# Patient Record
Sex: MALE | Race: WHITE | NOT HISPANIC OR LATINO | Employment: OTHER | URBAN - METROPOLITAN AREA
[De-identification: names, ages, dates, MRNs, and addresses within clinical notes are randomized per-mention and may not be internally consistent; named-entity substitution may affect disease eponyms.]

---

## 2017-01-12 ENCOUNTER — ALLSCRIPTS OFFICE VISIT (OUTPATIENT)
Dept: OTHER | Facility: OTHER | Age: 72
End: 2017-01-12

## 2017-04-19 ENCOUNTER — APPOINTMENT (OUTPATIENT)
Dept: LAB | Facility: CLINIC | Age: 72
End: 2017-04-19
Payer: MEDICARE

## 2017-04-19 ENCOUNTER — TRANSCRIBE ORDERS (OUTPATIENT)
Dept: LAB | Facility: CLINIC | Age: 72
End: 2017-04-19

## 2017-04-19 DIAGNOSIS — Z12.5 SPECIAL SCREENING FOR MALIGNANT NEOPLASM OF PROSTATE: ICD-10-CM

## 2017-04-19 DIAGNOSIS — I10 ESSENTIAL HYPERTENSION, MALIGNANT: ICD-10-CM

## 2017-04-19 DIAGNOSIS — I25.119 ATHEROSCLEROSIS OF NATIVE CORONARY ARTERY WITH ANGINA PECTORIS, UNSPECIFIED WHETHER NATIVE OR TRANSPLANTED HEART (HCC): Primary | ICD-10-CM

## 2017-04-19 DIAGNOSIS — C61 MALIGNANT NEOPLASM OF PROSTATE (HCC): ICD-10-CM

## 2017-04-19 LAB
ALBUMIN SERPL BCP-MCNC: 3.6 G/DL (ref 3.5–5)
ALP SERPL-CCNC: 43 U/L (ref 46–116)
ALT SERPL W P-5'-P-CCNC: 22 U/L (ref 12–78)
ANION GAP SERPL CALCULATED.3IONS-SCNC: 6 MMOL/L (ref 4–13)
AST SERPL W P-5'-P-CCNC: 17 U/L (ref 5–45)
BASOPHILS # BLD AUTO: 0.03 THOUSANDS/ΜL (ref 0–0.1)
BASOPHILS NFR BLD AUTO: 1 % (ref 0–1)
BILIRUB SERPL-MCNC: 0.56 MG/DL (ref 0.2–1)
BUN SERPL-MCNC: 15 MG/DL (ref 5–25)
CALCIUM SERPL-MCNC: 8.7 MG/DL (ref 8.3–10.1)
CHLORIDE SERPL-SCNC: 102 MMOL/L (ref 100–108)
CHOLEST SERPL-MCNC: 141 MG/DL (ref 50–200)
CO2 SERPL-SCNC: 30 MMOL/L (ref 21–32)
CREAT SERPL-MCNC: 0.77 MG/DL (ref 0.6–1.3)
EOSINOPHIL # BLD AUTO: 0.13 THOUSAND/ΜL (ref 0–0.61)
EOSINOPHIL NFR BLD AUTO: 3 % (ref 0–6)
ERYTHROCYTE [DISTWIDTH] IN BLOOD BY AUTOMATED COUNT: 13.3 % (ref 11.6–15.1)
GFR SERPL CREATININE-BSD FRML MDRD: >60 ML/MIN/1.73SQ M
GLUCOSE P FAST SERPL-MCNC: 89 MG/DL (ref 65–99)
HCT VFR BLD AUTO: 42.8 % (ref 36.5–49.3)
HDLC SERPL-MCNC: 58 MG/DL (ref 40–60)
HGB BLD-MCNC: 14.4 G/DL (ref 12–17)
LDLC SERPL CALC-MCNC: 69 MG/DL (ref 0–100)
LYMPHOCYTES # BLD AUTO: 1.8 THOUSANDS/ΜL (ref 0.6–4.47)
LYMPHOCYTES NFR BLD AUTO: 38 % (ref 14–44)
MCH RBC QN AUTO: 30.1 PG (ref 26.8–34.3)
MCHC RBC AUTO-ENTMCNC: 33.6 G/DL (ref 31.4–37.4)
MCV RBC AUTO: 89 FL (ref 82–98)
MONOCYTES # BLD AUTO: 0.42 THOUSAND/ΜL (ref 0.17–1.22)
MONOCYTES NFR BLD AUTO: 9 % (ref 4–12)
NEUTROPHILS # BLD AUTO: 2.35 THOUSANDS/ΜL (ref 1.85–7.62)
NEUTS SEG NFR BLD AUTO: 49 % (ref 43–75)
NRBC BLD AUTO-RTO: 0 /100 WBCS
PLATELET # BLD AUTO: 149 THOUSANDS/UL (ref 149–390)
PMV BLD AUTO: 11.7 FL (ref 8.9–12.7)
POTASSIUM SERPL-SCNC: 4.4 MMOL/L (ref 3.5–5.3)
PROT SERPL-MCNC: 7.2 G/DL (ref 6.4–8.2)
RBC # BLD AUTO: 4.79 MILLION/UL (ref 3.88–5.62)
SODIUM SERPL-SCNC: 138 MMOL/L (ref 136–145)
TRIGL SERPL-MCNC: 72 MG/DL
TSH SERPL DL<=0.05 MIU/L-ACNC: 1.81 UIU/ML (ref 0.36–3.74)
WBC # BLD AUTO: 4.75 THOUSAND/UL (ref 4.31–10.16)

## 2017-04-19 PROCEDURE — 80053 COMPREHEN METABOLIC PANEL: CPT

## 2017-04-19 PROCEDURE — 84154 ASSAY OF PSA FREE: CPT

## 2017-04-19 PROCEDURE — 84153 ASSAY OF PSA TOTAL: CPT

## 2017-04-19 PROCEDURE — 84443 ASSAY THYROID STIM HORMONE: CPT

## 2017-04-19 PROCEDURE — 85025 COMPLETE CBC W/AUTO DIFF WBC: CPT

## 2017-04-19 PROCEDURE — 36415 COLL VENOUS BLD VENIPUNCTURE: CPT

## 2017-04-19 PROCEDURE — 80061 LIPID PANEL: CPT

## 2017-04-20 ENCOUNTER — GENERIC CONVERSION - ENCOUNTER (OUTPATIENT)
Dept: OTHER | Facility: OTHER | Age: 72
End: 2017-04-20

## 2017-04-20 LAB
PSA FREE MFR SERPL: 22.8 %
PSA FREE SERPL-MCNC: 1.46 NG/ML
PSA SERPL-MCNC: 6.4 NG/ML (ref 0–4)

## 2017-04-21 ENCOUNTER — GENERIC CONVERSION - ENCOUNTER (OUTPATIENT)
Dept: OTHER | Facility: OTHER | Age: 72
End: 2017-04-21

## 2017-08-03 ENCOUNTER — ALLSCRIPTS OFFICE VISIT (OUTPATIENT)
Dept: OTHER | Facility: OTHER | Age: 72
End: 2017-08-03

## 2017-08-03 ENCOUNTER — TRANSCRIBE ORDERS (OUTPATIENT)
Dept: ADMINISTRATIVE | Facility: HOSPITAL | Age: 72
End: 2017-08-03

## 2017-08-03 ENCOUNTER — APPOINTMENT (OUTPATIENT)
Dept: LAB | Facility: HOSPITAL | Age: 72
End: 2017-08-03
Payer: MEDICARE

## 2017-08-03 DIAGNOSIS — R31.9 HEMATURIA: ICD-10-CM

## 2017-08-03 DIAGNOSIS — R31.9 HEMATURIA SYNDROME: Primary | ICD-10-CM

## 2017-08-03 DIAGNOSIS — K21.9 GASTRO-ESOPHAGEAL REFLUX DISEASE WITHOUT ESOPHAGITIS: ICD-10-CM

## 2017-08-03 DIAGNOSIS — R31.9 HEMATURIA: Primary | ICD-10-CM

## 2017-08-03 LAB
CLARITY UR: NORMAL
COLOR UR: YELLOW
GLUCOSE (HISTORICAL): NORMAL
HGB UR QL STRIP.AUTO: NORMAL
KETONES UR STRIP-MCNC: NORMAL MG/DL
LEUKOCYTE ESTERASE UR QL STRIP: NORMAL
NITRITE UR QL STRIP: NORMAL
PH UR STRIP.AUTO: 5 [PH]
PROT UR STRIP-MCNC: NORMAL MG/DL
SP GR UR STRIP.AUTO: 1.03

## 2017-08-03 PROCEDURE — 88112 CYTOPATH CELL ENHANCE TECH: CPT

## 2017-08-09 ENCOUNTER — ALLSCRIPTS OFFICE VISIT (OUTPATIENT)
Dept: OTHER | Facility: OTHER | Age: 72
End: 2017-08-09

## 2017-09-06 ENCOUNTER — GENERIC CONVERSION - ENCOUNTER (OUTPATIENT)
Dept: OTHER | Facility: OTHER | Age: 72
End: 2017-09-06

## 2017-09-06 ENCOUNTER — HOSPITAL ENCOUNTER (OUTPATIENT)
Dept: RADIOLOGY | Facility: HOSPITAL | Age: 72
Discharge: HOME | End: 2017-09-06
Payer: MEDICARE

## 2017-09-06 ENCOUNTER — TRANSCRIBE ORDERS (OUTPATIENT)
Dept: ADMINISTRATIVE | Facility: HOSPITAL | Age: 72
End: 2017-09-06

## 2017-09-06 DIAGNOSIS — K21.9 GASTRO-ESOPHAGEAL REFLUX DISEASE WITHOUT ESOPHAGITIS: ICD-10-CM

## 2017-09-06 PROCEDURE — 74240 X-RAY XM UPR GI TRC 1CNTRST: CPT

## 2017-09-20 ENCOUNTER — APPOINTMENT (OUTPATIENT)
Dept: LAB | Facility: CLINIC | Age: 72
End: 2017-09-20
Payer: MEDICARE

## 2017-09-20 DIAGNOSIS — R31.9 HEMATURIA: ICD-10-CM

## 2017-09-20 LAB
ANION GAP SERPL CALCULATED.3IONS-SCNC: 4 MMOL/L (ref 4–13)
BUN SERPL-MCNC: 17 MG/DL (ref 5–25)
CALCIUM SERPL-MCNC: 9.1 MG/DL (ref 8.3–10.1)
CHLORIDE SERPL-SCNC: 104 MMOL/L (ref 100–108)
CO2 SERPL-SCNC: 30 MMOL/L (ref 21–32)
CREAT SERPL-MCNC: 0.79 MG/DL (ref 0.6–1.3)
GFR SERPL CREATININE-BSD FRML MDRD: 91 ML/MIN/1.73SQ M
GLUCOSE SERPL-MCNC: 67 MG/DL (ref 65–140)
POTASSIUM SERPL-SCNC: 3.6 MMOL/L (ref 3.5–5.3)
SODIUM SERPL-SCNC: 138 MMOL/L (ref 136–145)

## 2017-09-20 PROCEDURE — 80048 BASIC METABOLIC PNL TOTAL CA: CPT

## 2017-09-20 PROCEDURE — 36415 COLL VENOUS BLD VENIPUNCTURE: CPT

## 2017-09-25 ENCOUNTER — HOSPITAL ENCOUNTER (OUTPATIENT)
Dept: CT IMAGING | Facility: HOSPITAL | Age: 72
Discharge: HOME | End: 2017-09-25
Payer: MEDICARE

## 2017-09-25 DIAGNOSIS — R31.9 HEMATURIA: ICD-10-CM

## 2017-09-25 PROCEDURE — 74178 CT ABD&PLV WO CNTR FLWD CNTR: CPT

## 2017-09-25 RX ADMIN — IOHEXOL 100 ML: 350 INJECTION, SOLUTION INTRAVENOUS at 11:25

## 2017-09-29 ENCOUNTER — ALLSCRIPTS OFFICE VISIT (OUTPATIENT)
Dept: OTHER | Facility: OTHER | Age: 72
End: 2017-09-29

## 2017-09-29 LAB
BILIRUB UR QL STRIP: NORMAL
CLARITY UR: NORMAL
COLOR UR: YELLOW
GLUCOSE (HISTORICAL): NORMAL
HGB UR QL STRIP.AUTO: NORMAL
KETONES UR STRIP-MCNC: NORMAL MG/DL
LEUKOCYTE ESTERASE UR QL STRIP: NORMAL
NITRITE UR QL STRIP: NORMAL
PH UR STRIP.AUTO: 5 [PH]
PROT UR STRIP-MCNC: NORMAL MG/DL
SP GR UR STRIP.AUTO: 1.03
UROBILINOGEN UR QL STRIP.AUTO: NORMAL

## 2017-11-02 ENCOUNTER — ALLSCRIPTS OFFICE VISIT (OUTPATIENT)
Dept: OTHER | Facility: OTHER | Age: 72
End: 2017-11-02

## 2017-11-03 NOTE — PROGRESS NOTES
Assessment  1  Benign prostatic hyperplasia with elevated prostate specific antigen (PSA)   (600 00,790 93) (N40 0,R97 20)   2  Elevated prostate specific antigen (PSA) (790 93) (R97 20)    Discussion/Summary  Discussion Summary:   My impression is history of elevated PSA, negative transrectal ultrasound-guided biopsy of the prostate, BPH, small bladder stones, resolved hematuria  had a lengthy discussion in the office today regarding medical management of his BPH  He continues on terazosin  At the very least I recommend the addition of finasteride  We also discussed cystoscopy with removal of the multiple small bladder stones in the operating room along with transurethral resection of the prostate at that time  At this time the patient is not interested in a surgical intervention  He also did 1st finasteride  He wishes to continue with the terazosin and trial saw palmetto  We discussed that there is not strong more convincing data that Sol palmetto will help alleviate his lower urinary tract symptoms  In addition, we discussed that eventually his bladder stones should be removed as these can grow, become symptomatic, and cause infection  The patient will return in 6 months time for reassessment  Goals and Barriers: The patient has the current Goals: Minimize lower urinary tract symptoms  The patent has the current Barriers: Hesitancy for additional medication and surgery otherwise no barriers  Patient's Capacity to Self-Care: Patient is able to Self-Care  Counseling Documentation With Imm: total time of encounter was 25 minutes-- and-- 20 minutes was spent counseling  Chief Complaint  Chief Complaint Free Text Note Form: uroflow/PVR, BPH, hematuria, elevated PSA      History of Present Illness  HPI: Neisha Crain is a 53-year-old male with a history of an elevated PSA over 5  He previously had a transrectal ultrasound-guided biopsy of the prostate performed in January 2014   In the right central zone there was an area of atypical cells suspicious but nondiagnostic for prostate cancer  I therefore recommend repeating a prostate biopsy  This was performed in March of 2014  This biopsy was negative for prostate cancer  most recent PSA level from April 2017 is 6 4  The patient also recently underwent flexible cystoscopy for hematuria and his lower urinary tract symptoms  Urine cytology was negative for malignant cells  Cystoscopy revealed lateral lobe hyperplasia and multiple 3-4 mm bladder stones  he only has mild lower urinary tract symptoms at this time  He denies any further hematuria  He denies any urinary tract infections  Overall he has minimal bother from his lower urinary tract symptoms at this time  residual assessment in the office today is notable for 80 cc  Review of Systems  Complete-Male Urology:   Genitourinary: stream quality varies,-- dysuria,-- urinary hesitancy-- and-- feelings of urinary urgency, but-- no hematuria,-- no empty sensation-- and-- no incontinence--    The patient presents with complaints of nocturia (2x)  Active Problems  1  Anemia (285 9) (D64 9)   2  Anxiety (300 00) (F41 9)   3  Benign prostatic hyperplasia with elevated prostate specific antigen (PSA)   (600 00,790 93) (N40 0,R97 20)   4  CAD in native artery (414 01) (I25 10)   5  Depression screen (V79 0) (Z13 89)   6  Elevated prostate specific antigen (PSA) (790 93) (R97 20)   7  Encounter for screening colonoscopy (V76 51) (Z12 11)   8  Encounter for screening for other nervous system disorders (V80 09) (Z13 858)   9  Encounter for special screening examination for genitourinary disorder (V81 6) (Z13 89)   10  Esophageal reflux (530 81) (K21 9)   11  Fatigue (780 79) (R53 83)   12  Hearing Loss (389 9)   13  Hematuria (599 70) (R31 9)   14  Hyperlipidemia (272 4) (E78 5)   15  Hypertension (401 9) (I10)   16  Medicare annual wellness visit, subsequent (V70 0) (Z00 00)   17  Nocturia (788 43) (R35 1)   18   Osteopenia (733 90) (M85 80)   19  PPD screening test (V74 1) (Z11 1)   20  Special screening examination for neoplasm of prostate (V76 44) (Z12 5)   21  Urinary frequency (788 41) (R35 0)   22  Vitamin D deficiency (268 9) (E55 9)    Past Medical History  1  History of Diverticulosis (562 10) (K57 90)   2  History of Medicare annual wellness visit, subsequent (V70 0) (Z00 00)    Surgical History  1  History of Aortic Valve Assessment   2  History of Arthroscopy Knee   3  History of Diagnostic Cystoscopy   4  History of Needle Biopsy Of Prostate    Family History  Father    1  Family history of cardiac disorder (V17 49) (Z80 55)    Social History   · Being A Social Drinker   · Denied: Drug Use (305 90)   · Marital History - Currently    · Never A Smoker   · Nonsmoker (V49 89) (Z78 9)    Current Meds   1  ALPRAZolam 0 25 MG Oral Tablet; TAKE 1 TABLET 3 times a day; Therapy: 67TQP9637 to (Last Rx:56Rcp6650) Ordered   2  Aspirin 81 MG TABS; Therapy: (Robert Salvage) to Recorded   3  Simvastatin 20 MG Oral Tablet; Take 1 tablet daily; Therapy: 17SUJ2583 to (Last Rx:92Mzx4212)  Requested for: 15YTK3109 Ordered   4  Terazosin HCl - 5 MG Oral Capsule; take 1 capsule daily; Therapy: 53NPC9721 to (Evaluate:13Ono8479)  Requested for: 02LUY3206; Last   UN:09NYA8809 Ordered    Allergies  1  No Known Drug Allergies    Vitals  Vital Signs    Recorded: 89YPC6038 91:33XD   Systolic 384   Diastolic 80   Height 6 ft 2 in   Weight 182 lb    BMI Calculated 23 37   BSA Calculated 2 09     Procedure    Procedure: Bladder Ultrasound Post Void Residual    Test indication: Benign Prostatic Hyperplasia  Equipment And Procedure: The patient voided 60 8 ml    U/S Findings: PVR=84ml        Signatures   Electronically signed by : Leo Goldsmith MD; Nov 2 2017 11:19AM EST                       (Author)

## 2018-01-09 NOTE — PROGRESS NOTES
Assessment    1  PPD screening test (V74 1) (Z11 1)   2  Esophageal reflux (530 81) (K21 9)   3  Benign prostatic hyperplasia with elevated prostate specific antigen (PSA)   (600 00,790 93) (N40 0,R97 20)   4  Elevated prostate specific antigen (PSA) (790 93) (R97 20)   5  CAD in native artery (414 01) (I25 10)   6  Hematuria (599 70) (R31 9)    Plan   [place] UPPER GI UGI; Status:Hold For - Scheduling; Requested for:50Ehh2650;   Perform:[facility]iology; XQE:86UGY6742; Ordered;    For:Esophageal reflux; Ordered By:Jamie Ferro;      Discussion/Summary  health maintenance visit eats an adequate diet Currently, he has an adequate exercise regimen  Prostate cancer screening: prostate cancer screening is current  Testicular cancer screening: testicular cancer screening is not indicated  Colorectal cancer screening: colorectal cancer screening is current  The immunizations are up to date  He was advised to be evaluated by Urology  Advice and education were given regarding aerobic exercise  I have asked him to get an upper GI to evaluate the heartburn  He will see orthopedics regarding his knee replacement  He should follow up with his cardiologist  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  He will return after having his PPD done today  Chief Complaint  PATIENT IS HERE FOR [facility] PHYSICAL  Advance Directives  Advance Directive [facility]:   The patient is not in agreement to receive the Advance Care Planning service    NO - Patient does not have an advance health care directive  Summary of Advance Directive Conversation  He has the paperwork  History of Present Illness  HM, Adult Male: The patient is being seen for a health maintenance evaluation  General Health: The patient's health since the last visit is described as good  He has regular dental visits  He denies vision problems  He denies hearing loss  Immunizations status: up to date  Lifestyle:   He consumes a diverse and healthy diet  He does not have any weight concerns  He exercises regularly  He does not use tobacco  He denies alcohol use  He denies drug use  Screening: cancer screening reviewed and current  metabolic screening reviewed and current  risk screening reviewed and current  Additional History:  He has been having some heartburn  He does not believe there is anything in his diet that is causing it  He is considering having knee replacement surgery  HPI: Here for [facility] physical  He has had some recent hematuria and has been seen by the urologist      Review of Systems    Constitutional: No fever or chills, feels well, no tiredness, no recent weight gain or weight loss  Eyes: No complaints of eye pain, no red eyes, no discharge from eyes, no itchy eyes  ENT: no complaints of earache, no hearing loss, no nosebleeds, no nasal discharge, no sore throat, no hoarseness  Cardiovascular: No complaints of slow heart rate, no fast heart rate, no chest pain, no palpitations, no leg claudication, no lower extremity  Respiratory: No complaints of shortness of breath, no wheezing, no cough, no SOB on exertion, no orthopnea or PND  Gastrointestinal: No complaints of abdominal pain, no constipation, no nausea or vomiting, no diarrhea or bloody stools  Genitourinary: No complaints of dysuria, no incontinence, no hesitancy, no nocturia, no genital lesion, no testicular pain  Musculoskeletal: No complaints of arthralgia, no myalgias, no joint swelling or stiffness, no limb pain or swelling  Integumentary: No complaints of skin rash or skin lesions, no itching, no skin wound, no dry skin  Neurological: No compliants of headache, no confusion, no convulsions, no numbness or tingling, no dizziness or fainting, no limb weakness, no difficulty walking  Psychiatric: Is not suicidal, no sleep disturbances, no anxiety or depression, no change in personality, no emotional problems     Endocrine: No complaints of proptosis, no hot flashes, no muscle weakness, no erectile dysfunction, no deepening of the voice, no feelings of weakness  Hematologic/Lymphatic: No complaints of swollen glands, no swollen glands in the neck, does not bleed easily, no easy bruising  Active Problems    1  Anemia (285 9) (D64 9)   2  Anxiety (300 00) (F41 9)   3  Benign prostatic hyperplasia with elevated prostate specific antigen (PSA)   (600 00,790 93) (N40 0,R97 20)   4  CAD in native artery (414 01) (I25 10)   5  Depression screen (V79 0) (Z13 89)   6  Elevated prostate specific antigen (PSA) (790 93) (R97 20)   7  Encounter for screening colonoscopy (V76 51) (Z12 11)   8  Encounter for screening for other nervous system disorders (V80 09) (Z13 858)   9  Encounter for special screening examination for genitourinary disorder (V81 6) (Z13 89)   10  Esophageal reflux (530 81) (K21 9)   11  Fatigue (780 79) (R53 83)   12  Hearing Loss (389 9)   13  Hematuria (599 70) (R31 9)   14  Hyperlipidemia (272 4) (E78 5)   15  Hypertension (401 9) (I10)   16  Medicare annual wellness visit, subsequent (V70 0) (Z00 00)   17  Nocturia (788 43) (R35 1)   18  Osteopenia (733 90) (M85 80)   19  PPD screening test (V74 1) (Z11 1)   20  Special screening examination for neoplasm of prostate (V76 44) (Z12 5)   21  Urinary frequency (788 41) (R35 0)   22   Vitamin D deficiency (268 9) (E55 9)    Past Medical History    · History of Diverticulosis (562 10) (K57 90)   · History of Medicare annual wellness visit, subsequent (V70 0) (Z00 00)    Surgical History    · History of Aortic Valve Assessment   · History of Arthroscopy Knee   · History of Diagnostic Cystoscopy   · History of Needle Biopsy Of Prostate    Family History  Father    · Family history of cardiac disorder (V17 49) (Z80 55)    Social History    · Being A Social Drinker   · Denied: Drug Use (305 90)   · Marital History - Currently    · Never A Smoker   · Nonsmoker (V49 89) (Z78 9)    Current Meds   1  ALPRAZolam 0 25 MG Oral Tablet; TAKE 1 TABLET 3 times a day; Therapy: 21KVW8950 to (Last Rx:10Jun2017) Ordered   2  Aspirin 81 MG TABS; Therapy: (Malka Pratt) to Recorded   3  Simvastatin 20 MG Oral Tablet; Take 1 tablet daily; Therapy: 26JND8450 to (Last Rx:14Cad0519)  Requested for: 07WAW7652 Ordered   4  Terazosin HCl - 5 MG Oral Capsule; take 1 capsule daily; Therapy: 78VLZ1268 to (Evaluate:29Apr2018)  Requested for: 40MKW2839; Last   XC:39APQ3447 Ordered    Allergies    1  No Known Drug Allergies    Vitals   Recorded: 09Aug2017 11:23AM   Temperature 98 2 F   Heart Rate 61   Systolic 323   Diastolic 80   Height 6 ft 2 in   Weight 179 lb 12 oz   BMI Calculated 23 08   BSA Calculated 2 08   O2 Saturation 98     Physical Exam    Constitutional   General appearance: No acute distress, well appearing and well nourished  Eyes   Conjunctiva and lids: No swelling, erythema, or discharge  Pupils and irises: Equal, round and reactive to light  Ears, Nose, Mouth, and Throat   External inspection of ears and nose: Normal     Otoscopic examination: Tympanic membrance translucent with normal light reflex  Canals patent without erythema  Oropharynx: Normal with no erythema, edema, exudate or lesions  Pulmonary   Respiratory effort: No increased work of breathing or signs of respiratory distress  Auscultation of lungs: Clear to auscultation  Cardiovascular   Auscultation of heart: Normal rate and rhythm, normal S1 and S2, without murmurs  Examination of extremities for edema and/or varicosities: Normal     Abdomen   Abdomen: Abnormal   Epigastric tenderness  Liver and spleen: No hepatomegaly or splenomegaly  Lymphatic   Palpation of lymph nodes in neck: No lymphadenopathy  Musculoskeletal   Gait and station: Normal     Digits and nails: Normal without clubbing or cyanosis      Inspection/palpation of joints, bones, and muscles: Normal     Skin   Skin and subcutaneous tissue: Normal without rashes or lesions  Neurologic   Cranial nerves: Cranial nerves 2-12 intact  Reflexes: 2+ and symmetric  Sensation: No sensory loss  Psychiatric   Orientation to person, place and time: Normal     Mood and affect: Normal        Results/Data  Falls Risk Assessment (Dx Z13 89 Screen for Neurologic Disorder) 61RET6890 11:28AM User, Ahs     Test Name Result Flag Reference   Falls Risk      No falls in the past year     PHQ-2 Adult Depression Screening 84Kzb2711 11:28AM User, Ahs     Test Name Result Flag Reference   PHQ-2 Adult Depression Score 0     Over the last two weeks, how often have you been bothered by any of the following problems? Little interest or pleasure in doing things: Not at all - 0  Feeling down, depressed, or hopeless: Not at all - 0   PHQ-2 Adult Depression Screening Negative         Procedure    Procedure:   Inforrmation supplied by a Snellen chart  Results: 20/20 in both eyes with corrective device, 20/20 in the right eye with corrective device, 20/20 in the left eye with corrective device      Health Management  Encounter for screening colonoscopy   COLONOSCOPY; every 10 years; Last 14GLQ0040; Next Due: 40ZRF9834; Active  Encounter for screening for other nervous system disorders   *VB - Fall Risk Assessment  (Dx Z13 89 Screen for Neurologic Disorder); every 1 year; Last 64ZPF3071; Next Due: 70LAV0641; Near Due  Encounter for special screening examination for genitourinary disorder   *VB - Urinary Incontinence Screen (Dx Z13 89 Screen for UI); every 1 year; Last  79ATN0566; Next Due: 09WRT9277; Near Due  Medicare annual wellness visit, subsequent   Medicare Annual Wellness Visit; every 1 year; Last 99LOD2636; Next Due: 06GSW8148;  Near Due    Future Appointments    Date/Time Provider Specialty Site   09/29/2017 01:30 PM RAMONITA Limon   Urology [facility]of[facility] Lim   12/28/2017 10:45 AM Isadora Hoyt MD Urology [facility] [place]     Signatures   Electronically signed by : Tobin Martinez DO; Aug  9 2017 12:03PM EST                       (Author)

## 2018-01-11 NOTE — RESULT NOTES
Discussion/Summary   labs are normal     Verified Results  (1) CBC/PLT/DIFF 29UHO4190 07:42AM Marshall Pearl     Test Name Result Flag Reference   WBC COUNT 4 75 Thousand/uL  4 31-10 16   RBC COUNT 4 79 Million/uL  3 88-5 62   HEMOGLOBIN 14 4 g/dL  12 0-17 0   HEMATOCRIT 42 8 %  36 5-49 3   MCV 89 fL  82-98   MCH 30 1 pg  26 8-34 3   MCHC 33 6 g/dL  31 4-37 4   RDW 13 3 %  11 6-15 1   MPV 11 7 fL  8 9-12 7   PLATELET COUNT 653 Thousands/uL  149-390   nRBC AUTOMATED 0 /100 WBCs     NEUTROPHILS RELATIVE PERCENT 49 %  43-75   LYMPHOCYTES RELATIVE PERCENT 38 %  14-44   MONOCYTES RELATIVE PERCENT 9 %  4-12   EOSINOPHILS RELATIVE PERCENT 3 %  0-6   BASOPHILS RELATIVE PERCENT 1 %  0-1   NEUTROPHILS ABSOLUTE COUNT 2 35 Thousands/? ??L  1 85-7 62   LYMPHOCYTES ABSOLUTE COUNT 1 80 Thousands/? ??L  0 60-4 47   MONOCYTES ABSOLUTE COUNT 0 42 Thousand/? ??L  0 17-1 22   EOSINOPHILS ABSOLUTE COUNT 0 13 Thousand/? ??L  0 00-0 61   BASOPHILS ABSOLUTE COUNT 0 03 Thousands/? ??L  0 00-0 10   This bloodwork is non-fasting  Please drink two glasses of water morning of  bloodwork       (1) COMPREHENSIVE METABOLIC PANEL 71XUQ0064 87:67XO Marshall Pearl     Test Name Result Flag Reference   SODIUM 138 mmol/L  136-145   POTASSIUM 4 4 mmol/L  3 5-5 3   CHLORIDE 102 mmol/L  100-108   CARBON DIOXIDE 30 mmol/L  21-32   ANION GAP (CALC) 6 mmol/L  4-13   BLOOD UREA NITROGEN 15 mg/dL  5-25   CREATININE 0 77 mg/dL  0 60-1 30   Standardized to IDMS reference method   CALCIUM 8 7 mg/dL  8 3-10 1   BILI, TOTAL 0 56 mg/dL  0 20-1 00   ALK PHOSPHATAS 43 U/L L    ALT (SGPT) 22 U/L  12-78   AST(SGOT) 17 U/L  5-45   ALBUMIN 3 6 g/dL  3 5-5 0   TOTAL PROTEIN 7 2 g/dL  6 4-8 2   eGFR Non-African American      >60 0 ml/min/1 73sq [facility] Disease Education Program recommendations are as follows:  GFR calculation is accurate only with a steady state creatinine  Chronic Kidney disease less than 60 ml/min/1 73 sq  meters  Kidney failure less than 15 ml/min/1 73 sq  meters  GLUCOSE FASTING 89 mg/dL  65-99     (1) TSH 19Apr2017 07:42AM Wally Mask     Test Name Result Flag Reference   TSH 1 810 uIU/mL  0 358-3 740   This bloodwork is non-fasting  Please drink two glasses of water morning of  bloodwork  Patients undergoing fluorescein dye angiography may retain small amounts of fluorescein in the body for 48-72 hours post procedure  Samples containing fluorescein can produce falsely depressed TSH values  If the patient had this procedure,a specimen should be resubmitted post fluorescein clearance  (1) LIPID PANEL, FASTING 19Apr2017 07:42AM Wally Mask     Test Name Result Flag Reference   CHOLESTEROL 141 mg/dL     HDL,DIRECT 58 mg/dL  40-60   Specimen collection should occur prior to Metamizole administration due to the potential for falsely depressed results  LDL CHOLESTEROL CALCULATED 69 mg/dL  0-100   This is a fasting blood test  Water,black tea or black  coffee only after 9:00pm the night before test  Drink 2 glasses of water the morning of test         Triglyceride:         Normal              <150 mg/dl       Borderline High    150-199 mg/dl       High               200-499 mg/dl       Very High          >499 mg/dl  Cholesterol:         Desirable        <200 mg/dl      Borderline High  200-239 mg/dl      High             >239 mg/dl  HDL Cholesterol:        High    >59 mg/dL      Low     <41 mg/dL  LDL CALCULATED:    This screening LDL is a calculated result  It does not have the accuracy of the Direct Measured LDL in the monitoring of patients with hyperlipidemia and/or statin therapy  Direct Measure LDL (FRT166) must be ordered separately in these patients  TRIGLYCERIDES 72 mg/dL  <=150   Specimen collection should occur prior to N-Acetylcysteine or Metamizole administration due to the potential for falsely depressed results

## 2018-01-12 VITALS
WEIGHT: 182 LBS | SYSTOLIC BLOOD PRESSURE: 130 MMHG | BODY MASS INDEX: 23.36 KG/M2 | DIASTOLIC BLOOD PRESSURE: 80 MMHG | HEIGHT: 74 IN

## 2018-01-12 DIAGNOSIS — R97.20 ELEVATED PROSTATE SPECIFIC ANTIGEN (PSA): ICD-10-CM

## 2018-01-13 VITALS
BODY MASS INDEX: 23.07 KG/M2 | HEIGHT: 74 IN | HEART RATE: 61 BPM | OXYGEN SATURATION: 98 % | DIASTOLIC BLOOD PRESSURE: 80 MMHG | SYSTOLIC BLOOD PRESSURE: 120 MMHG | TEMPERATURE: 98.2 F | WEIGHT: 179.75 LBS

## 2018-01-13 VITALS
BODY MASS INDEX: 22.97 KG/M2 | DIASTOLIC BLOOD PRESSURE: 84 MMHG | SYSTOLIC BLOOD PRESSURE: 130 MMHG | HEIGHT: 74 IN | WEIGHT: 179 LBS

## 2018-01-14 VITALS
DIASTOLIC BLOOD PRESSURE: 80 MMHG | HEIGHT: 74 IN | SYSTOLIC BLOOD PRESSURE: 122 MMHG | HEART RATE: 72 BPM | WEIGHT: 184.5 LBS | BODY MASS INDEX: 23.68 KG/M2

## 2018-01-15 VITALS — DIASTOLIC BLOOD PRESSURE: 78 MMHG | HEART RATE: 70 BPM | SYSTOLIC BLOOD PRESSURE: 128 MMHG

## 2018-01-15 NOTE — RESULT NOTES
Discussion/Summary   UGI shows lots of reflux  cont with meds     Verified Results  [facility] UPPER GI UGI [id number] 08:44AM Catina Ross Order Number: [id number]    - Patient Instructions: To schedule this appointment, please contact Central Scheduling at [phone]  Test Name Result Flag Reference   FL UPPER GI WO  (Report)     UPPER GI SERIES DOUBLE CONTRAST     INDICATION: K21 9: Gastro-esophageal reflux disease without esophagitis  History taken directly from the electronic ordering system  Heartburn  COMPARISON: None     IMAGES: 29     FLUOROSCOPY TIME:  2 2 minutes     TECHNIQUE: The patient was given effervescent granules and barium by mouth and images of the esophagus, stomach, and small bowel were obtained  FINDINGS:     The esophagus is normal in caliber  Mild intermittent tertiary contractions of the esophagus were identified  No ulceration appreciated  No stricture is seen     The stomach is unremarkable in size  The gastric mucosa is normal  No penetrating ulcers or masses  Contrast empties promptly into the duodenum  The duodenum is normal in caliber  The ligament of Treitz/duodenojejunal junction lies in a normal position  Spontaneous gastroesophageal reflux was noted, with barium reaching the thoracic inlet  IMPRESSION:       1  Large degree of spontaneous gastroesophageal reflux   2  Intermittent tertiary contractions of the esophagus   3   No peptic ulcers or strictures are seen       Workstation performed: [id number]     Signed by:   Varsha Segundo MD   9/6/17

## 2018-01-16 NOTE — RESULT NOTES
Verified Results  (1) CBC/PLT/DIFF [id number] 07:46AM Carlton Gaffney     Test Name Result Flag Reference   WBC COUNT 4 56 Thousand/uL  4 31-10 16   RBC COUNT 4 82 Million/uL  3 88-5 62   HEMOGLOBIN 14 6 g/dL  12 0-17 0   HEMATOCRIT 42 7 %  36 5-49 3   MCV 89 fL  82-98   MCH 30 3 pg  26 8-34 3   MCHC 34 2 g/dL  31 4-37 4   RDW 13 2 %  11 6-15 1   MPV 11 9 fL  8 9-12 7   PLATELET COUNT 187 Thousands/uL L 149-390   nRBC AUTOMATED 0 /100 WBCs     NEUTROPHILS RELATIVE PERCENT 49 %  43-75   LYMPHOCYTES RELATIVE PERCENT 40 %  14-44   MONOCYTES RELATIVE PERCENT 9 %  4-12   EOSINOPHILS RELATIVE PERCENT 2 %  0-6   BASOPHILS RELATIVE PERCENT 0 %  0-1   NEUTROPHILS ABSOLUTE COUNT 2 25 Thousands/µL  1 85-7 62   LYMPHOCYTES ABSOLUTE COUNT 1 80 Thousands/µL  0 60-4 47   MONOCYTES ABSOLUTE COUNT 0 39 Thousand/µL  0 17-1 22   EOSINOPHILS ABSOLUTE COUNT 0 08 Thousand/µL  0 00-0 61   BASOPHILS ABSOLUTE COUNT 0 02 Thousands/µL  0 00-0 10     (1) VITAMIN D 25-HYDROXY [id number] 07:46AM [facility]     Test Name Result Flag Reference   VIT D 25-HYDROX 25 5 ng/mL L 30 0-100 0     (1) COMPREHENSIVE METABOLIC PANEL [id number] 91:16DX [facility] Kidney Disease Education Program recommendations are as follows:  GFR calculation is accurate only with a steady state creatinine  Chronic Kidney disease less than 60 ml/min/1 73 sq  meters  Kidney failure less than 15 ml/min/1 73 sq  meters  Test Name Result Flag Reference   GLUCOSE,RANDM 86 mg/dL     If the patient is fasting, the ADA then defines impaired fasting glucose as > 100 mg/dL and diabetes as > or equal to 123 mg/dL     SODIUM 140 mmol/L  136-145   POTASSIUM 4 4 mmol/L  3 5-5 3   CHLORIDE 106 mmol/L  100-108   CARBON DIOXIDE 30 mmol/L  21-32   ANION GAP (CALC) 4 mmol/L  4-13   BLOOD UREA NITROGEN 15 mg/dL  5-25   CREATININE 0 76 mg/dL  0 60-1 30   Standardized to IDMS reference method   CALCIUM 8 4 mg/dL  8 3-10 1   BILI, TOTAL 0 56 mg/dL  0 20-1 00   ALK PHOSPHATAS 44 U/L L    ALT (SGPT) 28 U/L  12-78   AST(SGOT) 18 U/L  5-45   ALBUMIN 3 8 g/dL  3 5-5 0   TOTAL PROTEIN 6 9 g/dL  6 4-8 2   eGFR Non-African American      >60 0 ml/min/1 73sq m     (1) LIPID PANEL, FASTING [id number] 07:46AM Oliva Cornea   Triglyceride:         Normal              <150 mg/dl       Borderline High    150-199 mg/dl       High               200-499 mg/dl       Very High          >499 mg/dl  Cholesterol:         Desirable        <200 mg/dl      Borderline High  200-239 mg/dl      High             >239 mg/dl  HDL Cholesterol:        High    >59 mg/dL      Low     <41 mg/dL     Test Name Result Flag Reference   CHOLESTEROL 125 mg/dL     HDL,DIRECT 53 mg/dL  40-60   LDL CHOLESTEROL CALCULATED 55 mg/dL  0-100   TRIGLYCERIDES 84 mg/dL  <=150     (1) TSH [id number] 07:46AM Oliva Cornea   Patients undergoing fluorescein dye angiography may retain small amounts of fluorescein in the body for 48-72 hours post procedure  Samples containing fluorescein can produce falsely depressed TSH values  If the patient had this procedure,a specimen should be resubmitted post fluorescein clearance  Test Name Result Flag Reference   TSH 1 350 uIU/mL  0 358-3 740       Discussion/Summary   Labs are stable but vitamin D is low   Add vitamin D3 2000 IUs daily

## 2018-01-17 NOTE — RESULT NOTES
Discussion/Summary   PSA IS ABOUT THE SAME  FOLLOW UP WITH UROLOGY     Verified Results  (1) PSA FREE & TOTAL 19Apr2017 07:42AM Dorian Portillo     Test Name Result Flag Reference   PSA, FREE 1 46 ng/mL  N/A   Roche ECLIA methodology  % FREE PSA 22 8 %     The table below lists the probability of prostate cancer for  men with non-suspicious HEMA results and total PSA between  4 and 10 ng/mL, by patient age Kitty Connors, [facility],  [id number])  % Free PSA       50-64 yr        65-75 yr                    0 00-10 00%        56%             55%                   10 01-15 00%        24%             35%                   15 01-20 00%        17%             23%                   20 01-25 00%        10%             20%                        >25 00%         5%              9%  Please note:  Gurmeet et al did not make specific                recommendations regarding the use of                percent free PSA for any other population                of men  Performed at:  [address] [facility] [facility] [address]  [id number]  : Jennifer Barriga MD, Phone:  [phone]   PROSTATE SPECIFIC ANTIGEN TOTAL 6 4 ng/mL H 0 0 - 4 0   Roche ECLIA methodology  According to the American Urological Association, Serum PSA should  decrease and remain at undetectable levels after radical  prostatectomy  The AUA defines biochemical recurrence as an initial  PSA value 0 2 ng/mL or greater followed by a subsequent confirmatory  PSA value 0 2 ng/mL or greater  Values obtained with different assay methods or kits cannot be used  interchangeably  Results cannot be interpreted as absolute evidence  of the presence or absence of malignant disease

## 2018-01-23 NOTE — PROGRESS NOTES
Assessment    1  Nonsmoker (V49 89) (Z78 9)   2  Benign prostatic hyperplasia with elevated prostate specific antigen (PSA)   (600 00,790 93) (N40 0,R97 2)   3  CAD in native artery (414 01) (I25 10)   4  Medicare annual wellness visit, subsequent (V70 0) (Z00 00)    Plan  Hyperlipidemia    · Simvastatin 20 MG Oral Tablet; Take 1 tablet daily  Medicare annual wellness visit, subsequent    · Good balance will help you avoid falls  There are many things you can do to maintain or  improve your sense of balance ; Status:Complete;   Done: [id number] 09:14AM   · There are many exercise options for seniors ; Status:Complete;   Done: 01RHP6580  09:14AM    Discussion/Summary    He will be referred to orthopedics for evaluation of his knee  He is reminded to do balance exercises and to increase his aerobic capacity  We refilled his simvastatin  He will return for fasting blood work later in the year  He's given paperwork to complete an advanced directive  Impression: Subsequent Annual Wellness Visit  Cardiovascular screening and counseling: screening is current  Diabetes screening and counseling: screening is current  Colorectal cancer screening and counseling: screening is current  Prostate cancer screening and counseling: screening is current  Osteoporosis screening and counseling: the risks and benefits of screening were discussed  Abdominal aortic aneurysm screening and counseling: screening is current  Glaucoma screening and counseling: screening is current  HIV screening and counseling: screening not indicated  Immunizations: the patient declines the influenza vaccination, the patient declines the pneumococcal vaccination, hepatitis B vaccination series is not indicated at this time due to the patient's low risk of aruna the disease, the patient declines the Zostavax vaccine and Tdap vaccination up to date     Advance Directive Planning: not complete, paperwork and instructions were given to the patient  Chief Complaint  PATIENT HERE FOR AWV PE       History of Present Illness  here for AWV  Having some issues with his right knee  The patient is being seen for the subsequent annual wellness visit  Medicare Screening and Risk Factors   Hospitalizations: no previous hospitalizations  Once per lifetime medicare screening tests: AAA screening   Medicare Screening Tests Risk Questions   Abdominal aortic aneurysm risk assessment: HAS HAD SEVERAL  Osteoporosis risk assessment:  and over 48years of age  Drug and Alcohol Use: The patient has never smoked cigarettes  The patient reports drinking drinks per day and drinking 1 drinks per month  He has never used illicit drugs  Diet and Physical Activity: Current diet includes well balanced meals, 1 servings of dairy products per day, 1 cups of coffee per day and 2 GLASSES OF WATER  He exercises 5 times per week  Exercise: walking, swimming, strength training, BIKING 5 hours per week  Mood Disorder and Cognitive Impairment Screening: He denies feeling down, depressed, or hopeless over the past two weeks  He denies feeling little interest or pleasure in doing things over the past two weeks  Cognitive impairment screening: denies difficulty learning/retaining new information, denies difficulty handling complex tasks, denies difficulty with reasoning, denies difficulty with spatial ability and orientation, denies difficulty with language and denies difficulty with behavior  Functional Ability/Level of Safety: Hearing is slightly decreased, slightly decreased in the right ear, slightly decreased in the left ear and a hearing aid is used  The patient is currently able to do activities of daily living without limitations   Activities of daily living details: does not need help using the phone, no transportation help needed, does not need help shopping, no meal preparation help needed, does not need help doing housework, does not need help doing laundry, does not need help managing medications and does not need help managing money  Home safety risk factors:  no unfamiliar surroundings, no loose rugs, no poor household lighting, no uneven floors, no household clutter, grab bars in the bathroom and handrails on the stairs  Advance Directives: Advance directives: no living will, no durable power of  for health care directives and no advance directives  Co-Managers and Medical Equipment/Suppliers: See Patient Care Team      Patient Care Team    Care Team Member Role Specialty Office Number   Mello Humphrey MD  Urology [phone]     Review of Systems    Constitutional: negative  Eyes: negative  ENT: negative  Cardiovascular: negative  Respiratory: negative  Gastrointestinal: negative  Genitourinary: negative  Musculoskeletal: Right knee pain  Integumentary and Breasts: negative  Neurological: negative  Psychiatric: negative  Endocrine: negative  Hematologic and Lymphatic: negative  Over the past 2 weeks, how often have you been bothered by the following problems? 1 ) Little interest or pleasure in doing things? Not at all    2 ) Feeling down, depressed or hopeless? Not at all    3 ) Trouble falling asleep or sleeping too much? Several days  4 ) Feeling tired or having little energy? Not at all    5 ) Poor appetite or overeating? Not at all    6 ) Feeling bad about yourself, or that you are a failure, or have let yourself or your family down? Not at all    7 ) Trouble concentrating on things, such as reading a newspaper or watching television? Not at all    8 ) Moving or speaking so slowly that other people could have noticed, or the opposite, moving or speaking faster than usual? Not at all  How difficult have these problems made it for you to do your work, take care of things at home, or get along with people? Not at all  Score 1      Active Problems    1  Anemia (285 9) (D64 9)   2   Anxiety (300 00) (F41 9)   3  Benign prostatic hyperplasia with elevated prostate specific antigen (PSA)   (600 00,790 93) (N40 0,R97 2)   4  CAD in native artery (414 01) (I25 10)   5  Elevated prostate specific antigen (PSA) (790 93) (R97 2)   6  Encounter for screening colonoscopy (V76 51) (Z12 11)   7  Esophageal reflux (530 81) (K21 9)   8  Fatigue (780 79) (R53 83)   9  Hearing Loss (389 9)   10  Hematuria (599 70) (R31 9)   11  Hyperlipidemia (272 4) (E78 5)   12  Hypertension (401 9) (I10)   13  Nocturia (788 43) (R35 1)   14  Osteopenia (733 90) (M85 80)   15  PPD screening test (V74 1) (Z11 1)   16  Special screening examination for neoplasm of prostate (V76 44) (Z12 5)   17  Urinary frequency (788 41) (R35 0)   18  Vitamin D deficiency (268 9) (E55 9)    Past Medical History    1  History of Diverticulosis (562 10) (K57 90)    The active problems and past medical history were reviewed and updated today  Surgical History    1  History of Aortic Valve Assessment   2  History of Arthroscopy Knee   3  History of Diagnostic Cystoscopy   4  History of Needle Biopsy Of Prostate    The surgical history was reviewed and updated today  Family History  Father    1  Family history of cardiac disorder (V17 49) (Z82 49)    The family history was reviewed and updated today  Social History    · Being A Social Drinker   · Denied: Drug Use (305 90)   · Marital History - Currently    · Never A Smoker   · Nonsmoker (V49 89) (Z78 9)  The social history was reviewed and updated today  Current Meds   1  ALPRAZolam 0 25 MG Oral Tablet; TAKE 1 TABLET 3 times a day; Therapy: [id number] to (Last Rx:[id number]) Ordered   2  Aspirin 81 MG TABS; Therapy: (Keenan Lopez) to Recorded   3  Simvastatin 20 MG Oral Tablet; Take 1 tablet daily; Therapy: 97TPE9532 to (Last Rx:[id number])  Requested for: 77Jxk1736 Ordered   4  Terazosin HCl - 5 MG Oral Capsule; TAKE 1 CAPSULE Daily;    Therapy: 39FLK0805 to (Evaluate:18Mar2017)  Requested for: 75CIW3637; Last   Rx:23Mar2016 Ordered    The medication list was reviewed and updated today  Allergies    1  No Known Drug Allergies    Immunizations  PPD --- Debby Ohms: 06-Aug-2014   Tdap --- Debby Ohms: 06-Mar-2007     Vitals  Signs   Recorded: 36MUF0000 28:29YO   Systolic: 394  Diastolic: 86  Heart Rate: 60  Temperature: 97 F  O2 Saturation: 99  Height: 6 ft 2 in  Weight: 183 lb 8 0 oz  BMI Calculated: 23 56  BSA Calculated: 2 1    Results/Data  PHQ-2 Adult Depression Screening [id number] 08:10AM User, Ahs     Test Name Result Flag Reference   PHQ-2 Adult Depression Score 0     Over the last two weeks, how often have you been bothered by any of the following problems? Little interest or pleasure in doing things: Not at all - 0  Feeling down, depressed, or hopeless: Not at all - 0   PHQ-2 Adult Depression Screening Negative       Falls Risk Assessment (Dx Z13 89 Screen for Neurologic Disorder) 02IDS0782 08:10AM User, [facility]     Test Name Result Flag Reference   Falls Risk      No falls in the past year       Health Management  Encounter for screening colonoscopy   COLONOSCOPY; every 10 years; Last 38MDT3573; Next Due: 08YHO8471;  Active    Future Appointments    Date/Time Provider Specialty Site   01/12/2017 10:15 AM Caitie Shaw MD Urology [address]     Signatures   Electronically signed by : Dori Hassan DO; Sep  9 2016  9:17AM EST                       (Author)

## 2018-03-19 DIAGNOSIS — Z13.29 SCREENING FOR THYROID DISORDER: ICD-10-CM

## 2018-03-19 DIAGNOSIS — Z13.220 ENCOUNTER FOR SCREENING FOR LIPID DISORDER: ICD-10-CM

## 2018-03-19 DIAGNOSIS — Z13.1 SCREENING FOR DIABETES MELLITUS (DM): ICD-10-CM

## 2018-03-19 DIAGNOSIS — Z13.0 SCREENING FOR IRON DEFICIENCY ANEMIA: Primary | ICD-10-CM

## 2018-03-19 DIAGNOSIS — Z12.5 ENCOUNTER FOR SCREENING FOR MALIGNANT NEOPLASM OF PROSTATE: ICD-10-CM

## 2018-03-19 DIAGNOSIS — Z11.59 NEED FOR HEPATITIS C SCREENING TEST: ICD-10-CM

## 2018-03-26 DIAGNOSIS — N40.1 BPH WITH URINARY OBSTRUCTION: Primary | ICD-10-CM

## 2018-03-26 DIAGNOSIS — N13.8 BPH WITH URINARY OBSTRUCTION: Primary | ICD-10-CM

## 2018-03-26 RX ORDER — TERAZOSIN 5 MG/1
CAPSULE ORAL
Qty: 90 CAPSULE | Refills: 3 | Status: SHIPPED | OUTPATIENT
Start: 2018-03-26 | End: 2019-01-23 | Stop reason: SDUPTHER

## 2018-04-26 ENCOUNTER — OFFICE VISIT (OUTPATIENT)
Dept: FAMILY MEDICINE CLINIC | Facility: CLINIC | Age: 73
End: 2018-04-26
Payer: MEDICARE

## 2018-04-26 VITALS
TEMPERATURE: 97.7 F | BODY MASS INDEX: 23.87 KG/M2 | SYSTOLIC BLOOD PRESSURE: 148 MMHG | HEIGHT: 74 IN | OXYGEN SATURATION: 97 % | HEART RATE: 52 BPM | DIASTOLIC BLOOD PRESSURE: 80 MMHG | WEIGHT: 186 LBS

## 2018-04-26 DIAGNOSIS — Z01.818 PREOPERATIVE EVALUATION OF A MEDICAL CONDITION TO RULE OUT SURGICAL CONTRAINDICATIONS (TAR REQUIRED): Primary | ICD-10-CM

## 2018-04-26 DIAGNOSIS — R97.20 BENIGN PROSTATIC HYPERPLASIA WITH ELEVATED PROSTATE SPECIFIC ANTIGEN (PSA): ICD-10-CM

## 2018-04-26 DIAGNOSIS — N40.0 BENIGN PROSTATIC HYPERPLASIA WITH ELEVATED PROSTATE SPECIFIC ANTIGEN (PSA): ICD-10-CM

## 2018-04-26 DIAGNOSIS — I25.10 CAD IN NATIVE ARTERY: ICD-10-CM

## 2018-04-26 LAB — HBA1C MFR BLD HPLC: 5.5 %

## 2018-04-26 PROCEDURE — 99214 OFFICE O/P EST MOD 30 MIN: CPT | Performed by: FAMILY MEDICINE

## 2018-04-26 RX ORDER — TERAZOSIN 5 MG/1
1 CAPSULE ORAL DAILY
COMMUNITY
Start: 2014-02-12 | End: 2019-11-06 | Stop reason: ALTCHOICE

## 2018-04-26 RX ORDER — ALPRAZOLAM 0.25 MG/1
1 TABLET ORAL 3 TIMES DAILY
COMMUNITY
Start: 2015-07-24 | End: 2018-07-31 | Stop reason: SDUPTHER

## 2018-04-26 RX ORDER — SIMVASTATIN 20 MG
TABLET ORAL
COMMUNITY
Start: 2018-03-23 | End: 2019-01-23 | Stop reason: SDUPTHER

## 2018-04-26 NOTE — PATIENT INSTRUCTIONS
Joint Replacement Surgery   AMBULATORY CARE:   What do I need to know about joint replacement surgery? A joint that is damaged by injury or disease can be removed and replaced with a new one  There are times when only a part of the joint needs to be replaced or repaired  Your healthcare provider may try other treatments before joint replacement surgery, such as steroid injections or medicines  Pain relief and increased function are the goals of joint replacement  Knee, hip, and shoulder joints are the most common joints replaced  Joints in your elbows, fingers, and ankles can also be repaired or replaced  How do I prepare for joint replacement surgery? Your healthcare provider will talk to you about how to prepare for surgery  He may tell you not to eat or drink anything after midnight on the day of your surgery  He will tell you what medicines to take or not take on the day of your surgery  What will happen during joint replacement surgery? You may be given medicine to keep you asleep and pain free during your surgery  You may be given an injection of medicine that stops the pain in the area of surgery  Your healthcare provider will remove the damaged joint and replace it with a new one  Your new joint may be made out of metal, plastic, or other materials  Your new joint may allow bone to grow or it may be cemented into place  It may also be cemented if your bones are weak or of poor quality  A drain may be placed to remove extra blood and fluids from the surgery area  Your incision will be closed with stitches or staples and covered with a bandage  What will happen after joint replacement surgery? After surgery, you will probably need to stay in the hospital for a few days  You may need to wear pressure stockings and take blood thinner medicine to help prevent blood clots in your legs  You may need support devices, such as a walker, crutches, or wheel chair   You may have an immobilizer, splint, brace, or cast  You may need physical therapy even after being discharged from the hospital    What are the risks of joint replacement surgery? Your risks of infection, bleeding, and blood clots increase with surgery  You may be allergic to the material used in your new joint  Nerves, muscles, tendons, and blood vessels near your joint may become damaged during surgery  The new joint may loosen or come out of the socket  The materials used to make your new joint may wear thin or loosen  You may need another surgery if you have any of these problems  © 2017 [facility] Information is for End User's use only and may not be sold, redistributed or otherwise used for commercial purposes  All illustrations and images included in CareNotes® are the copyrighted property of [employer] M [employer]  or [address]  The above information is an  only  It is not intended as medical advice for individual conditions or treatments  Talk to your doctor, nurse or pharmacist before following any medical regimen to see if it is safe and effective for you

## 2018-04-26 NOTE — PROGRESS NOTES
[facility] PRE-OPERATIVE EVALUATION  [facility] PHYSICIAN [facility] PRACTICE    NAME: Robina Resendiz  AGE: 72 y o  SEX: male  : 1945     DATE: 2018    [facility] Pre-Operative Evaluation      Chief Complaint: Pre-operative Evaluation     Surgery:  RIGHT KNEE ARTHROPLASTY  Anticipated Date of Surgery:  MAY 10TH  Referring Provider: No ref  provider found       History of Present Illness:     Robina Resendiz is a 72 y o  male who presents to the office today for a preoperative consultation at the request of surgeon,  Michelle Gusman, who plans on performing  ARTHROPLASTY on  RIGHT KNEE  Planned anesthesia is spinal  Patient has a bleeding risk of: no recent abnormal bleeding  Patient does not have objections to receiving blood products if needed  Current anti-platelet/anti-coagulation medications that the patient is prescribed includes: Aspirin  CURRENT LEWIS TAKING     Assessment of Chronic Conditions:   - Coronary Artery Disease: TWELVE YEARS AGO WITH PTCA     Assessment of Cardiac Risk:  · Reports unstable or severe angina or MI in the last 6 weeks or history of stent placement in the last year   · Denies decompensated heart failure (e g  New onset heart failure, NYHA functional class IV heart failure, or worsening existing heart failure)  · Denies significant arrhythmias such as high grade AV block, symptomatic ventricular arrhythmia, newly recognized ventricular tachycardia, supraventricular tachycardia with resting heart rate >100, or symptomatic bradycardia  · Denies severe heart valve disease including aortic stenosis or symptomatic mitral stenosis     Exercise Capacity:  · Able to walk 4 blocks without symptoms?: Yes  · Able to walk 2 flights without symptoms?: Yes    Prior Anesthesia Reactions: No     Personal history of venous thromboembolic disease? No    History of steroid use for >2 weeks within last year?  No         Review of Systems:     Review of Systems   Constitutional: Negative for activity change, appetite change, chills, diaphoresis, fatigue and unexpected weight change  HENT: Negative for congestion, ear discharge, ear pain, hearing loss, nosebleeds and rhinorrhea  Eyes: Negative for pain, redness, itching and visual disturbance  Respiratory: Negative for cough, choking, chest tightness and shortness of breath  Cardiovascular: Negative for chest pain and leg swelling  Gastrointestinal: Negative for abdominal pain, blood in stool, constipation, diarrhea and nausea  Endocrine: Negative for cold intolerance, polydipsia and polyphagia  Genitourinary: Positive for hematuria  Negative for dysuria, frequency and urgency  Musculoskeletal: Positive for arthralgias and gait problem  Negative for back pain, joint swelling, neck pain and neck stiffness  Skin: Negative for color change and rash  Allergic/Immunologic: Negative for environmental allergies and food allergies  Neurological: Negative for dizziness, tremors, seizures, speech difficulty, numbness and headaches  Hematological: Negative for adenopathy  Does not bruise/bleed easily  Psychiatric/Behavioral: Negative for behavioral problems, dysphoric mood, hallucinations and self-injury         Current Problem List:     Patient Active Problem List   Diagnosis    Anxiety    Benign prostatic hyperplasia with elevated prostate specific antigen (PSA)    CAD in native artery    Esophageal reflux    Hyperlipidemia    Hypertension    Osteopenia    Vitamin D deficiency       Allergies:     No Known Allergies    Current Medications:       Current Outpatient Prescriptions:     ALPRAZolam (XANAX) 0 25 mg tablet, Take 1 tablet by mouth 3 (three) times a day, Disp: , Rfl:     aspirin 81 MG tablet, Take by mouth, Disp: , Rfl:     Misc Natural Products (DAILY HERBS PROSTATE PO), Take by mouth, Disp: , Rfl:     simvastatin (ZOCOR) 20 mg tablet, , Disp: , Rfl:     terazosin (HYTRIN) 5 mg capsule, TAKE 1 CAPSULE EVERY DAY, Disp: 90 capsule, Rfl: 3    terazosin (HYTRIN) 5 mg capsule, Take 1 capsule by mouth daily, Disp: , Rfl:     Past Medical History:       Past Medical History:   Diagnosis Date    BPH (benign prostatic hyperplasia)     Coronary artery disease     Diverticulosis         Past Surgical History:   Procedure Laterality Date    AORTIC VALVE SURGERY      Assessment    CORONARY ANGIOPLASTY WITH STENT PLACEMENT      CYSTOSCOPY  01/23/2014    Diagnostic    KNEE ARTHROSCOPY      PROSTATE BIOPSY  01/23/2014        Family History   Problem Relation Age of Onset    Other Father      Cardiac Disorder        Social History     Social History    Marital status: /Civil Union     Spouse name: N/A    Number of children: N/A    Years of education: N/A     Occupational History    Not on file  Social History Main Topics    Smoking status: Never Smoker    Smokeless tobacco: Never Used    Alcohol use Yes      Comment: social    Drug use: No    Sexual activity: Not on file     Other Topics Concern    Not on file     Social History Narrative    No narrative on file        Physical Exam:     /80   Pulse (!) 52   Temp 97 7 °F (36 5 °C)   Ht 6' 2" (1 88 m)   Wt 84 4 kg (186 lb)   SpO2 97%   BMI 23 88 kg/m²     Physical Exam   Constitutional: He is oriented to person, place, and time  He appears well-developed and well-nourished  No distress  HENT:   Head: Normocephalic and atraumatic  Right Ear: External ear normal    Left Ear: External ear normal    Nose: Nose normal    Mouth/Throat: Oropharynx is clear and moist  No oropharyngeal exudate  Eyes: Conjunctivae and EOM are normal  Pupils are equal, round, and reactive to light  Right eye exhibits no discharge  Left eye exhibits no discharge  No scleral icterus  Neck: Normal range of motion  Neck supple  No thyromegaly present  Cardiovascular: Normal rate, regular rhythm and normal heart sounds  No murmur heard    Pulmonary/Chest: Effort normal and breath sounds normal  He has no wheezes  He has no rales  Abdominal: Soft  Bowel sounds are normal  He exhibits no mass  There is no tenderness  There is no guarding  Musculoskeletal: Normal range of motion  He exhibits tenderness  He exhibits no edema  TENDER RIGHT KNEE   Lymphadenopathy:     He has no cervical adenopathy  Neurological: He is alert and oriented to person, place, and time  He has normal reflexes  Skin: Skin is warm and dry  He is not diaphoretic  Psychiatric: He has a normal mood and affect  Judgment and thought content normal         Data:     Pre-operative work-up    Laboratory Results: I have personally reviewed the pertinent laboratory results/reports      EKG: I have personally reviewed pertinent reports  Chest x-ray: I have personally reviewed pertinent reports  Previous cardiopulmonary studies within the past year:  · Echocardiogram:  NONE  · Cardiac Catheterization:  NONE  · Stress Test:  NONE  · Pulmonary Function Testing:  NONE      Assessment & Recommendations:     1  Preoperative evaluation of a medical condition to rule out surgical contraindications (TAR required)     2  CAD in native artery     3  Benign prostatic hyperplasia with elevated prostate specific antigen (PSA)         Pre-Op Evaluation Assessment  72 y o  male with planned surgery:  KNEE ARTHROPLASTY  Known risk factors for perioperative complications: None  Current medications which may produce withdrawal symptoms if withheld perioperatively:  NONE  Pre-Op Evaluation Plan  1  Further preoperative workup as follows:   - None; no further preoperative work-up is required    2  Medication Management/Recommendations:   - None, continue medication regimen including morning of surgery, with sip of water    3  Prophylaxis for cardiac events with perioperative beta-blockers: not indicated      4  Patient requires further consultation with: Cardiology    Clearance  Patient is CLEARED for surgery without any additional cardiac testing       DO NITHYA Torrez [facility]  [address] [place] [id number]  Phone#  [phone]  Fax#  [phone]

## 2018-05-08 ENCOUNTER — TELEPHONE (OUTPATIENT)
Dept: FAMILY MEDICINE CLINIC | Facility: CLINIC | Age: 73
End: 2018-05-08

## 2018-05-08 NOTE — TELEPHONE ENCOUNTER
Dr Hugo Gardner from [facility] called to inform you that you patient just had a Right knee replacement done today and that he is doing well  Discharge is planned for either tomorrow or Thursday  This is just an [place]  Thanks

## 2018-05-30 ENCOUNTER — OFFICE VISIT (OUTPATIENT)
Dept: FAMILY MEDICINE CLINIC | Facility: CLINIC | Age: 73
End: 2018-05-30
Payer: MEDICARE

## 2018-05-30 VITALS
SYSTOLIC BLOOD PRESSURE: 140 MMHG | OXYGEN SATURATION: 98 % | DIASTOLIC BLOOD PRESSURE: 80 MMHG | WEIGHT: 185 LBS | BODY MASS INDEX: 23.74 KG/M2 | HEIGHT: 74 IN | TEMPERATURE: 98.2 F | HEART RATE: 69 BPM

## 2018-05-30 DIAGNOSIS — Z96.651 TOTAL KNEE REPLACEMENT STATUS, RIGHT: ICD-10-CM

## 2018-05-30 DIAGNOSIS — R61 NIGHT SWEATS: Primary | ICD-10-CM

## 2018-05-30 PROCEDURE — 99213 OFFICE O/P EST LOW 20 MIN: CPT | Performed by: FAMILY MEDICINE

## 2018-05-30 RX ORDER — CELECOXIB 200 MG/1
CAPSULE ORAL
COMMUNITY
Start: 2018-05-10 | End: 2019-11-06 | Stop reason: ALTCHOICE

## 2018-05-30 RX ORDER — OXYCODONE HYDROCHLORIDE 10 MG/1
TABLET ORAL
COMMUNITY
Start: 2018-05-10 | End: 2019-11-06 | Stop reason: ALTCHOICE

## 2018-05-30 NOTE — PATIENT INSTRUCTIONS
Knee Replacement   AMBULATORY CARE:   What you need to know about knee replacement:  Knee replacement is surgery to replace all or part of your knee joint  It is also called knee arthroplasty  How to prepare for knee replacement:   · Weeks before your surgery:      ¨ Your healthcare provider may have you do exercises to strengthen your leg muscles before surgery  ¨ You may need to stop taking blood thinning medicines such as aspirin and ibuprofen  Ask your healthcare provider which medicines you need to stop  Also ask how long before surgery to stop taking them  ¨ You may need x-rays to help your healthcare provider plan your surgery  Ask about any other tests you may need  · The night before and the day of surgery:      ¨ Your healthcare provider may tell you not to eat or drink anything after midnight on the day of your surgery  ¨ You will be told what medicines you can or cannot take the morning of surgery  What will happen during knee replacement:   · You may be given general anesthesia to keep you asleep and free from pain during surgery  You may instead be given a spinal or epidural anesthesia  This type of anesthesia keeps you numb from the waist down, but you will be awake throughout surgery  Your surgeon will make an incision over your knee joint  He or she will remove the damaged parts of your knee joint and replace them with a knee implant  The knee implant may be made of metal and plastic  Your surgeon may secure it with medical cement  · Your surgeon will move the muscles and other tissues around your joint back into place  He or she will close your incision with stitches or staples  He or she may uses strips of medical tape and a bandage to cover your wound  What will happen after knee replacement:  You will be in the hospital 1 to 4 days  Do not get out of bed until your healthcare provider says it is okay   The physical therapist will help you walk the first day after surgery  When you walk the day after surgery, it helps decrease pain and improves the function of your knee  You may use crutches or a walker to walk  You will need physical therapy to teach you exercises  The exercises will help strengthen your knee and prevent stiffness  You may also need occupational therapy to teach you the best ways to bathe and dress  Risks of knee replacement:  You may bleed more than expected or get an infection  Nerves or blood vessels may be damaged during surgery  After surgery, your knee may be stiff or numb  You may continue to have knee pain  You may get a blood clot in your leg  This may become life-threatening  Your implant may get loose or move out of place  The implant may get worn out over time and need to be replaced  Call 911 for any of the following:   · You feel lightheaded, short of breath, and have chest pain  · You cough up blood  Seek care immediately if:   · Your leg feels warm, tender, and painful  It may look swollen and red  · You cannot walk or move your knee  · Blood soaks through your bandage  · Your incision comes apart  · Your incision is red, swollen, or draining pus  Contact your healthcare provider if:   · You have a fever or chills  · You have trouble moving or bending your knee  · You have new knee pain or pain that does not get better after medicine  · You have questions or concerns about your condition or care  Medicines: You may  need any of the following:  · Prescription pain medicine  may be given  Ask your healthcare provider how to take this medicine safely  Some prescription pain medicines contain acetaminophen  Do not take other medicines that contain acetaminophen without talking to your healthcare provider  Too much acetaminophen may cause liver damage  Prescription pain medicine may cause constipation  Ask your healthcare provider how to prevent or treat constipation       · NSAIDs , such as ibuprofen, help decrease swelling, pain, and fever  This medicine is available with or without a doctor's order  NSAIDs can cause stomach bleeding or kidney problems in certain people  If you take blood thinner medicine, always ask your healthcare provider if NSAIDs are safe for you  Always read the medicine label and follow directions  · Blood thinners    help prevent blood clots  Examples of blood thinners include heparin and warfarin  Clots can cause strokes, heart attacks, and death  The following are general safety guidelines to follow while you are taking a blood thinner:    ¨ Watch for bleeding and bruising while you take blood thinners  Watch for bleeding from your gums or nose  Watch for blood in your urine and bowel movements  Use a soft washcloth on your skin, and a soft toothbrush to brush your teeth  This can keep your skin and gums from bleeding  If you shave, use an electric shaver  Do not play contact sports  ¨ Tell your dentist and other healthcare providers that you take anticoagulants  Wear a bracelet or necklace that says you take this medicine  ¨ Do not start or stop any medicines unless your healthcare provider tells you to  Many medicines cannot be used with blood thinners  ¨ Tell your healthcare provider right away if you forget to take the medicine, or if you take too much  ¨ Warfarin  is a blood thinner that you may need to take  The following are things you should be aware of if you take warfarin  § Foods and medicines can affect the amount of warfarin in your blood  Do not make major changes to your diet while you take warfarin  Warfarin works best when you eat about the same amount of vitamin K every day  Vitamin K is found in green leafy vegetables and certain other foods  Ask for more information about what to eat when you are taking warfarin  § You will need to see your healthcare provider for follow-up visits when you are on warfarin  You will need regular blood tests   These tests are used to decide how much medicine you need  · Take your medicine as directed  Contact your healthcare provider if you think your medicine is not helping or if you have side effects  Tell him or her if you are allergic to any medicine  Keep a list of the medicines, vitamins, and herbs you take  Include the amounts, and when and why you take them  Bring the list or the pill bottles to follow-up visits  Carry your medicine list with you in case of an emergency  Therapies:   · A physical therapist  will teach you exercises to strengthen the muscles around your knee  The exercises will help decrease pain and swelling  Do your exercises as many times as directed  The therapist will show you how to move without damaging your knee  · An occupational therapist  will show you how to do daily activities safely  He or she will also get assistive devices to help you dress, bathe, and  things  The assistive devices will help keep you from twisting and bending which can damage your knee  Activity:   · Go up the stairs  by placing your non-operated leg on the step first  Then bring your operated leg to the same step  Repeat  · Go down stairs  by placing your operated leg down on the step first  Then bring your non-operated leg to the same step  Repeat  · Use assistive devices as directed  Your thigh muscles will be weak after surgery  Assistive devices will help you not fall  · Do not cross your legs for 6 weeks or as directed  Your risk for blood clots is greater when you cross your legs  You can also move your implant out of place  · Do light housekeeping duties  Ask your healthcare provider which duties would be okay for you to do  He or she may tell you it is okay to dust or do dishes  Avoid vacuuming, changing the bed, or any duty that has you reaching up, bending or twisting  · Do not drive for at least 6 weeks  or until your healthcare provider says it is okay       · Do not play tennis, golf, or ski  until your healthcare provider says it is okay  These activities and contact sports can loosen your knee implant  Care for the incision area as directed:  Do not get the area wet until your healthcare provider says it is okay  Carefully wash the area with soap and water  Dry the area and put on new, clean bandages as directed  Change your bandages when they get wet or dirty  Do not put powders or lotions over the area  If you have strips of medical tape over your incision area, let them dry up and fall off on their own  If they do not fall off within 14 days, gently remove them  Check the area every day for signs of infection, such as swelling, redness, or pus  Self-care:   · Apply ice  on your knee for 15 to 20 minutes every hour or as directed  Use an ice pack, or put crushed ice in a plastic bag  Cover it with a towel  Ice helps prevent tissue damage and decreases swelling and pain  · Do not soak in a tub or pool  until your incision area is healed or your healthcare provider says it is okay  · Carry your ID card for your joint replacement at all times  All healthcare providers need to know about your joint replacement  You may need antibiotics before any procedure to prevent an infection of your new joint  The metal in your new joint may set off metal detectors  You will not be able to have an MRI because of the metal in your joint  Prevent falls:   · Remove all loose carpets and cords  These can cause you to trip and fall  · Use a shower bench or chair  when you take a shower to limit the time you are standing  · Use a toilet seat riser with arms  if your toilet seat is low  A toilet seat riser will help prevent bending or twisting your knee  · Know your limits  Start activities slowly and give yourself rest periods  Pain and swelling can increase when you do too much  Do not do an activity until your healthcare provider says you are ready    Follow up with your healthcare provider as directed:  Write down your questions so you remember to ask them during your visits  © 2017 [employer] Information is for End User's use only and may not be sold, redistributed or otherwise used for commercial purposes  All illustrations and images included in CareNotes® are the copyrighted property of A [employer] M [employer]  or [employer]  The above information is an  only  It is not intended as medical advice for individual conditions or treatments  Talk to your doctor, nurse or pharmacist before following any medical regimen to see if it is safe and effective for you

## 2018-05-30 NOTE — PROGRESS NOTES
Assessment/Plan:    No problem-specific Assessment & Plan notes found for this encounter  Diagnoses and all orders for this visit:    Night sweats  -     XR chest pa & lateral; Future    Total knee replacement status, right    Other orders  -     celecoxib (CeleBREX) 200 mg capsule;   -     oxyCODONE (ROXICODONE) 10 MG TABS; Earliest Fill Date: 5/10/18           I have asked the patient to get a chest x-ray in light of the night sweats  He is due to have the rest of his blood work done later in the summer  Consider using gabapentin if his pain worsens  Subjective:      Patient ID: Jenny Solano is a 72 y o  male  The patient is here for follow-up after undergoing a right total knee replacement  He has about 3 weeks out from his surgery  He has been attending physical therapy  He had 1 episode of  Hypotension post surgery but otherwise has been doing well  He does report a new finding of night sweats since 1 week prior to his surgery  He has had no weight loss however  He has had no fever or chills  He has been using ibuprofen for the pain in his right leg  He wants to start going to the pool  He has no dental complaints  The following portions of the patient's history were reviewed and updated as appropriate: allergies, current medications, past family history, past medical history, past social history, past surgical history and problem list     Review of Systems   Constitutional: Negative for activity change, appetite change, chills, diaphoresis, fatigue and unexpected weight change  Night sweats   HENT: Negative for congestion, ear discharge, ear pain, hearing loss, nosebleeds and rhinorrhea  Eyes: Negative for pain, redness, itching and visual disturbance  Respiratory: Negative for cough, choking, chest tightness and shortness of breath  Cardiovascular: Negative for chest pain and leg swelling     Gastrointestinal: Negative for abdominal pain, blood in stool, constipation, diarrhea and nausea  Endocrine: Negative for cold intolerance, polydipsia and polyphagia  Genitourinary: Negative for dysuria, frequency, hematuria and urgency  Musculoskeletal: Positive for arthralgias and joint swelling  Negative for back pain, gait problem, neck pain and neck stiffness  Skin: Negative for color change and rash  Allergic/Immunologic: Negative for environmental allergies and food allergies  Neurological: Negative for dizziness, tremors, seizures, speech difficulty, numbness and headaches  Hematological: Negative for adenopathy  Does not bruise/bleed easily  Psychiatric/Behavioral: Negative for behavioral problems, dysphoric mood, hallucinations and self-injury  Objective:  Vitals:    05/30/18 1140   BP: 140/80   Pulse: 69   Temp: 98 2 °F (36 8 °C)   SpO2: 98%   Weight: 83 9 kg (185 lb)   Height: 6' 2" (1 88 m)      Physical Exam   Constitutional: He is oriented to person, place, and time  He appears well-developed and well-nourished  No distress  HENT:   Head: Normocephalic and atraumatic  Right Ear: External ear normal    Left Ear: External ear normal    Nose: Nose normal    Mouth/Throat: Oropharynx is clear and moist  No oropharyngeal exudate  Eyes: Conjunctivae and EOM are normal  Pupils are equal, round, and reactive to light  Right eye exhibits no discharge  Left eye exhibits no discharge  No scleral icterus  Neck: Normal range of motion  Neck supple  No thyromegaly present  Cardiovascular: Normal rate, regular rhythm and normal heart sounds  No murmur heard  Pulmonary/Chest: Effort normal and breath sounds normal  He has no wheezes  He has no rales  Abdominal: Soft  Bowel sounds are normal  He exhibits no mass  There is no tenderness  There is no guarding  Musculoskeletal: Normal range of motion  He exhibits no edema or tenderness  Well healed scar over the right anterior knee   Lymphadenopathy:     He has no cervical adenopathy     Neurological: He is alert and oriented to person, place, and time  He has normal reflexes  Skin: Skin is warm and dry  He is not diaphoretic  Psychiatric: He has a normal mood and affect   Judgment and thought content normal

## 2018-06-19 ENCOUNTER — OFFICE VISIT (OUTPATIENT)
Dept: UROLOGY | Facility: AMBULATORY SURGERY CENTER | Age: 73
End: 2018-06-19
Payer: MEDICARE

## 2018-06-19 VITALS
WEIGHT: 183 LBS | HEIGHT: 74 IN | HEART RATE: 64 BPM | DIASTOLIC BLOOD PRESSURE: 86 MMHG | BODY MASS INDEX: 23.49 KG/M2 | SYSTOLIC BLOOD PRESSURE: 140 MMHG

## 2018-06-19 DIAGNOSIS — R35.0 BENIGN PROSTATIC HYPERPLASIA WITH URINARY FREQUENCY: Primary | ICD-10-CM

## 2018-06-19 DIAGNOSIS — Z12.5 SCREENING FOR PROSTATE CANCER: ICD-10-CM

## 2018-06-19 DIAGNOSIS — N40.1 BENIGN PROSTATIC HYPERPLASIA WITH URINARY FREQUENCY: Primary | ICD-10-CM

## 2018-06-19 PROCEDURE — 99213 OFFICE O/P EST LOW 20 MIN: CPT | Performed by: UROLOGY

## 2018-07-31 DIAGNOSIS — F41.9 ANXIETY: Primary | ICD-10-CM

## 2018-07-31 RX ORDER — ALPRAZOLAM 0.25 MG/1
0.25 TABLET ORAL 3 TIMES DAILY
Qty: 30 TABLET | Refills: 0 | Status: SHIPPED | OUTPATIENT
Start: 2018-07-31

## 2018-12-07 ENCOUNTER — APPOINTMENT (OUTPATIENT)
Dept: LAB | Facility: CLINIC | Age: 73
End: 2018-12-07
Payer: MEDICARE

## 2018-12-07 ENCOUNTER — TRANSCRIBE ORDERS (OUTPATIENT)
Dept: LAB | Facility: CLINIC | Age: 73
End: 2018-12-07

## 2018-12-07 DIAGNOSIS — Z13.1 SCREENING FOR DIABETES MELLITUS (DM): ICD-10-CM

## 2018-12-07 DIAGNOSIS — Z12.5 ENCOUNTER FOR SCREENING FOR MALIGNANT NEOPLASM OF PROSTATE: ICD-10-CM

## 2018-12-07 DIAGNOSIS — Z11.59 NEED FOR HEPATITIS C SCREENING TEST: ICD-10-CM

## 2018-12-07 DIAGNOSIS — Z12.5 SCREENING FOR PROSTATE CANCER: ICD-10-CM

## 2018-12-07 DIAGNOSIS — Z13.220 ENCOUNTER FOR SCREENING FOR LIPID DISORDER: ICD-10-CM

## 2018-12-07 DIAGNOSIS — Z13.0 SCREENING FOR IRON DEFICIENCY ANEMIA: ICD-10-CM

## 2018-12-07 DIAGNOSIS — Z13.29 SCREENING FOR THYROID DISORDER: ICD-10-CM

## 2018-12-07 LAB
ALBUMIN SERPL BCP-MCNC: 3.7 G/DL (ref 3.5–5)
ALP SERPL-CCNC: 50 U/L (ref 46–116)
ALT SERPL W P-5'-P-CCNC: 29 U/L (ref 12–78)
ANION GAP SERPL CALCULATED.3IONS-SCNC: 9 MMOL/L (ref 4–13)
AST SERPL W P-5'-P-CCNC: 20 U/L (ref 5–45)
BASOPHILS # BLD AUTO: 0.06 THOUSANDS/ΜL (ref 0–0.1)
BASOPHILS NFR BLD AUTO: 1 % (ref 0–1)
BILIRUB SERPL-MCNC: 0.36 MG/DL (ref 0.2–1)
BUN SERPL-MCNC: 14 MG/DL (ref 5–25)
CALCIUM SERPL-MCNC: 8.8 MG/DL (ref 8.3–10.1)
CHLORIDE SERPL-SCNC: 103 MMOL/L (ref 100–108)
CHOLEST SERPL-MCNC: 125 MG/DL (ref 50–200)
CO2 SERPL-SCNC: 28 MMOL/L (ref 21–32)
CREAT SERPL-MCNC: 0.78 MG/DL (ref 0.6–1.3)
EOSINOPHIL # BLD AUTO: 0.15 THOUSAND/ΜL (ref 0–0.61)
EOSINOPHIL NFR BLD AUTO: 3 % (ref 0–6)
ERYTHROCYTE [DISTWIDTH] IN BLOOD BY AUTOMATED COUNT: 13.4 % (ref 11.6–15.1)
GFR SERPL CREATININE-BSD FRML MDRD: 91 ML/MIN/1.73SQ M
GLUCOSE P FAST SERPL-MCNC: 79 MG/DL (ref 65–99)
HCT VFR BLD AUTO: 43.8 % (ref 36.5–49.3)
HCV AB SER QL: NORMAL
HDLC SERPL-MCNC: 55 MG/DL (ref 40–60)
HGB BLD-MCNC: 14.7 G/DL (ref 12–17)
IMM GRANULOCYTES # BLD AUTO: 0.02 THOUSAND/UL (ref 0–0.2)
IMM GRANULOCYTES NFR BLD AUTO: 0 % (ref 0–2)
LDLC SERPL CALC-MCNC: 55 MG/DL (ref 0–100)
LYMPHOCYTES # BLD AUTO: 2.03 THOUSANDS/ΜL (ref 0.6–4.47)
LYMPHOCYTES NFR BLD AUTO: 36 % (ref 14–44)
MCH RBC QN AUTO: 30.4 PG (ref 26.8–34.3)
MCHC RBC AUTO-ENTMCNC: 33.6 G/DL (ref 31.4–37.4)
MCV RBC AUTO: 91 FL (ref 82–98)
MONOCYTES # BLD AUTO: 0.51 THOUSAND/ΜL (ref 0.17–1.22)
MONOCYTES NFR BLD AUTO: 9 % (ref 4–12)
NEUTROPHILS # BLD AUTO: 2.85 THOUSANDS/ΜL (ref 1.85–7.62)
NEUTS SEG NFR BLD AUTO: 51 % (ref 43–75)
NONHDLC SERPL-MCNC: 70 MG/DL
NRBC BLD AUTO-RTO: 0 /100 WBCS
PLATELET # BLD AUTO: 145 THOUSANDS/UL (ref 149–390)
PMV BLD AUTO: 11.2 FL (ref 8.9–12.7)
POTASSIUM SERPL-SCNC: 3.8 MMOL/L (ref 3.5–5.3)
PROT SERPL-MCNC: 7.5 G/DL (ref 6.4–8.2)
PSA SERPL-MCNC: 6.2 NG/ML (ref 0–4)
RBC # BLD AUTO: 4.84 MILLION/UL (ref 3.88–5.62)
SODIUM SERPL-SCNC: 140 MMOL/L (ref 136–145)
TRIGL SERPL-MCNC: 75 MG/DL
TSH SERPL DL<=0.05 MIU/L-ACNC: 2.26 UIU/ML (ref 0.36–3.74)
WBC # BLD AUTO: 5.62 THOUSAND/UL (ref 4.31–10.16)

## 2018-12-07 PROCEDURE — G0103 PSA SCREENING: HCPCS

## 2018-12-07 PROCEDURE — 80053 COMPREHEN METABOLIC PANEL: CPT

## 2018-12-07 PROCEDURE — 84443 ASSAY THYROID STIM HORMONE: CPT

## 2018-12-07 PROCEDURE — 80061 LIPID PANEL: CPT

## 2018-12-07 PROCEDURE — 86803 HEPATITIS C AB TEST: CPT

## 2018-12-07 PROCEDURE — 36415 COLL VENOUS BLD VENIPUNCTURE: CPT

## 2018-12-07 PROCEDURE — 85025 COMPLETE CBC W/AUTO DIFF WBC: CPT

## 2019-01-03 NOTE — PROGRESS NOTES
1/7/2019    Gloria Fail  1945  [id number]      Assessment  -Elevated PSA s/p negative prostate biopsy x2 (2014)  -BPH with lower urinary tract symptoms  -Bladder stones    Discussion/Plan  Julianna Bailey is a 72 y o  male being managed by Dr Hussein Cabral  -PVR is 43cc  -Patient unable to provide sufficient urine volume for accurate Uroflow measurement   -We reviewed results of recent PSA which is 6 2, previously 6 4  Patient is status post negative prostate biopsy x2  His PSA ranges from 4 6-6 4  -We discussed patient's lower urinary tract symptoms of frequency, nocturia, as well as recurrence of hematuria  As previously recommended, we discussed prescribing finasteride  However he continues to defer  He will continue to take Terazosin daily  Patient also wishes to continue using stinging nettle  -Follow up in 1 year with PSA, PVR, and uroflow  He was instructed to call with any issues  -All questions answered, patient agrees with plan      History of Present Illness  72 y o  male with a history of elevated PSA s/p negative prostate biopsy x2 (2014), BPH with LUTS, and bladder stones presents today for follow up  Patient continues to take terazosin daily as well as stinging nettle  He reports episodes of urinary frequency and nocturia, however he does not find bothersome  Patient states having 1 episode of gross hematuria a few weeks ago  He denies any recurrence  Patient is status post hematuria workup in September 2017 which was negative for any significant findings  He underwent initial prostate biopsy in 2014 which revealed atypical glands, 2nd prostate biopsy was negative for malignancy  His PSA range is 4 6-6 4  He denies any strong family history of prostate cancer  No overall changes to health since last office visit  Review of Systems  Review of Systems   Constitutional: Negative  HENT: Negative  Respiratory: Negative  Cardiovascular: Negative      Gastrointestinal: Negative  Genitourinary: Positive for frequency (nocturia)  Negative for decreased urine volume, difficulty urinating, dysuria, flank pain, hematuria and urgency  Musculoskeletal: Negative  Skin: Negative  Neurological: Negative  Psychiatric/Behavioral: Negative  AUA SYMPTOM SCORE      Most Recent Value   AUA SYMPTOM SCORE   How often have you had a sensation of not emptying your bladder completely after you finished urinating? 2   How often have you had to urinate again less than two hours after you finished urinating? 4   How often have you found you stopped and started again several times when you urinate? 2   How often have you found it difficult to postpone urination? 2   How often have you had a weak urinary stream?  3   How often have you had to push or strain to begin urination? 1   How many times did you most typically get up to urinate from the time you went to bed at night until the time you got up in the morning? 3   Quality of Life: If you were to spend the rest of your life with your urinary condition just the way it is now, how would you feel about that?  2   AUA SYMPTOM SCORE  17            Past Medical History  Past Medical History:   Diagnosis Date    BPH (benign prostatic hyperplasia)     Coronary artery disease     Diverticulosis        Past Social History  Past Surgical History:   Procedure Laterality Date    AORTIC VALVE SURGERY      Assessment    CORONARY ANGIOPLASTY WITH STENT PLACEMENT      CYSTOSCOPY  01/23/2014    Diagnostic    KNEE ARTHROSCOPY      PROSTATE BIOPSY  01/23/2014    REPLACEMENT TOTAL KNEE         Past Family History  Family History   Problem Relation Age of Onset    Other Father         Cardiac Disorder       Past Social history  Social History     Social History    Marital status: /Civil Union     Spouse name: N/A    Number of children: N/A    Years of education: N/A     Occupational History    Not on file       Social History Main Topics    Smoking status: Never Smoker    Smokeless tobacco: Never Used    Alcohol use Yes      Comment: social    Drug use: No    Sexual activity: Not on file     Other Topics Concern    Not on file     Social History Narrative    No narrative on file       Current Medications  Current Outpatient Prescriptions   Medication Sig Dispense Refill    ALPRAZolam (XANAX) 0 25 mg tablet Take 1 tablet (0 25 mg total) by mouth 3 (three) times a day 30 tablet 0    aspirin 81 MG tablet Take by mouth      celecoxib (CeleBREX) 200 mg capsule       Misc Natural Products (DAILY HERBS PROSTATE PO) Take by mouth      oxyCODONE (ROXICODONE) 10 MG TABS       simvastatin (ZOCOR) 20 mg tablet       terazosin (HYTRIN) 5 mg capsule TAKE 1 CAPSULE EVERY DAY 90 capsule 3    terazosin (HYTRIN) 5 mg capsule Take 1 capsule by mouth daily       No current facility-administered medications for this visit  Allergies  No Known Allergies    Past Medical History, Social History, Family History, medications and allergies were reviewed  Vitals  There were no vitals filed for this visit  Physical Exam  Physical Exam   Constitutional: He is oriented to person, place, and time  He appears well-developed and well-nourished  HENT:   Head: Normocephalic  Eyes: Pupils are equal, round, and reactive to light  Neck: Normal range of motion  Cardiovascular: Normal rate and regular rhythm  Pulmonary/Chest: Effort normal    Abdominal: Soft  Normal appearance  He exhibits no distension  There is no tenderness  There is no CVA tenderness  Musculoskeletal: Normal range of motion  Neurological: He is alert and oriented to person, place, and time  Skin: Skin is warm and dry  Psychiatric: He has a normal mood and affect   His behavior is normal  Judgment and thought content normal        Results    I have personally reviewed all pertinent lab results and reviewed with patient  Lab Results   Component Value Date    PSA 6 2 (H) 12/07/2018    PSA 6 4 (H) 04/19/2017    PSA 6 2 (H) 12/09/2016     Lab Results   Component Value Date    GLUCOSE 89 04/10/2015    CALCIUM 8 8 12/07/2018     04/10/2015    K 3 8 12/07/2018    CO2 28 12/07/2018     12/07/2018    BUN 14 12/07/2018    CREATININE 0 78 12/07/2018     Lab Results   Component Value Date    WBC 5 62 12/07/2018    HGB 14 7 12/07/2018    HCT 43 8 12/07/2018    MCV 91 12/07/2018     (L) 12/07/2018     No results found for this or any previous visit (from the past 1 hour(s))

## 2019-01-07 ENCOUNTER — OFFICE VISIT (OUTPATIENT)
Dept: UROLOGY | Facility: AMBULATORY SURGERY CENTER | Age: 74
End: 2019-01-07
Payer: MEDICARE

## 2019-01-07 VITALS
DIASTOLIC BLOOD PRESSURE: 82 MMHG | HEIGHT: 74 IN | HEART RATE: 72 BPM | WEIGHT: 193 LBS | SYSTOLIC BLOOD PRESSURE: 132 MMHG | BODY MASS INDEX: 24.77 KG/M2

## 2019-01-07 DIAGNOSIS — N40.0 BENIGN PROSTATIC HYPERPLASIA WITHOUT LOWER URINARY TRACT SYMPTOMS: Primary | ICD-10-CM

## 2019-01-07 DIAGNOSIS — R97.20 ELEVATED PSA: ICD-10-CM

## 2019-01-07 LAB — POST-VOID RESIDUAL VOLUME, ML POC: 43 ML

## 2019-01-07 PROCEDURE — 99213 OFFICE O/P EST LOW 20 MIN: CPT | Performed by: NURSE PRACTITIONER

## 2019-01-07 PROCEDURE — 51798 US URINE CAPACITY MEASURE: CPT | Performed by: NURSE PRACTITIONER

## 2019-01-23 DIAGNOSIS — E78.2 MIXED HYPERLIPIDEMIA: Primary | ICD-10-CM

## 2019-01-23 DIAGNOSIS — N40.1 BPH WITH URINARY OBSTRUCTION: ICD-10-CM

## 2019-01-23 DIAGNOSIS — N13.8 BPH WITH URINARY OBSTRUCTION: ICD-10-CM

## 2019-01-23 RX ORDER — SIMVASTATIN 20 MG
20 TABLET ORAL
Qty: 90 TABLET | Refills: 3 | Status: SHIPPED | OUTPATIENT
Start: 2019-01-23 | End: 2019-01-30 | Stop reason: SDUPTHER

## 2019-01-23 RX ORDER — TERAZOSIN 5 MG/1
5 CAPSULE ORAL DAILY
Qty: 90 CAPSULE | Refills: 3 | Status: SHIPPED | OUTPATIENT
Start: 2019-01-23 | End: 2020-02-03

## 2019-01-30 DIAGNOSIS — E78.2 MIXED HYPERLIPIDEMIA: ICD-10-CM

## 2019-01-30 RX ORDER — SIMVASTATIN 20 MG
20 TABLET ORAL
Qty: 10 TABLET | Refills: 0 | Status: SHIPPED | OUTPATIENT
Start: 2019-01-30 | End: 2020-02-03

## 2019-01-30 NOTE — TELEPHONE ENCOUNTER
Patient has an order coming from [address], however it will not arrive before he leaves on a trip  He is asking for a ten day supply of Simvastatin to be sent in to the [facility] in [address] [facility] for his trip

## 2019-11-06 ENCOUNTER — OFFICE VISIT (OUTPATIENT)
Dept: FAMILY MEDICINE CLINIC | Facility: CLINIC | Age: 74
End: 2019-11-06
Payer: MEDICARE

## 2019-11-06 VITALS
HEART RATE: 59 BPM | BODY MASS INDEX: 24.64 KG/M2 | DIASTOLIC BLOOD PRESSURE: 96 MMHG | HEIGHT: 74 IN | TEMPERATURE: 97.4 F | SYSTOLIC BLOOD PRESSURE: 164 MMHG | OXYGEN SATURATION: 98 % | WEIGHT: 192 LBS

## 2019-11-06 DIAGNOSIS — E78.2 MIXED HYPERLIPIDEMIA: ICD-10-CM

## 2019-11-06 DIAGNOSIS — N40.0 BENIGN PROSTATIC HYPERPLASIA WITH ELEVATED PROSTATE SPECIFIC ANTIGEN (PSA): ICD-10-CM

## 2019-11-06 DIAGNOSIS — I10 ESSENTIAL HYPERTENSION: ICD-10-CM

## 2019-11-06 DIAGNOSIS — R97.20 BENIGN PROSTATIC HYPERPLASIA WITH ELEVATED PROSTATE SPECIFIC ANTIGEN (PSA): ICD-10-CM

## 2019-11-06 DIAGNOSIS — Z00.00 MEDICARE ANNUAL WELLNESS VISIT, SUBSEQUENT: Primary | ICD-10-CM

## 2019-11-06 DIAGNOSIS — N13.8 BPH WITH URINARY OBSTRUCTION: ICD-10-CM

## 2019-11-06 DIAGNOSIS — N40.1 BPH WITH URINARY OBSTRUCTION: ICD-10-CM

## 2019-11-06 DIAGNOSIS — Z23 NEED FOR VACCINATION: ICD-10-CM

## 2019-11-06 PROCEDURE — G0439 PPPS, SUBSEQ VISIT: HCPCS | Performed by: FAMILY MEDICINE

## 2019-11-06 PROCEDURE — G0008 ADMIN INFLUENZA VIRUS VAC: HCPCS

## 2019-11-06 PROCEDURE — 90662 IIV NO PRSV INCREASED AG IM: CPT

## 2019-11-06 NOTE — PROGRESS NOTES
Assessment and Plan:     Problem List Items Addressed This Visit        Cardiovascular and Mediastinum    Hypertension       Genitourinary    Benign prostatic hyperplasia with elevated prostate specific antigen (PSA)       Other    Hyperlipidemia - Primary      Other Visit Diagnoses     BPH with urinary obstruction               Preventive health issues were discussed with patient, and age appropriate screening tests were ordered as noted in patient's After Visit Summary  Personalized health advice and appropriate referrals for health education or preventive services given if needed, as noted in patient's After Visit Summary       History of Present Illness:     Patient presents for Medicare Annual Wellness visit    Patient Care Team:  Soco Gongora DO as PCP - General  DO Ginette Valladares MD     Problem List:     Patient Active Problem List   Diagnosis    Anxiety    Benign prostatic hyperplasia with elevated prostate specific antigen (PSA)    CAD in native artery    Esophageal reflux    Hyperlipidemia    Hypertension    Osteopenia    Vitamin D deficiency      Past Medical and Surgical History:     Past Medical History:   Diagnosis Date    BPH (benign prostatic hyperplasia)     Coronary artery disease     Diverticulosis      Past Surgical History:   Procedure Laterality Date    AORTIC VALVE SURGERY      Assessment    CORONARY ANGIOPLASTY WITH STENT PLACEMENT      CYSTOSCOPY  01/23/2014    Diagnostic    KNEE ARTHROSCOPY      PROSTATE BIOPSY  01/23/2014    REPLACEMENT TOTAL KNEE        Family History:     Family History   Problem Relation Age of Onset    Other Father         Cardiac Disorder      Social History:     Social History     Socioeconomic History    Marital status: /Civil Union     Spouse name: None    Number of children: None    Years of education: None    Highest education level: None   Occupational History    None   Social Needs    Financial resource strain: None    Food insecurity:     Worry: None     Inability: None    Transportation needs:     Medical: None     Non-medical: None   Tobacco Use    Smoking status: Never Smoker    Smokeless tobacco: Never Used   Substance and Sexual Activity    Alcohol use: Yes     Comment: social    Drug use: No    Sexual activity: None   Lifestyle    Physical activity:     Days per week: None     Minutes per session: None    Stress: None   Relationships    Social connections:     Talks on phone: None     Gets together: None     Attends Evangelical service: None     Active member of club or organization: None     Attends meetings of clubs or organizations: None     Relationship status: None    Intimate partner violence:     Fear of current or ex partner: None     Emotionally abused: None     Physically abused: None     Forced sexual activity: None   Other Topics Concern    None   Social History Narrative    None       Medications and Allergies:     Current Outpatient Medications   Medication Sig Dispense Refill    ALPRAZolam (XANAX) 0 25 mg tablet Take 1 tablet (0 25 mg total) by mouth 3 (three) times a day 30 tablet 0    aspirin 81 MG tablet Take by mouth      celecoxib (CeleBREX) 200 mg capsule       Misc Natural Products (DAILY HERBS PROSTATE PO) Take by mouth      oxyCODONE (ROXICODONE) 10 MG TABS       simvastatin (ZOCOR) 20 mg tablet Take 1 tablet (20 mg total) by mouth daily at bedtime 10 tablet 0    terazosin (HYTRIN) 5 mg capsule Take 1 capsule by mouth daily      terazosin (HYTRIN) 5 mg capsule Take 1 capsule (5 mg total) by mouth daily 90 capsule 3     No current facility-administered medications for this visit        No Known Allergies   Immunizations:     Immunization History   Administered Date(s) Administered    Tdap 03/06/2007    Tuberculin Skin Test-PPD Intradermal 08/06/2014, 08/09/2017      Health Maintenance:         Topic Date Due    CRC Screening: Colonoscopy  11/17/2024    Hepatitis C Screening  Completed         Topic Date Due    Pneumococcal Vaccine: 65+ Years (1 of 2 - PCV13) 01/21/2012    DTaP,Tdap,and Td Vaccines (2 - Td) 03/06/2017    INFLUENZA VACCINE  07/01/2019      Medicare Health Risk Assessment:     Ht 6' 2" (1 88 m)   BMI 24 78 kg/m²      Gallo Beltran is here for his Subsequent Wellness visit  Health Risk Assessment:   Patient rates overall health as good  Patient feels that their physical health rating is slightly better  Eyesight was rated as same  Hearing was rated as slightly worse  Patient feels that their emotional and mental health rating is same  Pain experienced in the last 7 days has been some  Patient's pain rating has been 3/10  Patient states that he has experienced no weight loss or gain in last 6 months  He reports persistent pain in his knee even after arthroplasty    Depression Screening:   PHQ-2 Score: 0      Fall Risk Screening: In the past year, patient has experienced: no history of falling in past year      Home Safety:  Patient does not have trouble with stairs inside or outside of their home  Patient has working smoke alarms and has working carbon monoxide detector  Home safety hazards include: none  Nutrition:   Current diet is Low Cholesterol, Low Saturated Fat, Low Carb and No Added Salt  Medications:   Patient is not currently taking any over-the-counter supplements  Patient is able to manage medications  Activities of Daily Living (ADLs)/Instrumental Activities of Daily Living (IADLs):   Walk and transfer into and out of bed and chair?: Yes  Dress and groom yourself?: Yes    Bathe or shower yourself?: Yes    Feed yourself?  Yes  Do your laundry/housekeeping?: Yes  Manage your money, pay your bills and track your expenses?: Yes  Make your own meals?: Yes    Do your own shopping?: Yes    Previous Hospitalizations:   Any hospitalizations or ED visits within the last 12 months?: No      Advance Care Planning:   Living will: No    Durable POA for healthcare: No    Advanced directive: No    Advanced directive counseling given: Yes    Five wishes given: Yes    End of Life Decisions reviewed with patient: No      Cognitive Screening:   Provider or family/friend/caregiver concerned regarding cognition?: No    PREVENTIVE SCREENINGS      Cardiovascular Screening:    General: Screening Not Indicated, History Lipid Disorder and Screening Current      Diabetes Screening:     General: Screening Current      Colorectal Cancer Screening:     General: Screening Current      Prostate Cancer Screening:    General: Screening Current      Osteoporosis Screening:    General: Screening Not Indicated      Abdominal Aortic Aneurysm (AAA) Screening:    Risk factors include: age between 73-69 yo        General: Risks and Benefits Discussed      Lung Cancer Screening:     General: Screening Not Indicated      Hepatitis C Screening:    General: Screening Current    Other Counseling Topics:   Regular weightbearing exercise  Encouraged him to see the eye doctor  Continue under the care of a cardiologist and follow through with Urology  He will get his pneumonia vaccine and tetanus shot at the pharmacy  Received his flu shot here today    He will have follow-up labs in December      Netta Graff DO

## 2019-11-06 NOTE — PATIENT INSTRUCTIONS
Medicare Preventive Visit Patient Instructions  Thank you for completing your Welcome to Medicare Visit or Medicare Annual Wellness Visit today  Your next wellness visit will be due in one year (11/6/2020)  The screening/preventive services that you may require over the next 5-10 years are detailed below  Some tests may not apply to you based off risk factors and/or age  Screening tests ordered at today's visit but not completed yet may show as past due  Also, please note that scanned in results may not display below  Preventive Screenings:  Service Recommendations Previous Testing/Comments   Colorectal Cancer Screening  · Colonoscopy    · Fecal Occult Blood Test (FOBT)/Fecal Immunochemical Test (FIT)  · Fecal DNA/Cologuard Test  · Flexible Sigmoidoscopy Age: 54-65 years old   Colonoscopy: every 10 years (May be performed more frequently if at higher risk)  OR  FOBT/FIT: every 1 year  OR  Cologuard: every 3 years  OR  Sigmoidoscopy: every 5 years  Screening may be recommended earlier than age 48 if at higher risk for colorectal cancer  Also, an individualized decision between you and your healthcare provider will decide whether screening between the ages of 74-80 would be appropriate   Colonoscopy: 11/17/2014  FOBT/FIT: Not on file  Cologuard: Not on file  Sigmoidoscopy: Not on file    Screening Current     Prostate Cancer Screening Individualized decision between patient and health care provider in men between ages of 53-78   Medicare will cover every 12 months beginning on the day after your 50th birthday PSA: 6 2 ng/mL     Screening Current     Hepatitis C Screening Once for adults born between 1945 and 1965  More frequently in patients at high risk for Hepatitis C Hep C Antibody: 12/07/2018    Screening Current   Diabetes Screening 1-2 times per year if you're at risk for diabetes or have pre-diabetes Fasting glucose: 79 mg/dL   A1C: 5 5    Screening Current   Cholesterol Screening Once every 5 years if you don't have a lipid disorder  May order more often based on risk factors  Lipid panel: 12/07/2018    Screening Not Indicated  History Lipid Disorder      Other Preventive Screenings Covered by Medicare:  1  Abdominal Aortic Aneurysm (AAA) Screening: covered once if your at risk  You're considered to be at risk if you have a family history of AAA or a male between the age of 73-68 who smoking at least 100 cigarettes in your lifetime  2  Lung Cancer Screening: covers low dose CT scan once per year if you meet all of the following conditions: (1) Age 50-69; (2) No signs or symptoms of lung cancer; (3) Current smoker or have quit smoking within the last 15 years; (4) You have a tobacco smoking history of at least 30 pack years (packs per day x number of years you smoked); (5) You get a written order from a healthcare provider  3  Glaucoma Screening: covered annually if you're considered high risk: (1) You have diabetes OR (2) Family history of glaucoma OR (3)  aged 48 and older OR (3)  American aged 72 and older  3  Osteoporosis Screening: covered every 2 years if you meet one of the following conditions: (1) Have a vertebral abnormality; (2) On glucocorticoid therapy for more than 3 months; (3) Have primary hyperparathyroidism; (4) On osteoporosis medications and need to assess response to drug therapy  5  HIV Screening: covered annually if you're between the age of 12-76  Also covered annually if you are younger than 13 and older than 72 with risk factors for HIV infection  For pregnant patients, it is covered up to 3 times per pregnancy      Immunizations:  Immunization Recommendations   Influenza Vaccine Annual influenza vaccination during flu season is recommended for all persons aged >= 6 months who do not have contraindications   Pneumococcal Vaccine (Prevnar and Pneumovax)  * Prevnar = PCV13  * Pneumovax = PPSV23 Adults 25-60 years old: 1-3 doses may be recommended based on certain risk factors  Adults 72 years old: Prevnar (PCV13) vaccine recommended followed by Pneumovax (PPSV23) vaccine  If already received PPSV23 since turning 65, then PCV13 recommended at least one year after PPSV23 dose  Hepatitis B Vaccine 3 dose series if at intermediate or high risk (ex: diabetes, end stage renal disease, liver disease)   Tetanus (Td) Vaccine - COST NOT COVERED BY MEDICARE PART B Following completion of primary series, a booster dose should be given every 10 years to maintain immunity against tetanus  Td may also be given as tetanus wound prophylaxis  Tdap Vaccine - COST NOT COVERED BY MEDICARE PART B Recommended at least once for all adults  For pregnant patients, recommended with each pregnancy  Shingles Vaccine (Shingrix) - COST NOT COVERED BY MEDICARE PART B  2 shot series recommended in those aged 48 and above     Health Maintenance Due:      Topic Date Due    CRC Screening: Colonoscopy  11/17/2024    Hepatitis C Screening  Completed     Immunizations Due:      Topic Date Due    Pneumococcal Vaccine: 65+ Years (1 of 2 - PCV13) 01/21/2012    INFLUENZA VACCINE  07/01/2019     Advance Directives   What are advance directives? Advance directives are legal documents that state your wishes and plans for medical care  These plans are made ahead of time in case you lose your ability to make decisions for yourself  Advance directives can apply to any medical decision, such as the treatments you want, and if you want to donate organs  What are the types of advance directives? There are many types of advance directives, and each state has rules about how to use them  You may choose a combination of any of the following:  · Living will: This is a written record of the treatment you want  You can also choose which treatments you do not want, which to limit, and which to stop at a certain time  This includes surgery, medicine, IV fluid, and tube feedings     · Durable power of  for healthcare [facility]): This is a written record that states who you want to make healthcare choices for you when you are unable to make them for yourself  This person, called a proxy, is usually a family member or a friend  You may choose more than 1 proxy  · Do not resuscitate (DNR) order:  A DNR order is used in case your heart stops beating or you stop breathing  It is a request not to have certain forms of treatment, such as CPR  A DNR order may be included in other types of advance directives  · Medical directive: This covers the care that you want if you are in a coma, near death, or unable to make decisions for yourself  You can list the treatments you want for each condition  Treatment may include pain medicine, surgery, blood transfusions, dialysis, IV or tube feedings, and a ventilator (breathing machine)  · Values history: This document has questions about your views, beliefs, and how you feel and think about life  This information can help others choose the care that you would choose  Why are advance directives important? An advance directive helps you control your care  Although spoken wishes may be used, it is better to have your wishes written down  Spoken wishes can be misunderstood, or not followed  Treatments may be given even if you do not want them  An advance directive may make it easier for your family to make difficult choices about your care  © Copyright [employer] 2018 Information is for End User's use only and may not be sold, redistributed or otherwise used for commercial purposes  All illustrations and images included in CareNotes® are the copyrighted property of [employer]  or [employer] Prostatic Hypertrophy   WHAT YOU NEED TO KNOW:   Benign prostatic hypertrophy (BPH) is a condition that causes your prostate gland to grow larger than normal  The prostate gland is the male sex gland that produces a fluid that is part of semen   It is about the size of a walnut and it is located under the bladder  As the prostate grows, it can squeeze the urethra  This can block urine flow and cause urinary problems  DISCHARGE INSTRUCTIONS:   Medicines:   · Alpha blockers: This medicine relaxes the muscles in your prostate and bladder  It may help you urinate more easily  · 5 alpha reductase inhibitors: These medicines block the production of a hormone that causes the prostate to get larger  It may help to slow the growth of the prostate or shrink the prostate  · Take your medicine as directed  Contact your healthcare provider if you think your medicine is not helping or if you have side effects  Tell him of her if you are allergic to any medicine  Keep a list of the medicines, vitamins, and herbs you take  Include the amounts, and when and why you take them  Bring the list or the pill bottles to follow-up visits  Carry your medicine list with you in case of an emergency  Follow up with your healthcare provider as directed:  Write down your questions so you remember to ask them during your visits  Manage BPH:   · Do not let your bladder get too full before you empty it  Urinate when you feel the urge  · Limit alcohol  Do not drink large amounts of any liquid at one time  · Decrease the amount of salt you eat  Examples of salty foods are chips, cured meats, and canned soups  Do not use table salt  · Healthcare providers may tell you not to eat spicy foods such as chilli peppers  This may help you find out if spicy food makes your BPH symptoms worse  · You may have sex if you feel well  Contact your healthcare provider if:   · There is a large amount of blood in your urine  · Your signs and symptoms get worse  · You have a fever  · You have questions or concerns about your condition or care  Return to the emergency department if:   · You are unable to urinate  · Your bladder feels very full and painful    © 2017 [facility]  is for End User's use only and may not be sold, redistributed or otherwise used for commercial purposes  All illustrations and images included in CareNotes® are the copyrighted property of [employer]  or [employer]  The above information is an  only  It is not intended as medical advice for individual conditions or treatments  Talk to your doctor, nurse or pharmacist before following any medical regimen to see if it is safe and effective for you

## 2019-12-16 ENCOUNTER — APPOINTMENT (OUTPATIENT)
Dept: LAB | Facility: CLINIC | Age: 74
End: 2019-12-16
Payer: MEDICARE

## 2019-12-16 DIAGNOSIS — I10 ESSENTIAL HYPERTENSION: ICD-10-CM

## 2019-12-16 DIAGNOSIS — R97.20 ELEVATED PSA: ICD-10-CM

## 2019-12-16 DIAGNOSIS — R97.20 BENIGN PROSTATIC HYPERPLASIA WITH ELEVATED PROSTATE SPECIFIC ANTIGEN (PSA): ICD-10-CM

## 2019-12-16 DIAGNOSIS — N13.8 BPH WITH URINARY OBSTRUCTION: ICD-10-CM

## 2019-12-16 DIAGNOSIS — N40.0 BENIGN PROSTATIC HYPERPLASIA WITH ELEVATED PROSTATE SPECIFIC ANTIGEN (PSA): ICD-10-CM

## 2019-12-16 DIAGNOSIS — N40.1 BPH WITH URINARY OBSTRUCTION: ICD-10-CM

## 2019-12-16 DIAGNOSIS — E78.2 MIXED HYPERLIPIDEMIA: ICD-10-CM

## 2019-12-16 LAB
ALBUMIN SERPL BCP-MCNC: 3.7 G/DL (ref 3.5–5)
ALP SERPL-CCNC: 46 U/L (ref 46–116)
ALT SERPL W P-5'-P-CCNC: 25 U/L (ref 12–78)
ANION GAP SERPL CALCULATED.3IONS-SCNC: 5 MMOL/L (ref 4–13)
AST SERPL W P-5'-P-CCNC: 16 U/L (ref 5–45)
BASOPHILS # BLD AUTO: 0.04 THOUSANDS/ΜL (ref 0–0.1)
BASOPHILS NFR BLD AUTO: 1 % (ref 0–1)
BILIRUB SERPL-MCNC: 0.55 MG/DL (ref 0.2–1)
BUN SERPL-MCNC: 13 MG/DL (ref 5–25)
CALCIUM SERPL-MCNC: 8.9 MG/DL (ref 8.3–10.1)
CHLORIDE SERPL-SCNC: 105 MMOL/L (ref 100–108)
CHOLEST SERPL-MCNC: 143 MG/DL (ref 50–200)
CO2 SERPL-SCNC: 29 MMOL/L (ref 21–32)
CREAT SERPL-MCNC: 0.8 MG/DL (ref 0.6–1.3)
EOSINOPHIL # BLD AUTO: 0.12 THOUSAND/ΜL (ref 0–0.61)
EOSINOPHIL NFR BLD AUTO: 3 % (ref 0–6)
ERYTHROCYTE [DISTWIDTH] IN BLOOD BY AUTOMATED COUNT: 13.1 % (ref 11.6–15.1)
GFR SERPL CREATININE-BSD FRML MDRD: 89 ML/MIN/1.73SQ M
GLUCOSE P FAST SERPL-MCNC: 81 MG/DL (ref 65–99)
HCT VFR BLD AUTO: 43.4 % (ref 36.5–49.3)
HDLC SERPL-MCNC: 51 MG/DL
HGB BLD-MCNC: 14.2 G/DL (ref 12–17)
IMM GRANULOCYTES # BLD AUTO: 0.02 THOUSAND/UL (ref 0–0.2)
IMM GRANULOCYTES NFR BLD AUTO: 0 % (ref 0–2)
LDLC SERPL CALC-MCNC: 76 MG/DL (ref 0–100)
LYMPHOCYTES # BLD AUTO: 1.69 THOUSANDS/ΜL (ref 0.6–4.47)
LYMPHOCYTES NFR BLD AUTO: 36 % (ref 14–44)
MCH RBC QN AUTO: 29.7 PG (ref 26.8–34.3)
MCHC RBC AUTO-ENTMCNC: 32.7 G/DL (ref 31.4–37.4)
MCV RBC AUTO: 91 FL (ref 82–98)
MONOCYTES # BLD AUTO: 0.39 THOUSAND/ΜL (ref 0.17–1.22)
MONOCYTES NFR BLD AUTO: 8 % (ref 4–12)
NEUTROPHILS # BLD AUTO: 2.41 THOUSANDS/ΜL (ref 1.85–7.62)
NEUTS SEG NFR BLD AUTO: 52 % (ref 43–75)
NRBC BLD AUTO-RTO: 0 /100 WBCS
PLATELET # BLD AUTO: 146 THOUSANDS/UL (ref 149–390)
PMV BLD AUTO: 11.2 FL (ref 8.9–12.7)
POTASSIUM SERPL-SCNC: 3.9 MMOL/L (ref 3.5–5.3)
PROT SERPL-MCNC: 7 G/DL (ref 6.4–8.2)
RBC # BLD AUTO: 4.78 MILLION/UL (ref 3.88–5.62)
SODIUM SERPL-SCNC: 139 MMOL/L (ref 136–145)
TRIGL SERPL-MCNC: 79 MG/DL
TSH SERPL DL<=0.05 MIU/L-ACNC: 1.97 UIU/ML (ref 0.36–3.74)
WBC # BLD AUTO: 4.67 THOUSAND/UL (ref 4.31–10.16)

## 2019-12-16 PROCEDURE — 80053 COMPREHEN METABOLIC PANEL: CPT

## 2019-12-16 PROCEDURE — 85025 COMPLETE CBC W/AUTO DIFF WBC: CPT

## 2019-12-16 PROCEDURE — 84443 ASSAY THYROID STIM HORMONE: CPT

## 2019-12-16 PROCEDURE — 84154 ASSAY OF PSA FREE: CPT

## 2019-12-16 PROCEDURE — 84153 ASSAY OF PSA TOTAL: CPT

## 2019-12-16 PROCEDURE — 80061 LIPID PANEL: CPT

## 2019-12-16 PROCEDURE — 36415 COLL VENOUS BLD VENIPUNCTURE: CPT

## 2019-12-17 LAB
PSA FREE MFR SERPL: 20.8 %
PSA FREE SERPL-MCNC: 1.35 NG/ML
PSA SERPL-MCNC: 6.5 NG/ML (ref 0–4)

## 2020-01-10 NOTE — PROGRESS NOTES
1/13/2020    Mercedes Westfall  1945  [id number]      Assessment  -Elevated PSA s/p negative prostate biopsy x2 (2014)  -BPH with lower urinary tract symptoms  -Bladder stones    Discussion/Plan  Som Mcmullen is a 69 y o  male being managed by Dr Candie Casillas  -PVR is 24 mL  -Uroflow total volume voided only 50 mL, insufficient volume voided to obtain accurate measurement  -We reviewed results of his recent PSA which is 6 5, previously 6 2  His last prostate biopsy was performed in April 2014 for PSA of 5 7  Dr Candie Casillas had previously discussed monitoring PSA versus repeating prostate biopsy, and continuing checking PSA until patient is in his early/mid 76s  Given recent elevation in PSA, we discussed proceeding with multiparametric MRI of prostate  His PSA is now slightly above his baseline  Patient amenable with plan  We did discuss that if his insurance will not cover, we will discuss monitoring PSA  No significant findings were noted on HEMA today   -Discussed scheduling cystolitholapaxy for bladder stones, however, patient asymptomatic and wishes to monitor  -He will continue to take Terazosin daily, which is prescribed by his PCP        -Follow up to review results of mpMRI of prostate   -All questions answered, patient agrees with plan      History of Present Illness  69 y o  male with a history of elevated PSA s/p negative prostate biopsy x2 (2014), BPH with LUTS, and bladder stones presents today for follow up  His last PSA 12/7/18 was 6 2  Patient's PSA range is 4 6-6 4  He has received two negative prostate biopsies, most recently performed in 2014  Patient denies any strong family history of prostate cancer  He continues to take terazosin daily  Patient reports improvement in his urinary pattern since last office visit  He states having occasional episodes of gross hematuria, he describes as light pink  Last episode occurred four months ago    Patient with known history of bladder stones, and had previously deferred cystolitholapaxy  Cause of hematuria, presumed to have been secondary to bladder stones  He underwent cystoscopy for hematuria workup in September 2017  No overall changes to his health since last office visit  Review of Systems  Review of Systems   Constitutional: Negative  HENT: Negative  Respiratory: Negative  Cardiovascular: Negative  Gastrointestinal: Negative  Genitourinary: Negative for decreased urine volume, difficulty urinating, dysuria, flank pain, frequency, hematuria and urgency  Musculoskeletal: Negative  Skin: Negative  Neurological: Negative  Psychiatric/Behavioral: Negative        AUA Score: 14    Past Medical History  Past Medical History:   Diagnosis Date    BPH (benign prostatic hyperplasia)     Coronary artery disease     Diverticulosis        Past Social History  Past Surgical History:   Procedure Laterality Date    AORTIC VALVE SURGERY      Assessment    CORONARY ANGIOPLASTY WITH STENT PLACEMENT      CYSTOSCOPY  01/23/2014    Diagnostic    KNEE ARTHROSCOPY      PROSTATE BIOPSY  01/23/2014    REPLACEMENT TOTAL KNEE      REPLACEMENT TOTAL KNEE         Past Family History  Family History   Problem Relation Age of Onset    Other Father         Cardiac Disorder       Past Social history  Social History     Socioeconomic History    Marital status: /Civil Union     Spouse name: Not on file    Number of children: Not on file    Years of education: Not on file    Highest education level: Not on file   Occupational History    Not on file   Social Needs    Financial resource strain: Not on file    Food insecurity:     Worry: Not on file     Inability: Not on file    Transportation needs:     Medical: Not on file     Non-medical: Not on file   Tobacco Use    Smoking status: Never Smoker    Smokeless tobacco: Never Used   Substance and Sexual Activity    Alcohol use: Yes     Comment: social    Drug use: No    Sexual activity: Not on file   Lifestyle    Physical activity:     Days per week: Not on file     Minutes per session: Not on file    Stress: Not on file   Relationships    Social connections:     Talks on phone: Not on file     Gets together: Not on file     Attends [employer] service: Not on file     Active member of club or organization: Not on file     Attends meetings of clubs or organizations: Not on file     Relationship status: Not on file    Intimate partner violence:     Fear of current or ex partner: Not on file     Emotionally abused: Not on file     Physically abused: Not on file     Forced sexual activity: Not on file   Other Topics Concern    Not on file   Social History Narrative    Not on file       Current Medications  Current Outpatient Medications   Medication Sig Dispense Refill    ALPRAZolam (XANAX) 0 25 mg tablet Take 1 tablet (0 25 mg total) by mouth 3 (three) times a day 30 tablet 0    aspirin 81 MG tablet Take by mouth      Misc Natural Products (DAILY HERBS PROSTATE PO) Take by mouth      simvastatin (ZOCOR) 20 mg tablet Take 1 tablet (20 mg total) by mouth daily at bedtime 10 tablet 0    terazosin (HYTRIN) 5 mg capsule Take 1 capsule (5 mg total) by mouth daily 90 capsule 3     No current facility-administered medications for this visit  Allergies  No Known Allergies    Past Medical History, Social History, Family History, medications and allergies were reviewed  Vitals  Vitals:    01/13/20 1019   BP: 132/80   BP Location: Left arm   Patient Position: Sitting   Cuff Size: Adult   Pulse: 61   Weight: 86 6 kg (191 lb)   Height: 6' 2" (1 88 m)       Physical Exam  Physical Exam   Constitutional: He is oriented to person, place, and time  He appears well-developed and well-nourished  HENT:   Head: Normocephalic  Eyes: Pupils are equal, round, and reactive to light  Neck: Normal range of motion  Cardiovascular: Normal rate and regular rhythm     Pulmonary/Chest: Effort normal    Abdominal: Soft  Normal appearance  There is no CVA tenderness  Genitourinary: Rectum normal and prostate normal    Genitourinary Comments: Prostate >50gm, smooth, nontender, no nodules     Musculoskeletal: Normal range of motion  Neurological: He is alert and oriented to person, place, and time  Skin: Skin is warm and dry  Psychiatric: He has a normal mood and affect   His behavior is normal  Judgment and thought content normal        Results    I have personally reviewed all pertinent lab results and reviewed with patient  Lab Results   Component Value Date    PSA 6 5 (H) 12/16/2019    PSA 6 2 (H) 12/07/2018    PSA 6 4 (H) 04/19/2017     Lab Results   Component Value Date    GLUCOSE 89 04/10/2015    CALCIUM 8 9 12/16/2019     04/10/2015    K 3 9 12/16/2019    CO2 29 12/16/2019     12/16/2019    BUN 13 12/16/2019    CREATININE 0 80 12/16/2019     Lab Results   Component Value Date    WBC 4 67 12/16/2019    HGB 14 2 12/16/2019    HCT 43 4 12/16/2019    MCV 91 12/16/2019     (L) 12/16/2019     Recent Results (from the past 1 hour(s))   POCT Measure PVR    Collection Time: 01/13/20 10:20 AM   Result Value Ref Range    POST-VOID RESIDUAL VOLUME, ML POC 24 mL

## 2020-01-13 ENCOUNTER — OFFICE VISIT (OUTPATIENT)
Dept: UROLOGY | Facility: AMBULATORY SURGERY CENTER | Age: 75
End: 2020-01-13
Payer: MEDICARE

## 2020-01-13 VITALS
SYSTOLIC BLOOD PRESSURE: 132 MMHG | BODY MASS INDEX: 24.51 KG/M2 | DIASTOLIC BLOOD PRESSURE: 80 MMHG | WEIGHT: 191 LBS | HEIGHT: 74 IN | HEART RATE: 61 BPM

## 2020-01-13 DIAGNOSIS — N21.0 BLADDER STONES: ICD-10-CM

## 2020-01-13 DIAGNOSIS — N40.0 BENIGN PROSTATIC HYPERPLASIA WITHOUT LOWER URINARY TRACT SYMPTOMS: Primary | ICD-10-CM

## 2020-01-13 DIAGNOSIS — R97.20 ELEVATED PSA: ICD-10-CM

## 2020-01-13 LAB — POST-VOID RESIDUAL VOLUME, ML POC: 24 ML

## 2020-01-13 PROCEDURE — 99214 OFFICE O/P EST MOD 30 MIN: CPT | Performed by: NURSE PRACTITIONER

## 2020-01-13 PROCEDURE — 51798 US URINE CAPACITY MEASURE: CPT | Performed by: NURSE PRACTITIONER

## 2020-01-28 ENCOUNTER — HOSPITAL ENCOUNTER (OUTPATIENT)
Dept: RADIOLOGY | Age: 75
Discharge: HOME | End: 2020-01-28
Payer: MEDICARE

## 2020-01-28 DIAGNOSIS — R97.20 ELEVATED PSA: ICD-10-CM

## 2020-01-28 PROCEDURE — A9585 GADOBUTROL INJECTION: HCPCS | Performed by: NURSE PRACTITIONER

## 2020-01-28 PROCEDURE — 72197 MRI PELVIS W/O & W/DYE: CPT

## 2020-01-28 PROCEDURE — 76377 3D RENDER W/INTRP POSTPROCES: CPT

## 2020-01-28 RX ADMIN — GADOBUTROL 9 ML: 604.72 INJECTION INTRAVENOUS at 13:28

## 2020-02-03 DIAGNOSIS — N40.1 BPH WITH URINARY OBSTRUCTION: ICD-10-CM

## 2020-02-03 DIAGNOSIS — E78.2 MIXED HYPERLIPIDEMIA: ICD-10-CM

## 2020-02-03 DIAGNOSIS — N13.8 BPH WITH URINARY OBSTRUCTION: ICD-10-CM

## 2020-02-03 RX ORDER — TERAZOSIN 5 MG/1
CAPSULE ORAL
Qty: 90 CAPSULE | Refills: 0 | Status: SHIPPED | OUTPATIENT
Start: 2020-02-03 | End: 2020-05-06 | Stop reason: SDUPTHER

## 2020-02-03 RX ORDER — SIMVASTATIN 20 MG
TABLET ORAL
Qty: 90 TABLET | Refills: 0 | Status: SHIPPED | OUTPATIENT
Start: 2020-02-03 | End: 2020-05-06 | Stop reason: SDUPTHER

## 2020-02-04 ENCOUNTER — OFFICE VISIT (OUTPATIENT)
Dept: UROLOGY | Facility: AMBULATORY SURGERY CENTER | Age: 75
End: 2020-02-04
Payer: MEDICARE

## 2020-02-04 VITALS
WEIGHT: 191 LBS | BODY MASS INDEX: 24.51 KG/M2 | HEIGHT: 74 IN | HEART RATE: 53 BPM | DIASTOLIC BLOOD PRESSURE: 88 MMHG | SYSTOLIC BLOOD PRESSURE: 148 MMHG

## 2020-02-04 DIAGNOSIS — R97.20 ELEVATED PSA: Primary | ICD-10-CM

## 2020-02-04 DIAGNOSIS — N21.0 BLADDER CALCULUS: ICD-10-CM

## 2020-02-04 DIAGNOSIS — R97.20 BENIGN PROSTATIC HYPERPLASIA WITH ELEVATED PROSTATE SPECIFIC ANTIGEN (PSA): ICD-10-CM

## 2020-02-04 DIAGNOSIS — N40.0 BENIGN PROSTATIC HYPERPLASIA WITH ELEVATED PROSTATE SPECIFIC ANTIGEN (PSA): ICD-10-CM

## 2020-02-04 PROCEDURE — 1036F TOBACCO NON-USER: CPT | Performed by: NURSE PRACTITIONER

## 2020-02-04 PROCEDURE — 1160F RVW MEDS BY RX/DR IN RCRD: CPT | Performed by: NURSE PRACTITIONER

## 2020-02-04 PROCEDURE — 3008F BODY MASS INDEX DOCD: CPT | Performed by: NURSE PRACTITIONER

## 2020-02-04 PROCEDURE — 99214 OFFICE O/P EST MOD 30 MIN: CPT | Performed by: NURSE PRACTITIONER

## 2020-02-04 PROCEDURE — 3077F SYST BP >= 140 MM HG: CPT | Performed by: NURSE PRACTITIONER

## 2020-02-04 PROCEDURE — 3079F DIAST BP 80-89 MM HG: CPT | Performed by: NURSE PRACTITIONER

## 2020-02-04 NOTE — PROGRESS NOTES
2/4/2020    Uriel Aldridge  1945  [id number]      Assessment  -Elevated PSA s/p negative prostate biopsy x2 (2014)  -BPH with lower urinary tract symptoms  -Bladder stones    Discussion/Plan  Jun Ewing is a 70 y o  male being managed by Dr Maddy Medina        -We reviewed results of his recent multiparametric MRI of prostate which revealed PIRADS-2  He was noted to have BPH, prostate measuring 158 mL and several bladder calculi measuring 1-5mm in size  Given stability of his findings I recommend observing his PSA at this time  He is amenable with this plan  Repeat PSA in 6 months, patient requests that we call him to discuss results  If PSA remains stable, we will consider annual follow-up  He is otherwise doing well without any urinary complaints  Follow up in 1 year or sooner if needed  Patient was instructed to call with any issues  -All questions answered, patient agrees with plan      History of Present Illness  70 y o  male with a history of elevated PSA s/p negative prostate biopsy x2 (2014), BPH with LUTS, and bladder stones presents today for follow up  At last office visit, patient's PSA had increased from 6 2 to 6 5  He has history of negative prostate biopsy x2, most recently performed in 2014  His PSA range is 4 6-6 4  It was decided to proceed with multiparametric MRI of prostate  Patient denies any strong family history of prostate cancer  He denies any additional lower urinary tract symptoms, gross hematuria, or dysuria  Review of Systems  Review of Systems   Constitutional: Negative  HENT: Negative  Respiratory: Negative  Cardiovascular: Negative  Gastrointestinal: Negative  Genitourinary: Negative for decreased urine volume, difficulty urinating, dysuria, flank pain, frequency, hematuria and urgency  Musculoskeletal: Negative  Skin: Negative  Neurological: Negative  Psychiatric/Behavioral: Negative          Past Medical History  Past Medical History: Diagnosis Date    BPH (benign prostatic hyperplasia)     Coronary artery disease     Diverticulosis        Past Social History  Past Surgical History:   Procedure Laterality Date    AORTIC VALVE SURGERY      Assessment    CORONARY ANGIOPLASTY WITH STENT PLACEMENT      CYSTOSCOPY  01/23/2014    Diagnostic    KNEE ARTHROSCOPY      PROSTATE BIOPSY  01/23/2014    REPLACEMENT TOTAL KNEE      REPLACEMENT TOTAL KNEE         Past Family History  Family History   Problem Relation Age of Onset    Other Father         Cardiac Disorder       Past Social history  Social History     Socioeconomic History    Marital status: /Civil Union     Spouse name: Not on file    Number of children: Not on file    Years of education: Not on file    Highest education level: Not on file   Occupational History    Not on file   Social Needs    Financial resource strain: Not on file    Food insecurity:     Worry: Not on file     Inability: Not on file    Transportation needs:     Medical: Not on file     Non-medical: Not on file   Tobacco Use    Smoking status: Never Smoker    Smokeless tobacco: Never Used   Substance and Sexual Activity    Alcohol use: Yes     Comment: social    Drug use: No    Sexual activity: Not on file   Lifestyle    Physical activity:     Days per week: Not on file     Minutes per session: Not on file    Stress: Not on file   Relationships    Social connections:     Talks on phone: Not on file     Gets together: Not on file     Attends Confucianist service: Not on file     Active member of club or organization: Not on file     Attends meetings of clubs or organizations: Not on file     Relationship status: Not on file    Intimate partner violence:     Fear of current or ex partner: Not on file     Emotionally abused: Not on file     Physically abused: Not on file     Forced sexual activity: Not on file   Other Topics Concern    Not on file   Social History Narrative    Not on file Current Medications  Current Outpatient Medications   Medication Sig Dispense Refill    ALPRAZolam (XANAX) 0 25 mg tablet Take 1 tablet (0 25 mg total) by mouth 3 (three) times a day 30 tablet 0    aspirin 81 MG tablet Take by mouth      Misc Natural Products (DAILY HERBS PROSTATE PO) Take by mouth      simvastatin (ZOCOR) 20 mg tablet Take 1 tablet by mouth every night at bedtime 90 tablet 0    terazosin (HYTRIN) 5 mg capsule Take 1 capsule by mouth once daily 90 capsule 0     No current facility-administered medications for this visit  Allergies  No Known Allergies    Past Medical History, Social History, Family History, medications and allergies were reviewed  Vitals  Vitals:    02/04/20 1356   BP: 148/88   BP Location: Left arm   Patient Position: Sitting   Cuff Size: Adult   Pulse: (!) 53   Weight: 86 6 kg (191 lb)   Height: 6' 2" (1 88 m)       Physical Exam  Physical Exam   Constitutional: He is oriented to person, place, and time  He appears well-developed and well-nourished  HENT:   Head: Normocephalic  Eyes: Pupils are equal, round, and reactive to light  Neck: Normal range of motion  Cardiovascular: Normal rate and regular rhythm  Pulmonary/Chest: Effort normal    Abdominal: Soft  Normal appearance  There is no CVA tenderness  Musculoskeletal: Normal range of motion  Neurological: He is alert and oriented to person, place, and time  Skin: Skin is warm and dry  Psychiatric: He has a normal mood and affect   His behavior is normal  Judgment and thought content normal        Results    I have personally reviewed all pertinent lab results and reviewed with patient  Lab Results   Component Value Date    PSA 6 5 (H) 12/16/2019    PSA 6 2 (H) 12/07/2018    PSA 6 4 (H) 04/19/2017     Lab Results   Component Value Date    GLUCOSE 89 04/10/2015    CALCIUM 8 9 12/16/2019     04/10/2015    K 3 9 12/16/2019    CO2 29 12/16/2019     12/16/2019    BUN 13 12/16/2019 CREATININE 0 80 12/16/2019     Lab Results   Component Value Date    WBC 4 67 12/16/2019    HGB 14 2 12/16/2019    HCT 43 4 12/16/2019    MCV 91 12/16/2019     (L) 12/16/2019     No results found for this or any previous visit (from the past 1 hour(s))

## 2020-05-06 DIAGNOSIS — N13.8 BPH WITH URINARY OBSTRUCTION: ICD-10-CM

## 2020-05-06 DIAGNOSIS — E78.2 MIXED HYPERLIPIDEMIA: ICD-10-CM

## 2020-05-06 DIAGNOSIS — N40.1 BPH WITH URINARY OBSTRUCTION: ICD-10-CM

## 2020-05-06 RX ORDER — SIMVASTATIN 20 MG
20 TABLET ORAL
Qty: 90 TABLET | Refills: 1 | Status: SHIPPED | OUTPATIENT
Start: 2020-05-06 | End: 2020-10-01 | Stop reason: SDUPTHER

## 2020-05-06 RX ORDER — TERAZOSIN 5 MG/1
5 CAPSULE ORAL DAILY
Qty: 90 CAPSULE | Refills: 1 | Status: SHIPPED | OUTPATIENT
Start: 2020-05-06 | End: 2020-10-01 | Stop reason: SDUPTHER

## 2020-09-29 ENCOUNTER — IMMUNIZATIONS (OUTPATIENT)
Dept: FAMILY MEDICINE CLINIC | Facility: CLINIC | Age: 75
End: 2020-09-29
Payer: MEDICARE

## 2020-09-29 DIAGNOSIS — Z23 NEED FOR VACCINATION: Primary | ICD-10-CM

## 2020-09-29 PROCEDURE — 90662 IIV NO PRSV INCREASED AG IM: CPT

## 2020-09-29 PROCEDURE — G0008 ADMIN INFLUENZA VIRUS VAC: HCPCS

## 2020-10-01 DIAGNOSIS — E78.2 MIXED HYPERLIPIDEMIA: ICD-10-CM

## 2020-10-01 DIAGNOSIS — N13.8 BPH WITH URINARY OBSTRUCTION: ICD-10-CM

## 2020-10-01 DIAGNOSIS — N40.1 BPH WITH URINARY OBSTRUCTION: ICD-10-CM

## 2020-10-01 RX ORDER — TERAZOSIN 5 MG/1
5 CAPSULE ORAL DAILY
Qty: 90 CAPSULE | Refills: 1 | Status: SHIPPED | OUTPATIENT
Start: 2020-10-01 | End: 2021-03-08 | Stop reason: SDUPTHER

## 2020-10-01 RX ORDER — SIMVASTATIN 20 MG
20 TABLET ORAL
Qty: 90 TABLET | Refills: 1 | Status: SHIPPED | OUTPATIENT
Start: 2020-10-01 | End: 2021-03-08 | Stop reason: SDUPTHER

## 2020-12-09 ENCOUNTER — OFFICE VISIT (OUTPATIENT)
Dept: FAMILY MEDICINE CLINIC | Facility: CLINIC | Age: 75
End: 2020-12-09
Payer: MEDICARE

## 2020-12-09 VITALS
WEIGHT: 189 LBS | TEMPERATURE: 97 F | SYSTOLIC BLOOD PRESSURE: 140 MMHG | BODY MASS INDEX: 24.26 KG/M2 | DIASTOLIC BLOOD PRESSURE: 84 MMHG | HEIGHT: 74 IN

## 2020-12-09 DIAGNOSIS — E55.9 VITAMIN D DEFICIENCY: ICD-10-CM

## 2020-12-09 DIAGNOSIS — E78.2 MIXED HYPERLIPIDEMIA: ICD-10-CM

## 2020-12-09 DIAGNOSIS — I25.10 CAD IN NATIVE ARTERY: ICD-10-CM

## 2020-12-09 DIAGNOSIS — N40.0 BENIGN PROSTATIC HYPERPLASIA WITH ELEVATED PROSTATE SPECIFIC ANTIGEN (PSA): ICD-10-CM

## 2020-12-09 DIAGNOSIS — I10 ESSENTIAL HYPERTENSION: ICD-10-CM

## 2020-12-09 DIAGNOSIS — R97.20 BENIGN PROSTATIC HYPERPLASIA WITH ELEVATED PROSTATE SPECIFIC ANTIGEN (PSA): ICD-10-CM

## 2020-12-09 DIAGNOSIS — N13.8 BPH WITH URINARY OBSTRUCTION: ICD-10-CM

## 2020-12-09 DIAGNOSIS — N40.1 BPH WITH URINARY OBSTRUCTION: ICD-10-CM

## 2020-12-09 DIAGNOSIS — Z00.00 MEDICARE ANNUAL WELLNESS VISIT, SUBSEQUENT: Primary | ICD-10-CM

## 2020-12-09 PROCEDURE — 1123F ACP DISCUSS/DSCN MKR DOCD: CPT | Performed by: FAMILY MEDICINE

## 2020-12-09 PROCEDURE — G0439 PPPS, SUBSEQ VISIT: HCPCS | Performed by: FAMILY MEDICINE

## 2020-12-10 ENCOUNTER — TELEPHONE (OUTPATIENT)
Dept: UROLOGY | Facility: AMBULATORY SURGERY CENTER | Age: 75
End: 2020-12-10

## 2020-12-11 ENCOUNTER — APPOINTMENT (OUTPATIENT)
Dept: LAB | Facility: CLINIC | Age: 75
End: 2020-12-11
Payer: MEDICARE

## 2020-12-11 ENCOUNTER — LAB (OUTPATIENT)
Dept: LAB | Facility: CLINIC | Age: 75
End: 2020-12-11
Payer: MEDICARE

## 2020-12-11 ENCOUNTER — TRANSCRIBE ORDERS (OUTPATIENT)
Dept: LAB | Facility: HOSPITAL | Age: 75
End: 2020-12-11

## 2020-12-11 DIAGNOSIS — N40.1 BENIGN PROSTATIC HYPERPLASIA WITH LOWER URINARY TRACT SYMPTOMS, SYMPTOM DETAILS UNSPECIFIED: ICD-10-CM

## 2020-12-11 DIAGNOSIS — E55.9 AVITAMINOSIS D: ICD-10-CM

## 2020-12-11 DIAGNOSIS — R97.20 ELEVATED PSA: ICD-10-CM

## 2020-12-11 DIAGNOSIS — N40.1 ENLARGED PROSTATE WITH URINARY OBSTRUCTION: ICD-10-CM

## 2020-12-11 DIAGNOSIS — N13.8 NODULAR PROSTATE WITH URINARY OBSTRUCTION: Primary | ICD-10-CM

## 2020-12-11 DIAGNOSIS — N13.8 NODULAR PROSTATE WITH URINARY OBSTRUCTION: ICD-10-CM

## 2020-12-11 DIAGNOSIS — N13.8 ENLARGED PROSTATE WITH URINARY OBSTRUCTION: ICD-10-CM

## 2020-12-11 DIAGNOSIS — I25.10 ATHEROSCLEROSIS OF NATIVE CORONARY ARTERY WITHOUT ANGINA PECTORIS, UNSPECIFIED WHETHER NATIVE OR TRANSPLANTED HEART: ICD-10-CM

## 2020-12-11 DIAGNOSIS — N40.3 NODULAR PROSTATE WITH URINARY OBSTRUCTION: ICD-10-CM

## 2020-12-11 DIAGNOSIS — N40.3 NODULAR PROSTATE WITH URINARY OBSTRUCTION: Primary | ICD-10-CM

## 2020-12-11 DIAGNOSIS — E78.2 MIXED HYPERLIPIDEMIA: ICD-10-CM

## 2020-12-11 DIAGNOSIS — I10 ESSENTIAL HYPERTENSION, MALIGNANT: ICD-10-CM

## 2020-12-11 LAB
ALBUMIN SERPL BCP-MCNC: 3.7 G/DL (ref 3.5–5)
ALP SERPL-CCNC: 49 U/L (ref 46–116)
ALT SERPL W P-5'-P-CCNC: 25 U/L (ref 12–78)
ANION GAP SERPL CALCULATED.3IONS-SCNC: 3 MMOL/L (ref 4–13)
AST SERPL W P-5'-P-CCNC: 18 U/L (ref 5–45)
BASOPHILS # BLD AUTO: 0.06 THOUSANDS/ΜL (ref 0–0.1)
BASOPHILS NFR BLD AUTO: 1 % (ref 0–1)
BILIRUB SERPL-MCNC: 0.45 MG/DL (ref 0.2–1)
BUN SERPL-MCNC: 16 MG/DL (ref 5–25)
CALCIUM SERPL-MCNC: 9 MG/DL (ref 8.3–10.1)
CHLORIDE SERPL-SCNC: 108 MMOL/L (ref 100–108)
CHOLEST SERPL-MCNC: 155 MG/DL (ref 50–200)
CO2 SERPL-SCNC: 30 MMOL/L (ref 21–32)
CREAT SERPL-MCNC: 0.87 MG/DL (ref 0.6–1.3)
EOSINOPHIL # BLD AUTO: 0.1 THOUSAND/ΜL (ref 0–0.61)
EOSINOPHIL NFR BLD AUTO: 2 % (ref 0–6)
ERYTHROCYTE [DISTWIDTH] IN BLOOD BY AUTOMATED COUNT: 12.9 % (ref 11.6–15.1)
GFR SERPL CREATININE-BSD FRML MDRD: 86 ML/MIN/1.73SQ M
GLUCOSE P FAST SERPL-MCNC: 88 MG/DL (ref 65–99)
HCT VFR BLD AUTO: 43.2 % (ref 36.5–49.3)
HDLC SERPL-MCNC: 52 MG/DL
HGB BLD-MCNC: 14.3 G/DL (ref 12–17)
IMM GRANULOCYTES # BLD AUTO: 0.02 THOUSAND/UL (ref 0–0.2)
IMM GRANULOCYTES NFR BLD AUTO: 0 % (ref 0–2)
LDLC SERPL CALC-MCNC: 89 MG/DL (ref 0–100)
LYMPHOCYTES # BLD AUTO: 2.04 THOUSANDS/ΜL (ref 0.6–4.47)
LYMPHOCYTES NFR BLD AUTO: 38 % (ref 14–44)
MCH RBC QN AUTO: 30.3 PG (ref 26.8–34.3)
MCHC RBC AUTO-ENTMCNC: 33.1 G/DL (ref 31.4–37.4)
MCV RBC AUTO: 92 FL (ref 82–98)
MONOCYTES # BLD AUTO: 0.46 THOUSAND/ΜL (ref 0.17–1.22)
MONOCYTES NFR BLD AUTO: 9 % (ref 4–12)
NEUTROPHILS # BLD AUTO: 2.65 THOUSANDS/ΜL (ref 1.85–7.62)
NEUTS SEG NFR BLD AUTO: 50 % (ref 43–75)
NONHDLC SERPL-MCNC: 103 MG/DL
NRBC BLD AUTO-RTO: 0 /100 WBCS
PLATELET # BLD AUTO: 138 THOUSANDS/UL (ref 149–390)
PMV BLD AUTO: 11.4 FL (ref 8.9–12.7)
POTASSIUM SERPL-SCNC: 4.1 MMOL/L (ref 3.5–5.3)
PROT SERPL-MCNC: 7.1 G/DL (ref 6.4–8.2)
RBC # BLD AUTO: 4.72 MILLION/UL (ref 3.88–5.62)
SODIUM SERPL-SCNC: 141 MMOL/L (ref 136–145)
TRIGL SERPL-MCNC: 69 MG/DL
TSH SERPL DL<=0.05 MIU/L-ACNC: 1.74 UIU/ML (ref 0.36–3.74)
WBC # BLD AUTO: 5.33 THOUSAND/UL (ref 4.31–10.16)

## 2020-12-11 PROCEDURE — 84443 ASSAY THYROID STIM HORMONE: CPT

## 2020-12-11 PROCEDURE — 36415 COLL VENOUS BLD VENIPUNCTURE: CPT

## 2020-12-11 PROCEDURE — 85025 COMPLETE CBC W/AUTO DIFF WBC: CPT

## 2020-12-11 PROCEDURE — 84153 ASSAY OF PSA TOTAL: CPT

## 2020-12-11 PROCEDURE — 80061 LIPID PANEL: CPT

## 2020-12-11 PROCEDURE — 80053 COMPREHEN METABOLIC PANEL: CPT

## 2020-12-11 PROCEDURE — 84154 ASSAY OF PSA FREE: CPT

## 2020-12-12 LAB
PSA FREE MFR SERPL: 19.4 %
PSA FREE SERPL-MCNC: 1.2 NG/ML
PSA SERPL-MCNC: 6.2 NG/ML (ref 0–4)

## 2020-12-22 ENCOUNTER — OFFICE VISIT (OUTPATIENT)
Dept: UROLOGY | Facility: AMBULATORY SURGERY CENTER | Age: 75
End: 2020-12-22
Payer: MEDICARE

## 2020-12-22 VITALS
HEART RATE: 59 BPM | BODY MASS INDEX: 24.62 KG/M2 | SYSTOLIC BLOOD PRESSURE: 140 MMHG | WEIGHT: 191.8 LBS | DIASTOLIC BLOOD PRESSURE: 82 MMHG | HEIGHT: 74 IN

## 2020-12-22 DIAGNOSIS — N40.0 BENIGN PROSTATIC HYPERPLASIA WITH ELEVATED PROSTATE SPECIFIC ANTIGEN (PSA): Primary | ICD-10-CM

## 2020-12-22 DIAGNOSIS — Z12.5 SCREENING FOR PROSTATE CANCER: ICD-10-CM

## 2020-12-22 DIAGNOSIS — R97.20 BENIGN PROSTATIC HYPERPLASIA WITH ELEVATED PROSTATE SPECIFIC ANTIGEN (PSA): Primary | ICD-10-CM

## 2020-12-22 DIAGNOSIS — N21.0 BLADDER CALCULI: ICD-10-CM

## 2020-12-22 LAB — POST-VOID RESIDUAL VOLUME, ML POC: 92.8 ML

## 2020-12-22 PROCEDURE — 51798 US URINE CAPACITY MEASURE: CPT | Performed by: NURSE PRACTITIONER

## 2020-12-22 PROCEDURE — 99214 OFFICE O/P EST MOD 30 MIN: CPT | Performed by: NURSE PRACTITIONER

## 2020-12-23 ENCOUNTER — TELEPHONE (OUTPATIENT)
Dept: FAMILY MEDICINE CLINIC | Facility: CLINIC | Age: 75
End: 2020-12-23

## 2021-02-13 DIAGNOSIS — Z23 ENCOUNTER FOR IMMUNIZATION: ICD-10-CM

## 2021-03-08 DIAGNOSIS — N13.8 BPH WITH URINARY OBSTRUCTION: ICD-10-CM

## 2021-03-08 DIAGNOSIS — E78.2 MIXED HYPERLIPIDEMIA: ICD-10-CM

## 2021-03-08 DIAGNOSIS — N40.1 BPH WITH URINARY OBSTRUCTION: ICD-10-CM

## 2021-03-08 RX ORDER — TERAZOSIN 5 MG/1
5 CAPSULE ORAL DAILY
Qty: 90 CAPSULE | Refills: 1 | Status: SHIPPED | OUTPATIENT
Start: 2021-03-08 | End: 2021-09-29 | Stop reason: SDUPTHER

## 2021-03-08 RX ORDER — SIMVASTATIN 20 MG
20 TABLET ORAL
Qty: 90 TABLET | Refills: 1 | Status: SHIPPED | OUTPATIENT
Start: 2021-03-08 | End: 2021-09-29 | Stop reason: SDUPTHER

## 2021-09-29 DIAGNOSIS — N40.1 BPH WITH URINARY OBSTRUCTION: ICD-10-CM

## 2021-09-29 DIAGNOSIS — N13.8 BPH WITH URINARY OBSTRUCTION: ICD-10-CM

## 2021-09-29 DIAGNOSIS — E78.2 MIXED HYPERLIPIDEMIA: ICD-10-CM

## 2021-10-01 RX ORDER — SIMVASTATIN 20 MG
20 TABLET ORAL
Qty: 90 TABLET | Refills: 1 | Status: SHIPPED | OUTPATIENT
Start: 2021-10-01

## 2021-10-01 RX ORDER — TERAZOSIN 5 MG/1
5 CAPSULE ORAL DAILY
Qty: 90 CAPSULE | Refills: 1 | Status: SHIPPED | OUTPATIENT
Start: 2021-10-01

## 2021-12-23 ENCOUNTER — OFFICE VISIT (OUTPATIENT)
Dept: FAMILY MEDICINE CLINIC | Facility: CLINIC | Age: 76
End: 2021-12-23
Payer: MEDICARE

## 2021-12-23 VITALS
TEMPERATURE: 97.8 F | HEART RATE: 63 BPM | HEIGHT: 75 IN | WEIGHT: 185 LBS | OXYGEN SATURATION: 96 % | DIASTOLIC BLOOD PRESSURE: 70 MMHG | BODY MASS INDEX: 23 KG/M2 | SYSTOLIC BLOOD PRESSURE: 126 MMHG

## 2021-12-23 DIAGNOSIS — N13.8 BPH WITH URINARY OBSTRUCTION: ICD-10-CM

## 2021-12-23 DIAGNOSIS — Z23 NEED FOR VACCINATION: ICD-10-CM

## 2021-12-23 DIAGNOSIS — N40.0 BENIGN PROSTATIC HYPERPLASIA WITH ELEVATED PROSTATE SPECIFIC ANTIGEN (PSA): ICD-10-CM

## 2021-12-23 DIAGNOSIS — N40.1 BPH WITH URINARY OBSTRUCTION: ICD-10-CM

## 2021-12-23 DIAGNOSIS — Z00.00 MEDICARE ANNUAL WELLNESS VISIT, SUBSEQUENT: Primary | ICD-10-CM

## 2021-12-23 DIAGNOSIS — E78.2 MIXED HYPERLIPIDEMIA: ICD-10-CM

## 2021-12-23 DIAGNOSIS — R97.20 BENIGN PROSTATIC HYPERPLASIA WITH ELEVATED PROSTATE SPECIFIC ANTIGEN (PSA): ICD-10-CM

## 2021-12-23 DIAGNOSIS — I10 ESSENTIAL HYPERTENSION: ICD-10-CM

## 2021-12-23 DIAGNOSIS — I25.10 CAD IN NATIVE ARTERY: ICD-10-CM

## 2021-12-23 PROCEDURE — 90662 IIV NO PRSV INCREASED AG IM: CPT

## 2021-12-23 PROCEDURE — G0008 ADMIN INFLUENZA VIRUS VAC: HCPCS

## 2021-12-23 PROCEDURE — G0439 PPPS, SUBSEQ VISIT: HCPCS | Performed by: FAMILY MEDICINE

## 2022-01-03 ENCOUNTER — APPOINTMENT (OUTPATIENT)
Dept: LAB | Facility: CLINIC | Age: 77
End: 2022-01-03
Payer: MEDICARE

## 2022-01-03 ENCOUNTER — TELEPHONE (OUTPATIENT)
Dept: FAMILY MEDICINE CLINIC | Facility: CLINIC | Age: 77
End: 2022-01-03

## 2022-01-03 DIAGNOSIS — N13.8 BPH WITH URINARY OBSTRUCTION: ICD-10-CM

## 2022-01-03 DIAGNOSIS — Z00.00 MEDICARE ANNUAL WELLNESS VISIT, SUBSEQUENT: ICD-10-CM

## 2022-01-03 DIAGNOSIS — N40.0 BENIGN PROSTATIC HYPERPLASIA WITH ELEVATED PROSTATE SPECIFIC ANTIGEN (PSA): ICD-10-CM

## 2022-01-03 DIAGNOSIS — E78.2 MIXED HYPERLIPIDEMIA: ICD-10-CM

## 2022-01-03 DIAGNOSIS — N40.1 BPH WITH URINARY OBSTRUCTION: ICD-10-CM

## 2022-01-03 DIAGNOSIS — I10 ESSENTIAL HYPERTENSION: ICD-10-CM

## 2022-01-03 DIAGNOSIS — R97.20 BENIGN PROSTATIC HYPERPLASIA WITH ELEVATED PROSTATE SPECIFIC ANTIGEN (PSA): ICD-10-CM

## 2022-01-03 LAB
ALBUMIN SERPL BCP-MCNC: 3.8 G/DL (ref 3.5–5)
ALP SERPL-CCNC: 49 U/L (ref 46–116)
ALT SERPL W P-5'-P-CCNC: 29 U/L (ref 12–78)
ANION GAP SERPL CALCULATED.3IONS-SCNC: 3 MMOL/L (ref 4–13)
AST SERPL W P-5'-P-CCNC: 19 U/L (ref 5–45)
BASOPHILS # BLD AUTO: 0.05 THOUSANDS/ΜL (ref 0–0.1)
BASOPHILS NFR BLD AUTO: 1 % (ref 0–1)
BILIRUB SERPL-MCNC: 0.89 MG/DL (ref 0.2–1)
BUN SERPL-MCNC: 12 MG/DL (ref 5–25)
CALCIUM SERPL-MCNC: 9 MG/DL (ref 8.3–10.1)
CHLORIDE SERPL-SCNC: 104 MMOL/L (ref 100–108)
CHOLEST SERPL-MCNC: 159 MG/DL
CO2 SERPL-SCNC: 28 MMOL/L (ref 21–32)
CREAT SERPL-MCNC: 0.78 MG/DL (ref 0.6–1.3)
EOSINOPHIL # BLD AUTO: 0.12 THOUSAND/ΜL (ref 0–0.61)
EOSINOPHIL NFR BLD AUTO: 3 % (ref 0–6)
ERYTHROCYTE [DISTWIDTH] IN BLOOD BY AUTOMATED COUNT: 13.2 % (ref 11.6–15.1)
GFR SERPL CREATININE-BSD FRML MDRD: 88 ML/MIN/1.73SQ M
GLUCOSE P FAST SERPL-MCNC: 85 MG/DL (ref 65–99)
HCT VFR BLD AUTO: 42.8 % (ref 36.5–49.3)
HDLC SERPL-MCNC: 54 MG/DL
HGB BLD-MCNC: 14.3 G/DL (ref 12–17)
IMM GRANULOCYTES # BLD AUTO: 0.01 THOUSAND/UL (ref 0–0.2)
IMM GRANULOCYTES NFR BLD AUTO: 0 % (ref 0–2)
LDLC SERPL CALC-MCNC: 90 MG/DL (ref 0–100)
LYMPHOCYTES # BLD AUTO: 1.68 THOUSANDS/ΜL (ref 0.6–4.47)
LYMPHOCYTES NFR BLD AUTO: 35 % (ref 14–44)
MCH RBC QN AUTO: 29.7 PG (ref 26.8–34.3)
MCHC RBC AUTO-ENTMCNC: 33.4 G/DL (ref 31.4–37.4)
MCV RBC AUTO: 89 FL (ref 82–98)
MONOCYTES # BLD AUTO: 0.43 THOUSAND/ΜL (ref 0.17–1.22)
MONOCYTES NFR BLD AUTO: 9 % (ref 4–12)
NEUTROPHILS # BLD AUTO: 2.47 THOUSANDS/ΜL (ref 1.85–7.62)
NEUTS SEG NFR BLD AUTO: 52 % (ref 43–75)
NRBC BLD AUTO-RTO: 0 /100 WBCS
PLATELET # BLD AUTO: 138 THOUSANDS/UL (ref 149–390)
PMV BLD AUTO: 10.4 FL (ref 8.9–12.7)
POTASSIUM SERPL-SCNC: 3.6 MMOL/L (ref 3.5–5.3)
PROT SERPL-MCNC: 6.9 G/DL (ref 6.4–8.2)
RBC # BLD AUTO: 4.82 MILLION/UL (ref 3.88–5.62)
SODIUM SERPL-SCNC: 135 MMOL/L (ref 136–145)
TRIGL SERPL-MCNC: 75 MG/DL
TSH SERPL DL<=0.05 MIU/L-ACNC: 2.13 UIU/ML (ref 0.36–3.74)
WBC # BLD AUTO: 4.76 THOUSAND/UL (ref 4.31–10.16)

## 2022-01-03 PROCEDURE — 36415 COLL VENOUS BLD VENIPUNCTURE: CPT

## 2022-01-03 PROCEDURE — 84154 ASSAY OF PSA FREE: CPT

## 2022-01-03 PROCEDURE — 84443 ASSAY THYROID STIM HORMONE: CPT

## 2022-01-03 PROCEDURE — 80053 COMPREHEN METABOLIC PANEL: CPT

## 2022-01-03 PROCEDURE — 84153 ASSAY OF PSA TOTAL: CPT

## 2022-01-03 PROCEDURE — 80061 LIPID PANEL: CPT

## 2022-01-03 PROCEDURE — 85025 COMPLETE CBC W/AUTO DIFF WBC: CPT

## 2022-01-04 LAB
PSA FREE MFR SERPL: 19.3 %
PSA FREE SERPL-MCNC: 1.45 NG/ML
PSA SERPL-MCNC: 7.5 NG/ML (ref 0–4)

## 2022-01-05 ENCOUNTER — TELEPHONE (OUTPATIENT)
Dept: FAMILY MEDICINE CLINIC | Facility: CLINIC | Age: 77
End: 2022-01-05

## 2022-01-05 NOTE — TELEPHONE ENCOUNTER
----- Message from Nelia Person DO sent at 1/5/2022  8:16 AM EST -----  Labs are stable  PSA is lsightly higher than last year   Repeat in 6 months

## 2022-01-07 NOTE — PROGRESS NOTES
1/10/2022    Jose Elias Gonsales  1945  [id number]      Assessment  -Elevated PSA s/p negative prostate biopsy x2 (2014) and mpMRI prostate (1/2020)  -BPH with lower urinary tract symptoms  -Bladder stones    Discussion/Plan  Max Alves is a 78 y o  male being managed by Dr Negar Flores  1  Elevated PSA s/p negative prostate biopsy x2 (2014), and mpMRI prostate (1/2020)- reviewed results of his recent PSA which is 7 5, previously 6 2   2  BPH with lower urinary tract symptoms-  3  Bladder calculi-    -All questions answered, patient agrees with plan      History of Present Illness  78 y o  male with a history of BPH, elevated PSA, and bladder calculi presents today for follow up  Patient last seen in the office in December 2020  He remains on terazosin 5 mg daily  Patient has a history of elevated PSA  He has undergone 2- prostate biopsies in 2014 as well as a multiparametric MRI of prostate in January 2020  This revealed BI-RADS 2, prostate measured 150 mL  Imaging also identified numerous bladder calculi measuring 1-5 mm in size        Review of Systems  Review of Systems    Past Medical History  Past Medical History:   Diagnosis Date    BPH (benign prostatic hyperplasia)     Coronary artery disease     Diverticulosis     Pink eye        Past Social History  Past Surgical History:   Procedure Laterality Date    AORTIC VALVE SURGERY      Assessment    CORONARY ANGIOPLASTY WITH STENT PLACEMENT      CYSTOSCOPY  01/23/2014    Diagnostic    KNEE ARTHROSCOPY      PROSTATE BIOPSY  01/23/2014    REPLACEMENT TOTAL KNEE      REPLACEMENT TOTAL KNEE         Past Family History  Family History   Problem Relation Age of Onset    Other Father         Cardiac Disorder       Past Social history  Social History     Socioeconomic History    Marital status: /Civil Union     Spouse name: Not on file    Number of children: Not on file    Years of education: Not on file    Highest education level: Not on file Occupational History    Not on file   Tobacco Use    Smoking status: Never Smoker    Smokeless tobacco: Never Used   Vaping Use    Vaping Use: Never used   Substance and Sexual Activity    Alcohol use: Yes     Comment: social    Drug use: No    Sexual activity: Not on file   Other Topics Concern    Not on file   Social History Narrative    Not on file     Social Determinants of Health     Financial Resource Strain: Not on file   Food Insecurity: Not on file   Transportation Needs: Not on file   Physical Activity: Not on file   Stress: Not on file   Social Connections: Not on file   Intimate Partner Violence: Not on file   Housing Stability: Not on file       Current Medications  Current Outpatient Medications   Medication Sig Dispense Refill    ALPRAZolam (XANAX) 0 25 mg tablet Take 1 tablet (0 25 mg total) by mouth 3 (three) times a day 30 tablet 0    aspirin 81 MG tablet Take by mouth      Misc Natural Products (DAILY HERBS PROSTATE PO) Take by mouth      simvastatin (ZOCOR) 20 mg tablet Take 1 tablet (20 mg total) by mouth daily at bedtime 90 tablet 1    terazosin (HYTRIN) 5 mg capsule Take 1 capsule (5 mg total) by mouth daily 90 capsule 1     No current facility-administered medications for this visit  Allergies  Allergies   Allergen Reactions    Metoprolol Hives       Past Medical History, Social History, Family History, medications and allergies were reviewed  Vitals  There were no vitals filed for this visit      Physical Exam  Physical Exam    Results    I have personally reviewed all pertinent lab results and reviewed with patient  Lab Results   Component Value Date    PSA 7 5 (H) 01/03/2022    PSA 6 2 (H) 12/11/2020    PSA 6 5 (H) 12/16/2019     Lab Results   Component Value Date    GLUCOSE 89 04/10/2015    CALCIUM 9 0 01/03/2022     04/10/2015    K 3 6 01/03/2022    CO2 28 01/03/2022     01/03/2022    BUN 12 01/03/2022    CREATININE 0 78 01/03/2022     Lab Results Component Value Date    WBC 4 76 01/03/2022    HGB 14 3 01/03/2022    HCT 42 8 01/03/2022    MCV 89 01/03/2022     (L) 01/03/2022     No results found for this or any previous visit (from the past 1 hour(s))

## 2022-01-10 ENCOUNTER — TELEMEDICINE (OUTPATIENT)
Dept: UROLOGY | Facility: AMBULATORY SURGERY CENTER | Age: 77
End: 2022-01-10
Payer: MEDICARE

## 2022-01-10 DIAGNOSIS — N21.0 BLADDER CALCULI: ICD-10-CM

## 2022-01-10 DIAGNOSIS — R97.20 ELEVATED PSA: ICD-10-CM

## 2022-01-10 DIAGNOSIS — R97.20 BENIGN PROSTATIC HYPERPLASIA WITH ELEVATED PROSTATE SPECIFIC ANTIGEN (PSA): ICD-10-CM

## 2022-01-10 DIAGNOSIS — Z12.5 ENCOUNTER FOR SCREENING FOR MALIGNANT NEOPLASM OF PROSTATE: ICD-10-CM

## 2022-01-10 DIAGNOSIS — N40.0 BENIGN PROSTATIC HYPERPLASIA WITH ELEVATED PROSTATE SPECIFIC ANTIGEN (PSA): ICD-10-CM

## 2022-01-10 DIAGNOSIS — N40.1 BENIGN PROSTATIC HYPERPLASIA WITH LOWER URINARY TRACT SYMPTOMS, SYMPTOM DETAILS UNSPECIFIED: Primary | ICD-10-CM

## 2022-01-10 PROCEDURE — 99442 PR PHYS/QHP TELEPHONE EVALUATION 11-20 MIN: CPT | Performed by: NURSE PRACTITIONER

## 2022-01-10 NOTE — ASSESSMENT & PLAN NOTE
Numerous bladder calculi noted on prior MRI of prostate in January 2020  He remains asymptomatic, and has had no recent urinary tract infections  Discussed surgical intervention with cystolitholapaxy and TURP, but he defers at this time  Will order follow up renal ultrasound prior to next appointment

## 2022-01-10 NOTE — ASSESSMENT & PLAN NOTE
We reviewed the results of his recent PSA which is 7 5, previously 6 2  He underwent negative prostate biopsy x2 and multiparametric MRI of prostate in January 2020  Due to prior negative testing, we discussed options such as close monitoring of PSA or repeat MRI of prostate, as it has been 1 year  Patient would like to continue monitoring PSA, and if it continues to rise, proceed with MRI of prostate  He verbalizes risks  Plan to repeat PSA in 6 months with HEMA  He otherwise has no urinary complaints at this time  Patient will remain on terazosin 5mg daily, as managed by his PCP

## 2022-01-10 NOTE — PROGRESS NOTES
Virtual Brief Visit    Patient is located in the following state in which I hold an active license [place]      Assessment/Plan:    Problem List Items Addressed This Visit        Genitourinary    Benign prostatic hyperplasia with elevated prostate specific antigen (PSA)     We reviewed the results of his recent PSA which is 7 5, previously 6 2  He underwent negative prostate biopsy x2 and multiparametric MRI of prostate in January 2020  Due to prior negative testing, we discussed options such as close monitoring of PSA or repeat MRI of prostate, as it has been 1 year  Patient would like to continue monitoring PSA, and if it continues to rise, proceed with MRI of prostate  He verbalizes risks  Plan to repeat PSA in 6 months with HEMA  He otherwise has no urinary complaints at this time  Patient will remain on terazosin 5mg daily, as managed by his PCP  Bladder calculi     Numerous bladder calculi noted on prior MRI of prostate in January 2020  He remains asymptomatic, and has had no recent urinary tract infections  Discussed surgical intervention with cystolitholapaxy and TURP, but he defers at this time  Will order follow up renal ultrasound prior to next appointment  Relevant Orders    US kidney and bladder      Other Visit Diagnoses     Benign prostatic hyperplasia with lower urinary tract symptoms, symptom details unspecified    -  Primary    Elevated PSA        Relevant Orders    PSA, Total Screen    Encounter for screening for malignant neoplasm of prostate         Relevant Orders    PSA, Total Screen      Follow up in 6 months with PSA, HEMA, and renal ultrasound  All questions answered, patient verbalizes understanding, and will call sooner with any issues  History of Present Illness  78 y o  male with a history of BPH, elevated PSA, and bladder calculi presents today for follow up  Patient last seen in the office in December 2020  He remains on terazosin 5 mg daily    He reports improvement in his urinary symptoms, and has had decreased episodes of urinary frequency  He denies any gross hematuria, dysuria, or flank pain  Patient has a history of elevated PSA  He has undergone 2- prostate biopsies in 2014 as well as a multiparametric MRI of prostate in January 2020  This revealed PI-RADS 2, prostate measured 150 mL  Imaging also identified numerous bladder calculi measuring 1-5 mm in size  He has had no recent urinary tract infections  Patient denies any strong family history of prostate cancer  Recent Visits  No visits were found meeting these conditions  Showing recent visits within past 7 days and meeting all other requirements  Today's Visits  Date Type Provider Dept   01/10/22 [address] [facility]   Showing today's visits and meeting all other requirements  Future Appointments  No visits were found meeting these conditions    Showing future appointments within next 150 days and meeting all other requirements         I spent 15 minutes with patient today in which greater than 50% of the time was spent in counseling/coordination of care regarding plan of care

## 2022-05-31 ENCOUNTER — HOSPITAL ENCOUNTER (OUTPATIENT)
Dept: ULTRASOUND IMAGING | Facility: HOSPITAL | Age: 77
Discharge: HOME | End: 2022-05-31
Payer: MEDICARE

## 2022-05-31 DIAGNOSIS — N21.0 BLADDER CALCULI: ICD-10-CM

## 2022-05-31 PROCEDURE — 76770 US EXAM ABDO BACK WALL COMP: CPT

## 2022-07-09 ENCOUNTER — APPOINTMENT (OUTPATIENT)
Dept: LAB | Facility: CLINIC | Age: 77
End: 2022-07-09
Payer: MEDICARE

## 2022-07-09 DIAGNOSIS — R97.20 ELEVATED PSA: ICD-10-CM

## 2022-07-09 DIAGNOSIS — Z12.5 ENCOUNTER FOR SCREENING FOR MALIGNANT NEOPLASM OF PROSTATE: ICD-10-CM

## 2022-07-09 LAB — PSA SERPL-MCNC: 6.1 NG/ML (ref 0–4)

## 2022-07-09 PROCEDURE — G0103 PSA SCREENING: HCPCS

## 2022-07-09 PROCEDURE — 36415 COLL VENOUS BLD VENIPUNCTURE: CPT

## 2022-07-14 ENCOUNTER — OFFICE VISIT (OUTPATIENT)
Dept: FAMILY MEDICINE CLINIC | Facility: CLINIC | Age: 77
End: 2022-07-14
Payer: MEDICARE

## 2022-07-14 VITALS
OXYGEN SATURATION: 97 % | HEART RATE: 52 BPM | SYSTOLIC BLOOD PRESSURE: 140 MMHG | HEIGHT: 75 IN | DIASTOLIC BLOOD PRESSURE: 80 MMHG | BODY MASS INDEX: 22.13 KG/M2 | TEMPERATURE: 97.9 F | WEIGHT: 178 LBS

## 2022-07-14 DIAGNOSIS — R22.42 FOOT MASS, LEFT: Primary | ICD-10-CM

## 2022-07-14 PROCEDURE — 99213 OFFICE O/P EST LOW 20 MIN: CPT | Performed by: FAMILY MEDICINE

## 2022-07-14 NOTE — PROGRESS NOTES
Subjective:   Chief Complaint   Patient presents with    Follow-up     : BUMP ON left FOOT AND no PAINFUL, while ago,         Patient ID: Opal Diamond is a 78 y o  male  Patient is here with complaints of a bony prominence on the left forefoot  This is not painful  He has noticed its development over the last 2 months  He denies any trauma  There is no discoloration  The following portions of the patient's history were reviewed and updated as appropriate: allergies, current medications, past family history, past medical history, past social history, past surgical history and problem list     Review of Systems   Constitutional: Negative for activity change, appetite change, chills, diaphoresis, fatigue and unexpected weight change  HENT: Negative for congestion, ear discharge, ear pain, hearing loss, nosebleeds and rhinorrhea  Eyes: Negative for pain, redness, itching and visual disturbance  Respiratory: Negative for cough, choking, chest tightness and shortness of breath  Cardiovascular: Negative for chest pain and leg swelling  Gastrointestinal: Negative for abdominal pain, blood in stool, constipation, diarrhea and nausea  Endocrine: Negative for cold intolerance, polydipsia and polyphagia  Genitourinary: Negative for dysuria, frequency, hematuria and urgency  Musculoskeletal: Positive for joint swelling  Negative for arthralgias, back pain, gait problem, neck pain and neck stiffness  Skin: Negative for color change and rash  Allergic/Immunologic: Negative for environmental allergies and food allergies  Neurological: Negative for dizziness, tremors, seizures, speech difficulty, numbness and headaches  Hematological: Negative for adenopathy  Does not bruise/bleed easily  Psychiatric/Behavioral: Negative for behavioral problems, dysphoric mood, hallucinations and self-injury               Objective:  Vitals:    07/14/22 1641   BP: 140/80   Pulse: (!) 52   Temp: 97 9 °F (36 6 °C)   SpO2: 97%   Weight: 80 7 kg (178 lb)   Height: 6' 2 75" (1 899 m)      Physical Exam  Constitutional:       General: He is not in acute distress  Appearance: He is well-developed  He is not diaphoretic  HENT:      Head: Normocephalic and atraumatic  Right Ear: External ear normal       Left Ear: External ear normal       Nose: Nose normal       Mouth/Throat:      Pharynx: No oropharyngeal exudate  Eyes:      General: No scleral icterus  Right eye: No discharge  Left eye: No discharge  Conjunctiva/sclera: Conjunctivae normal       Pupils: Pupils are equal, round, and reactive to light  Neck:      Thyroid: No thyromegaly  Cardiovascular:      Rate and Rhythm: Normal rate and regular rhythm  Heart sounds: Normal heart sounds  No murmur heard  Pulmonary:      Effort: Pulmonary effort is normal       Breath sounds: Normal breath sounds  No wheezing or rales  Abdominal:      General: Bowel sounds are normal       Palpations: Abdomen is soft  There is no mass  Tenderness: There is no abdominal tenderness  There is no guarding  Musculoskeletal:         General: Deformity present  No tenderness  Normal range of motion  Cervical back: Normal range of motion and neck supple  Lymphadenopathy:      Cervical: No cervical adenopathy  Skin:     General: Skin is warm and dry  Neurological:      Mental Status: He is alert and oriented to person, place, and time  Deep Tendon Reflexes: Reflexes are normal and symmetric  Psychiatric:         Thought Content: Thought content normal          Judgment: Judgment normal            Assessment/Plan:    No problem-specific Assessment & Plan notes found for this encounter  Diagnoses and all orders for this visit:    Foot mass, left  -     XR foot 3+ vw left;  Future

## 2022-07-19 ENCOUNTER — OFFICE VISIT (OUTPATIENT)
Dept: UROLOGY | Facility: AMBULATORY SURGERY CENTER | Age: 77
End: 2022-07-19
Payer: MEDICARE

## 2022-07-19 ENCOUNTER — HOSPITAL ENCOUNTER (OUTPATIENT)
Dept: RADIOLOGY | Facility: HOSPITAL | Age: 77
Discharge: HOME | End: 2022-07-19
Payer: MEDICARE

## 2022-07-19 VITALS
WEIGHT: 175 LBS | SYSTOLIC BLOOD PRESSURE: 148 MMHG | HEIGHT: 75 IN | DIASTOLIC BLOOD PRESSURE: 80 MMHG | BODY MASS INDEX: 21.76 KG/M2 | OXYGEN SATURATION: 98 % | HEART RATE: 54 BPM

## 2022-07-19 DIAGNOSIS — R97.20 BENIGN PROSTATIC HYPERPLASIA WITH ELEVATED PROSTATE SPECIFIC ANTIGEN (PSA): ICD-10-CM

## 2022-07-19 DIAGNOSIS — R22.42 FOOT MASS, LEFT: ICD-10-CM

## 2022-07-19 DIAGNOSIS — N40.0 BENIGN PROSTATIC HYPERPLASIA WITH ELEVATED PROSTATE SPECIFIC ANTIGEN (PSA): ICD-10-CM

## 2022-07-19 DIAGNOSIS — N21.0 BLADDER CALCULI: Primary | ICD-10-CM

## 2022-07-19 PROCEDURE — 73630 X-RAY EXAM OF FOOT: CPT

## 2022-07-19 PROCEDURE — 99213 OFFICE O/P EST LOW 20 MIN: CPT | Performed by: NURSE PRACTITIONER

## 2022-07-19 NOTE — PROGRESS NOTES
7/19/2022    Svetlana Stands  1945  [id number]      Assessment  -BPH with lower urinary tract symptoms  -Elevated PSA  -Bladder calculi     Discussion/Plan  Efrain Gonzalez is a 78 y o  male being managed by Dr Avery Smith  1  BPH with lower urinary tract symptoms- we discussed patient's ongoing lower urinary tract symptoms as noted below  Would recommend proceeding with flexible cystoscopy to further evaluate bladder outlet as well as bladder calculi as he has not noticed any improvement on terazosin  Informed consent was provided  Patient is amenable with this plan  Reviewed dietary and behavior modifications  2  Elevated PSA s/p negative prostate biopsy x2 and mpMRI prostate- recent PSA 6 1, previously 7 5  His PSA has remained within his baseline  Follow-up with MD for cystoscopy to further evaluate bladder outlet and bladder calculi  He was advised to call sooner with any issues     -All questions answered, patient agrees with plan      History of Present Illness  78 y o  male with a history of BPH and elevated PSA presents today for follow up  Patient last evaluated via telemedicine visit in January 2022  He remains on terazosin 5 mg daily which is managed by his PCP  Patient continues to experience intermittent episodes of urinary frequency and urgency  He underwent flexible cystoscopy in 2017  Patient denies any gross hematuria or dysuria  Additional history includes elevated PSA status post negative prostate biopsy x2 as well as multiparametric MRI of prostate in January 2020  Imaging had revealed numerous bladder calculi  He has had no recent urinary tract infections  Review of Systems  Review of Systems   Constitutional: Negative  HENT: Negative  Respiratory: Negative  Cardiovascular: Negative  Gastrointestinal: Negative  Genitourinary: Positive for frequency and urgency  Negative for decreased urine volume, difficulty urinating, dysuria, flank pain and hematuria  Musculoskeletal: Negative  Skin: Negative  Neurological: Negative  Psychiatric/Behavioral: Negative          Past Medical History  Past Medical History:   Diagnosis Date    BPH (benign prostatic hyperplasia)     Coronary artery disease     Diverticulosis     Pink eye        Past Social History  Past Surgical History:   Procedure Laterality Date    AORTIC VALVE SURGERY      Assessment    CORONARY ANGIOPLASTY WITH STENT PLACEMENT      CYSTOSCOPY  01/23/2014    Diagnostic    KNEE ARTHROSCOPY      PROSTATE BIOPSY  01/23/2014    REPLACEMENT TOTAL KNEE      REPLACEMENT TOTAL KNEE         Past Family History  Family History   Problem Relation Age of Onset    Other Father         Cardiac Disorder       Past Social history  Social History     Socioeconomic History    Marital status: /Civil Union     Spouse name: Not on file    Number of children: Not on file    Years of education: Not on file    Highest education level: Not on file   Occupational History    Not on file   Tobacco Use    Smoking status: Never Smoker    Smokeless tobacco: Never Used   Vaping Use    Vaping Use: Never used   Substance and Sexual Activity    Alcohol use: Yes     Comment: social    Drug use: No    Sexual activity: Not on file   Other Topics Concern    Not on file   Social History Narrative    Not on file     Social Determinants of Health     Financial Resource Strain: Not on file   Food Insecurity: Not on file   Transportation Needs: Not on file   Physical Activity: Not on file   Stress: Not on file   Social Connections: Not on file   Intimate Partner Violence: Not on file   Housing Stability: Not on file       Current Medications  Current Outpatient Medications   Medication Sig Dispense Refill    ALPRAZolam (XANAX) 0 25 mg tablet Take 1 tablet (0 25 mg total) by mouth 3 (three) times a day 30 tablet 0    aspirin 81 MG tablet Take by mouth      simvastatin (ZOCOR) 20 mg tablet Take 1 tablet (20 mg total) by mouth daily at bedtime 90 tablet 1    terazosin (HYTRIN) 5 mg capsule Take 1 capsule (5 mg total) by mouth daily 90 capsule 1    Misc Natural Products (DAILY HERBS PROSTATE PO) Take by mouth (Patient not taking: Reported on 7/19/2022)       No current facility-administered medications for this visit  Allergies  No Known Allergies    Past Medical History, Social History, Family History, medications and allergies were reviewed  Vitals  Vitals:    07/19/22 1045   BP: 148/80   Pulse: (!) 54   SpO2: 98%   Weight: 79 4 kg (175 lb)   Height: 6' 2 75" (1 899 m)       Physical Exam  Physical Exam  Constitutional:       Appearance: Normal appearance  He is well-developed  HENT:      Head: Normocephalic  Eyes:      Pupils: Pupils are equal, round, and reactive to light  Pulmonary:      Effort: Pulmonary effort is normal    Abdominal:      Palpations: Abdomen is soft  Musculoskeletal:         General: Normal range of motion  Cervical back: Normal range of motion  Skin:     General: Skin is warm and dry  Neurological:      General: No focal deficit present  Mental Status: He is alert and oriented to person, place, and time  Psychiatric:         Mood and Affect: Mood normal          Behavior: Behavior normal          Thought Content:  Thought content normal          Judgment: Judgment normal          Results    I have personally reviewed all pertinent lab results and reviewed with patient  Lab Results   Component Value Date    PSA 6 1 (H) 07/09/2022    PSA 7 5 (H) 01/03/2022    PSA 6 2 (H) 12/11/2020     Lab Results   Component Value Date    GLUCOSE 89 04/10/2015    CALCIUM 9 0 01/03/2022     04/10/2015    K 3 6 01/03/2022    CO2 28 01/03/2022     01/03/2022    BUN 12 01/03/2022    CREATININE 0 78 01/03/2022     Lab Results   Component Value Date    WBC 4 76 01/03/2022    HGB 14 3 01/03/2022    HCT 42 8 01/03/2022    MCV 89 01/03/2022     (L) 01/03/2022     No results found for this or any previous visit (from the past 1 hour(s))

## 2022-07-19 NOTE — PATIENT INSTRUCTIONS
Cystoscopy   AMBULATORY CARE:   A cystoscopy  is a procedure to look inside your urethra and bladder using a cystoscope  A cystoscope is a small tube with a light and magnifying camera on the end  The procedure is used to diagnose and treat conditions of the bladder, urethra, or prostate  Your healthcare provider may also do a ureteroscopy during a cystoscopy  A ureteroscopy is a procedure to look inside your ureters and kidneys  Prepare for your cystoscopy: Your healthcare provider will talk to you about how to prepare for your procedure  He or she will tell you what medicines to take and not to take on the day of your procedure  You may need to stop taking medicines such as anticoagulants, aspirin, and ibuprofen several days before your procedure  Your provider may tell you stop eating after midnight the night before your procedure  You may be asked to drink a large amount of liquids before your procedure  Arrange for a ride home after your procedure  You will not be allowed to drive yourself home  During your cystoscopy: You may be given general anesthesia to keep you asleep and pain free during your procedure  Your healthcare provider may instead use local anesthesia  It is put into your urethra and bladder  You will not feel pain, but you may be able to feel some pressure during your procedure  With local anesthesia, you may feel burning or need to urinate when the cystoscope is put in and removed  If you are female, you will be placed on your back and your feet may be placed in stirrups  If you are male, you will be placed on your back or in a sitting position  The cystoscope will be placed through your urethra and into your bladder  The urologist will look at the walls of your urethra as the scope goes through to your bladder  Your bladder will be filled with liquid so your urologist can see the inside of your bladder more clearly   Tools may be inserted through the cystoscope to treat any problems in your urethra or bladder  Your provider may also take a sample of tissue and send it to a lab for tests  After your cystoscopy:  After you are fully awake, you will go home  You may see small amounts of blood in your urine  This is normal  It is also normal to have an increased need to urinate  You may also have burning or mild discomfort in your bladder or kidney area when you urinate  If you had general anesthesia, it may take at least 24 hours before you feel like your usual self  Risks of a cystoscopy: You may bleed more than expected or develop an infection  Your urethra, bladder, or ureters may get damaged during your procedure  You may have abdominal pain  Swelling caused by the cystoscopy may cause a blockage or slow urine flow  Seek care immediately if:   Your urine turns from pink to red, or you have clots in your urine  You cannot urinate and your bladder feels full  You have severe pain  Contact your healthcare provider or urologist if:   Your pain or burning during urination becomes worse or lasts longer than 1 day  Your urine stays pink for longer than 1 day  You have a fever and chills  You urinate less than usual, or still feel like you have to urinate after you use the bathroom  You have questions or concerns about your condition or care  Medicines: You may  be given any of the following:  Antibiotics  help treat or prevent a bacterial infection  Acetaminophen  decreases pain and fever  It is available without a doctor's order  Ask how much to take and how often to take it  Follow directions  Read the labels of all other medicines you are using to see if they also contain acetaminophen, or ask your doctor or pharmacist  Acetaminophen can cause liver damage if not taken correctly  Do not use more than 4 grams (4,000 milligrams) total of acetaminophen in one day  Take your medicine as directed    Contact your healthcare provider if you think your medicine is not helping or if you have side effects  Tell him or her if you are allergic to any medicine  Keep a list of the medicines, vitamins, and herbs you take  Include the amounts, and when and why you take them  Bring the list or the pill bottles to follow-up visits  Carry your medicine list with you in case of an emergency  Self-care:   Drink liquids as directed  Your healthcare provider may recommend that you drink 6 to 8 eight-ounce cups of water every day for 2 days after your procedure  Apply a warm, damp washcloth over your urethral opening  This may help to relieve discomfort  Ask when you can return to regular daily activities  Your healthcare provider may recommend that you rest after your procedure  Do not have sex  until your healthcare provider tells you it is okay  Sex may increase your risk for a urinary tract infection  Follow up with your healthcare provider as directed:  Write down your questions so you remember to ask them during your visits  © Copyright [employer] 2022 Information is for End User's use only and may not be sold, redistributed or otherwise used for commercial purposes  All illustrations and images included in CareNotes® are the copyrighted property of [employer]  or [employer] Winter   The above information is an  only  It is not intended as medical advice for individual conditions or treatments  Talk to your doctor, nurse or pharmacist before following any medical regimen to see if it is safe and effective for you

## 2022-07-25 ENCOUNTER — TELEPHONE (OUTPATIENT)
Dept: FAMILY MEDICINE CLINIC | Facility: CLINIC | Age: 77
End: 2022-07-25

## 2022-07-25 NOTE — TELEPHONE ENCOUNTER
----- Message from Lew Braxton DO sent at 7/24/2022 10:27 AM EDT -----  X-ray shows the presence of a lipoma on his foot  He does not need to do anything with it unless it is bothering him

## 2022-09-29 ENCOUNTER — PROCEDURE VISIT (OUTPATIENT)
Dept: UROLOGY | Facility: AMBULATORY SURGERY CENTER | Age: 77
End: 2022-09-29
Payer: MEDICARE

## 2022-09-29 VITALS
BODY MASS INDEX: 22.46 KG/M2 | RESPIRATION RATE: 18 BRPM | HEART RATE: 53 BPM | WEIGHT: 175 LBS | HEIGHT: 74 IN | DIASTOLIC BLOOD PRESSURE: 70 MMHG | OXYGEN SATURATION: 98 % | SYSTOLIC BLOOD PRESSURE: 160 MMHG

## 2022-09-29 DIAGNOSIS — N40.0 BENIGN PROSTATIC HYPERPLASIA WITH ELEVATED PROSTATE SPECIFIC ANTIGEN (PSA): Primary | ICD-10-CM

## 2022-09-29 DIAGNOSIS — R97.20 ELEVATED PSA: ICD-10-CM

## 2022-09-29 DIAGNOSIS — R97.20 BENIGN PROSTATIC HYPERPLASIA WITH ELEVATED PROSTATE SPECIFIC ANTIGEN (PSA): Primary | ICD-10-CM

## 2022-09-29 DIAGNOSIS — N21.0 BLADDER CALCULI: ICD-10-CM

## 2022-09-29 LAB
SL AMB  POCT GLUCOSE, UA: NORMAL
SL AMB LEUKOCYTE ESTERASE,UA: NORMAL
SL AMB POCT BILIRUBIN,UA: NORMAL
SL AMB POCT BLOOD,UA: NORMAL
SL AMB POCT CLARITY,UA: CLEAR
SL AMB POCT COLOR,UA: YELLOW
SL AMB POCT KETONES,UA: NORMAL
SL AMB POCT NITRITE,UA: NORMAL
SL AMB POCT PH,UA: 5
SL AMB POCT SPECIFIC GRAVITY,UA: 1.01
SL AMB POCT URINE PROTEIN: NORMAL
SL AMB POCT UROBILINOGEN: 0.2

## 2022-09-29 PROCEDURE — 87086 URINE CULTURE/COLONY COUNT: CPT | Performed by: UROLOGY

## 2022-09-29 PROCEDURE — 81002 URINALYSIS NONAUTO W/O SCOPE: CPT | Performed by: UROLOGY

## 2022-09-29 PROCEDURE — 52000 CYSTOURETHROSCOPY: CPT | Performed by: UROLOGY

## 2022-09-29 NOTE — ASSESSMENT & PLAN NOTE
PSA elevated but stable over many years and with history of negative biopsy and unconcerning MRI 2 years ago

## 2022-09-29 NOTE — ASSESSMENT & PLAN NOTE
The patient has a very large prostate gland associated with lower urinary tract symptoms despite medical therapy  We discussed options for procedural intervention  Given the size of his gland these center on robotic simple prostatectomy versus HoLEP surgery versus prostate artery embolization  Discussed the risks and benefits of each  Robotic simple prostatectomy offers excellent functional outcomes requires entry into the abdomen and catheter for 2 weeks while the bladder is healing with cystogram to follow and has risks of bleeding urine leak bowel injury and usually temporary incontinence  Aquablation surgery requires special equipment and an experienced urologist and has risks of bleeding  We do not offer this here but it is available in [place] and [place]  Prostate artery embolization is performed by Interventional Radiology through a minimally invasive approach through femoral vessels  This is a relatively new technology that has not been studied in a wide population but limited results show good efficacy but will take time for them to result as the prostate shrinks over time  We discussed these procedures will likely improve obstructive symptoms and may or may not improve irritative symptoms such as frequency and urgency  I do not think the patient's gland is a candidate for TURP or HoLEP based on size  I think he would be best served by robotic simple prostatectomy  We discussed this  The patient is overall not very bothered by symptoms so is not eager to pursue surgery  He will think about options and call us if he wants move forward  Of note during cystoscopy a small stone followed its way along the scope into urethra but not all the way out  He tried to void after but did not pass the stone but voided without difficulty  Discussed options of trying to repeat cystoscopy to push stone back up (no basket available) vs observation and he chose observation

## 2022-09-29 NOTE — PROGRESS NOTES
Assessment/Plan:    Bladder calculi  This is sign of bladder outlet obstruction and indication for outlet surgery    Benign prostatic hyperplasia with elevated prostate specific antigen (PSA)  The patient has a very large prostate gland associated with lower urinary tract symptoms despite medical therapy  We discussed options for procedural intervention  Given the size of his gland these center on robotic simple prostatectomy versus HoLEP surgery versus prostate artery embolization  Discussed the risks and benefits of each  Robotic simple prostatectomy offers excellent functional outcomes requires entry into the abdomen and catheter for 2 weeks while the bladder is healing with cystogram to follow and has risks of bleeding urine leak bowel injury and usually temporary incontinence  Aquablation surgery requires special equipment and an experienced urologist and has risks of bleeding  We do not offer this here but it is available in [place] and [place]  Prostate artery embolization is performed by Interventional Radiology through a minimally invasive approach through femoral vessels  This is a relatively new technology that has not been studied in a wide population but limited results show good efficacy but will take time for them to result as the prostate shrinks over time  We discussed these procedures will likely improve obstructive symptoms and may or may not improve irritative symptoms such as frequency and urgency  I do not think the patient's gland is a candidate for TURP or HoLEP based on size  I think he would be best served by robotic simple prostatectomy  We discussed this  The patient is overall not very bothered by symptoms so is not eager to pursue surgery  He will think about options and call us if he wants move forward  Of note during cystoscopy a small stone followed its way along the scope into urethra but not all the way out    He tried to void after but did not pass the stone but voided without difficulty  Discussed options of trying to repeat cystoscopy to push stone back up (no basket available) vs observation and he chose observation  Subjective:      Patient ID: Reinier Ruano is a 78 y o  male  HPI    78 y o  male with a history of BPH associated with large bladder stone burden and elevated PSA  The patient reports intermittent issues with weak stream and frequency urgency  He remains on terazosin 5 mg daily which is managed by his PCP  MRI in 2020 showed 160cc gland with bladder stones  Flexible cystoscopy today showed trilobar hypertrophy with severe trabeculations and approximately 50 the bladder stones measuring 5-10 mm in size  TRUS volume today was approximately 150 cc  Patient also has a history of elevated PSA status post negative prostate biopsy x2 as well as multiparametric MRI of prostate in January 2020 with category 2 and 160 cc size and multiple bladder stones  Most recent PSA is 6 1 which is roughly stable over the last decade (range4  5 - 7 5)    Past Surgical History:   Procedure Laterality Date    AORTIC VALVE SURGERY      Assessment    CORONARY ANGIOPLASTY WITH STENT PLACEMENT      CYSTOSCOPY  01/23/2014    Diagnostic    KNEE ARTHROSCOPY      PROSTATE BIOPSY  01/23/2014    REPLACEMENT TOTAL KNEE      REPLACEMENT TOTAL KNEE          Past Medical History:   Diagnosis Date    BPH (benign prostatic hyperplasia)     Coronary artery disease     Diverticulosis     Pink eye              Review of Systems   Constitutional: Negative for chills and fever  HENT: Negative for ear pain and sore throat  Eyes: Negative for pain and visual disturbance  Respiratory: Negative for cough and shortness of breath  Cardiovascular: Negative for chest pain and palpitations  Gastrointestinal: Negative for abdominal pain and vomiting  Genitourinary: Positive for frequency  Negative for dysuria and hematuria  Musculoskeletal: Negative for arthralgias and back pain  Skin: Negative for color change and rash  Neurological: Negative for seizures and syncope  All other systems reviewed and are negative  Objective:      /70 (BP Location: Left arm, Patient Position: Sitting, Cuff Size: Standard)   Pulse (!) 53   Resp 18   Ht 6' 2" (1 88 m)   Wt 79 4 kg (175 lb)   SpO2 98%   BMI 22 47 kg/m²     Lab Results   Component Value Date    PSA 6 1 (H) 07/09/2022    PSA 7 5 (H) 01/03/2022    PSA 6 2 (H) 12/11/2020    PSA 6 5 (H) 12/16/2019    PSA 6 2 (H) 12/07/2018    PSA 6 4 (H) 04/19/2017    PSA 6 2 (H) 12/09/2016    PSA 4 6 (H) 12/03/2015    PSA 5 6 (H) 04/10/2015    PSA 5 0 (H) 12/04/2014          Physical Exam  Vitals reviewed  Constitutional:       Appearance: Normal appearance  He is normal weight  HENT:      Head: Normocephalic and atraumatic  Eyes:      Pupils: Pupils are equal, round, and reactive to light  Abdominal:      General: Abdomen is flat  Neurological:      General: No focal deficit present  Mental Status: He is alert and oriented to person, place, and time  Psychiatric:         Mood and Affect: Mood normal          Thought Content: Thought content normal                 Cystoscopy     Date/Time 9/29/2022 11:38 AM     Performed by  Hayden Mcleod MD     Authorized by Hayden Mcleod MD      Universal Protocol:  Consent: Written consent obtained  Risks and benefits: risks, benefits and alternatives were discussed  Consent given by: patient  Time out: Immediately prior to procedure a "time out" was called to verify the correct patient, procedure, equipment, support staff and site/side marked as required    Patient understanding: patient states understanding of the procedure being performed  Patient consent: the patient's understanding of the procedure matches consent given  Procedure consent: procedure consent matches procedure scheduled  Patient identity confirmed: verbally with patient        Procedure Details:  Procedure type: cystoscopy    Patient tolerance: Patient tolerated the procedure well with no immediate complications    Additional Procedure Details: A time-out was performed identifying the correct patient site and procedure  A MA chaperone was in the room  A flexible cystoscope was introduced into the urethra  The pendulous urethra was normal   The prostatic urethra showed significant bilateral lobar hypertrophy with a large median lobe  The bladder had approximately 50 yellow smooth stones measuring 5-10 mm in size  There were no  lesions concerning for malignancy  There were moderate trabeculations and small diverticula  The ureteral orifices were not seen because of stones over taking the trigone  Photos were taken    A stone followed the scope as it was pulled back but did not come all the way out (was in pendulous urethra)  No basket available  Pt voided after without difficulty but did not pass stone  Offered repeat cysto to push stone back (would not be amenable to grasper) but he chose observation  Biopsy prostate     Date/Time 9/29/2022 11:39 AM     Performed by  Iona Cardona MD     Authorized by Iona Cardona MD      Universal Protocol   Consent: Written consent obtained  Consent given by: patient  Time out: Immediately prior to procedure a "time out" was called to verify the correct patient, procedure, equipment, support staff and site/side marked as required    Patient understanding: patient states understanding of the procedure being performed  Patient consent: the patient's understanding of the procedure matches consent given  Procedure consent: procedure consent matches procedure scheduled  Relevant documents: relevant documents present and verified  Patient identity confirmed: verbally with patient        Local anesthesia used: no     Anesthesia   Local anesthesia used: no     Sedation   Patient sedated: no        Specimen: no   Procedure Details   Procedure Notes: The patient was placed in left lateral decubitus position  An ultrasound probe was introduced into the rectum  The prostate was evaluated and measurements made showing the prostate to be 115 cc in size but did not capture the median lobe which was approximately 30 cc in size for total 145cc    Patient tolerance: patient tolerated the procedure well with no immediate complications             Orders  Orders Placed This Encounter   Procedures    Cystoscopy     This order was created via procedure documentation    Biopsy prostate     This order was created via procedure documentation    Urine culture     Order Specific Question:   Release to patient through Mychart     Answer:   Immediate    POCT urine dip

## 2022-09-30 LAB — BACTERIA UR CULT: NORMAL

## 2022-10-21 ENCOUNTER — TELEPHONE (OUTPATIENT)
Dept: UROLOGY | Facility: AMBULATORY SURGERY CENTER | Age: 77
End: 2022-10-21

## 2022-10-21 NOTE — TELEPHONE ENCOUNTER
Patient last seen 9/29/22 with Dr Jaxon Celesitn in [place] [address]  Patient calling for recommendations  Patient is away on vacation and is having bladder spasms  He stated it is painful for him when he urinates and he is drinking a lot of liquids but once he stops, the pain and urgency comes back  He is wanting to know if he should go to the ER      He is requesting a call back at [phone]

## 2022-10-21 NOTE — TELEPHONE ENCOUNTER
Returned call to patient , who is currently in [place] , advised patient that he should seek medical care urgent care or Ed for eval in the state where he currently is   He can contact our office , when he returns to the area for a fu     Patient verbalized understanding and agreement

## 2022-12-05 ENCOUNTER — PREP FOR PROCEDURE (OUTPATIENT)
Dept: UROLOGY | Facility: AMBULATORY SURGERY CENTER | Age: 77
End: 2022-12-05

## 2022-12-05 DIAGNOSIS — N40.1 BENIGN PROSTATIC HYPERPLASIA WITH LOWER URINARY TRACT SYMPTOMS, SYMPTOM DETAILS UNSPECIFIED: Primary | ICD-10-CM

## 2023-01-12 ENCOUNTER — OFFICE VISIT (OUTPATIENT)
Dept: FAMILY MEDICINE CLINIC | Facility: CLINIC | Age: 78
End: 2023-01-12

## 2023-01-12 VITALS
DIASTOLIC BLOOD PRESSURE: 79 MMHG | HEIGHT: 74 IN | HEART RATE: 58 BPM | SYSTOLIC BLOOD PRESSURE: 135 MMHG | OXYGEN SATURATION: 96 % | BODY MASS INDEX: 24 KG/M2 | WEIGHT: 187 LBS | TEMPERATURE: 97.4 F

## 2023-01-12 DIAGNOSIS — N40.0 BENIGN PROSTATIC HYPERPLASIA WITH ELEVATED PROSTATE SPECIFIC ANTIGEN (PSA): ICD-10-CM

## 2023-01-12 DIAGNOSIS — Z00.00 MEDICARE ANNUAL WELLNESS VISIT, SUBSEQUENT: Primary | ICD-10-CM

## 2023-01-12 DIAGNOSIS — K21.9 GASTROESOPHAGEAL REFLUX DISEASE WITHOUT ESOPHAGITIS: ICD-10-CM

## 2023-01-12 DIAGNOSIS — I25.10 CAD IN NATIVE ARTERY: ICD-10-CM

## 2023-01-12 DIAGNOSIS — E78.2 MIXED HYPERLIPIDEMIA: ICD-10-CM

## 2023-01-12 DIAGNOSIS — R97.20 ELEVATED PSA: ICD-10-CM

## 2023-01-12 DIAGNOSIS — R97.20 BENIGN PROSTATIC HYPERPLASIA WITH ELEVATED PROSTATE SPECIFIC ANTIGEN (PSA): ICD-10-CM

## 2023-01-12 DIAGNOSIS — Z12.5 ENCOUNTER FOR SCREENING FOR MALIGNANT NEOPLASM OF PROSTATE: ICD-10-CM

## 2023-01-12 RX ORDER — PHENAZOPYRIDINE HYDROCHLORIDE 100 MG/1
TABLET, FILM COATED ORAL
COMMUNITY
Start: 2022-10-24

## 2023-01-12 RX ORDER — CIPROFLOXACIN 500 MG/1
TABLET, FILM COATED ORAL
COMMUNITY
Start: 2022-10-21

## 2023-01-12 NOTE — PROGRESS NOTES
Assessment and Plan:     Problem List Items Addressed This Visit    None       Preventive health issues were discussed with patient, and age appropriate screening tests were ordered as noted in patient's After Visit Summary  Personalized health advice and appropriate referrals for health education or preventive services given if needed, as noted in patient's After Visit Summary  History of Present Illness:     Patient presents for a Medicare Wellness Visit    Here for awv     Patient Care Team:  Lwe Braxton DO as PCP - General  DO Nandini Floyd MD     Review of Systems:     Review of Systems   Constitutional: Negative for activity change, appetite change, chills, diaphoresis, fatigue and unexpected weight change  HENT: Negative for congestion, ear discharge, ear pain, hearing loss, nosebleeds and rhinorrhea  Eyes: Negative for pain, redness, itching and visual disturbance  Respiratory: Negative for cough, choking, chest tightness and shortness of breath  Cardiovascular: Negative for chest pain and leg swelling  Gastrointestinal: Negative for abdominal pain, blood in stool, constipation, diarrhea and nausea  Endocrine: Negative for cold intolerance, polydipsia and polyphagia  Genitourinary: Negative for dysuria, frequency, hematuria and urgency  Musculoskeletal: Negative for arthralgias, back pain, gait problem, joint swelling, neck pain and neck stiffness  Skin: Negative for color change and rash  Allergic/Immunologic: Negative for environmental allergies and food allergies  Neurological: Negative for dizziness, tremors, seizures, speech difficulty, numbness and headaches  Hematological: Negative for adenopathy  Does not bruise/bleed easily  Psychiatric/Behavioral: Negative for behavioral problems, dysphoric mood, hallucinations and self-injury          Problem List:     Patient Active Problem List   Diagnosis   • Anxiety   • Benign prostatic hyperplasia with elevated prostate specific antigen (PSA)   • CAD in native artery   • Esophageal reflux   • Hyperlipidemia   • Hypertension   • Osteopenia   • Vitamin D deficiency   • Bladder calculi   • Elevated PSA      Past Medical and Surgical History:     Past Medical History:   Diagnosis Date   • BPH (benign prostatic hyperplasia)    • Coronary artery disease    • Diverticulosis    • Pink eye      Past Surgical History:   Procedure Laterality Date   • AORTIC VALVE SURGERY      Assessment   • CORONARY ANGIOPLASTY WITH STENT PLACEMENT     • CYSTOSCOPY  01/23/2014    Diagnostic   • KNEE ARTHROSCOPY     • PROSTATE BIOPSY  01/23/2014   • REPLACEMENT TOTAL KNEE     • REPLACEMENT TOTAL KNEE        Family History:     Family History   Problem Relation Age of Onset   • Other Father         Cardiac Disorder      Social History:     Social History     Socioeconomic History   • Marital status: /Civil Union     Spouse name: None   • Number of children: None   • Years of education: None   • Highest education level: None   Occupational History   • None   Tobacco Use   • Smoking status: Never   • Smokeless tobacco: Never   Vaping Use   • Vaping Use: Never used   Substance and Sexual Activity   • Alcohol use: Yes     Comment: social   • Drug use: No   • Sexual activity: None   Other Topics Concern   • None   Social History Narrative   • None     Social Determinants of Health     Financial Resource Strain: Low Risk    • Difficulty of Paying Living Expenses: Not hard at all   Food Insecurity: Not on file   Transportation Needs: No Transportation Needs   • Lack of Transportation (Medical): No   • Lack of Transportation (Non-Medical):  No   Physical Activity: Not on file   Stress: Not on file   Social Connections: Not on file   Intimate Partner Violence: Not on file   Housing Stability: Not on file      Medications and Allergies:     Current Outpatient Medications   Medication Sig Dispense Refill   • aspirin 81 MG tablet Take by mouth • ciprofloxacin (CIPRO) 500 mg tablet TAKE 1 TABLET BY MOUTH 2 TIMES A DAY FOR 5 DAYS     • phenazopyridine (PYRIDIUM) 100 mg tablet TAKE 1-2 TABLETS (100-200 MG TOTAL) BY MOUTH 3 TIMES A DAY AS NEEDED NOT COV     • simvastatin (ZOCOR) 20 mg tablet Take 1 tablet (20 mg total) by mouth daily at bedtime 90 tablet 1   • terazosin (HYTRIN) 5 mg capsule Take 1 capsule (5 mg total) by mouth daily 90 capsule 1   • ALPRAZolam (XANAX) 0 25 mg tablet Take 1 tablet (0 25 mg total) by mouth 3 (three) times a day (Patient not taking: Reported on 9/29/2022) 30 tablet 0   • Misc Natural Products (DAILY HERBS PROSTATE PO) Take by mouth (Patient not taking: No sig reported)       No current facility-administered medications for this visit  No Known Allergies   Immunizations:     Immunization History   Administered Date(s) Administered   • COVID-19 MODERNA VACC 0 5 ML IM 02/12/2021, 11/03/2021, 05/02/2022   • Influenza, high dose seasonal 0 7 mL 11/06/2019, 09/29/2020, 12/23/2021   • Tdap 03/06/2007, 02/06/2020   • Tuberculin Skin Test-PPD Intradermal 08/06/2014, 08/09/2017      Health Maintenance:         Topic Date Due   • Colorectal Cancer Screening  11/17/2024   • Hepatitis C Screening  Completed         Topic Date Due   • Pneumococcal Vaccine: 65+ Years (1 - PCV) Never done   • COVID-19 Vaccine (4 - Booster for Danetta Alba series) 06/27/2022   • Influenza Vaccine (1) 09/01/2022      Medicare Screening Tests and Risk Assessments:     Mirian Walsh is here for his Subsequent Wellness visit  Health Risk Assessment:   Patient rates overall health as good  Patient feels that their physical health rating is same  Patient is satisfied with their life  Eyesight was rated as same  Hearing was rated as same  Patient feels that their emotional and mental health rating is same  Patients states they are never, rarely angry  Patient states they are never, rarely unusually tired/fatigued  Pain experienced in the last 7 days has been some  Patient's pain rating has been 2/10  Patient states that he has experienced no weight loss or gain in last 6 months  Depression Screening:   PHQ-2 Score: 0      Fall Risk Screening: In the past year, patient has experienced: no history of falling in past year      Home Safety:  Patient does not have trouble with stairs inside or outside of their home  Patient has working smoke alarms and has working carbon monoxide detector  Home safety hazards include: none  Nutrition:   Current diet is Low Cholesterol, Low Saturated Fat and No Added Salt  Medications:   Patient is currently taking over-the-counter supplements  OTC medications include: Fish oil  Patient is able to manage medications  Activities of Daily Living (ADLs)/Instrumental Activities of Daily Living (IADLs):   Walk and transfer into and out of bed and chair?: Yes  Dress and groom yourself?: Yes    Bathe or shower yourself?: Yes    Feed yourself?  Yes  Do your laundry/housekeeping?: Yes  Manage your money, pay your bills and track your expenses?: Yes  Make your own meals?: Yes    Do your own shopping?: Yes    Previous Hospitalizations:   Any hospitalizations or ED visits within the last 12 months?: No      Advance Care Planning:   Living will: No    Durable POA for healthcare: No    Advanced directive: No    Five wishes given: Yes      Cognitive Screening:   Provider or family/friend/caregiver concerned regarding cognition?: No    PREVENTIVE SCREENINGS      Cardiovascular Screening:    General: Screening Not Indicated and History Lipid Disorder      Colorectal Cancer Screening:     General: Screening Current      Prostate Cancer Screening:    General: Screening Not Indicated      Abdominal Aortic Aneurysm (AAA) Screening:    Risk factors include: age between 73-67 yo        Lung Cancer Screening:     General: Screening Not Indicated      Hepatitis C Screening:    General: Screening Current    Screening, Brief Intervention, and Referral to Treatment (SBIRT)    Screening  Typical number of drinks in a day: 0  Typical number of drinks in a week: 0  Interpretation: Low risk drinking behavior  AUDIT-C Screenin) How often did you have a drink containing alcohol in the past year? monthly or less  2) How many drinks did you have on a typical day when you were drinking in the past year? 0  3) How often did you have 6 or more drinks on one occasion in the past year? never    AUDIT-C Score: 1  Interpretation: Score 0-3 (male): Negative screen for alcohol misuse    Single Item Drug Screening:  How often have you used an illegal drug (including marijuana) or a prescription medication for non-medical reasons in the past year? never    Single Item Drug Screen Score: 0  Interpretation: Negative screen for possible drug use disorder    Other Counseling Topics:   Regular weightbearing exercise  No results found  Physical Exam:     There were no vitals taken for this visit      Physical Exam     Kit Richmond, DO

## 2023-01-12 NOTE — PATIENT INSTRUCTIONS
Medicare Preventive Visit Patient Instructions  Thank you for completing your Welcome to Medicare Visit or Medicare Annual Wellness Visit today  Your next wellness visit will be due in one year (1/13/2024)  The screening/preventive services that you may require over the next 5-10 years are detailed below  Some tests may not apply to you based off risk factors and/or age  Screening tests ordered at today's visit but not completed yet may show as past due  Also, please note that scanned in results may not display below  Preventive Screenings:  Service Recommendations Previous Testing/Comments   Colorectal Cancer Screening  · Colonoscopy    · Fecal Occult Blood Test (FOBT)/Fecal Immunochemical Test (FIT)  · Fecal DNA/Cologuard Test  · Flexible Sigmoidoscopy Age: 39-70 years old   Colonoscopy: every 10 years (May be performed more frequently if at higher risk)  OR  FOBT/FIT: every 1 year  OR  Cologuard: every 3 years  OR  Sigmoidoscopy: every 5 years  Screening may be recommended earlier than age 39 if at higher risk for colorectal cancer  Also, an individualized decision between you and your healthcare provider will decide whether screening between the ages of 74-80 would be appropriate   Colonoscopy: 11/17/2014  FOBT/FIT: Not on file  Cologuard: Not on file  Sigmoidoscopy: Not on file    Screening Current     Prostate Cancer Screening Individualized decision between patient and health care provider in men between ages of 53-78   Medicare will cover every 12 months beginning on the day after your 50th birthday PSA: 6 1 ng/mL     Screening Not Indicated     Hepatitis C Screening Once for adults born between 1945 and 1965  More frequently in patients at high risk for Hepatitis C Hep C Antibody: 12/07/2018    Screening Current   Diabetes Screening 1-2 times per year if you're at risk for diabetes or have pre-diabetes Fasting glucose: 85 mg/dL (1/3/2022)  A1C: 5 5 (4/25/2018)      Cholesterol Screening Once every 5 years if you don't have a lipid disorder  May order more often based on risk factors  Lipid panel: 01/03/2022  Screening Not Indicated  History Lipid Disorder      Other Preventive Screenings Covered by Medicare:  1  Abdominal Aortic Aneurysm (AAA) Screening: covered once if your at risk  You're considered to be at risk if you have a family history of AAA or a male between the age of 73-68 who smoking at least 100 cigarettes in your lifetime  2  Lung Cancer Screening: covers low dose CT scan once per year if you meet all of the following conditions: (1) Age 50-69; (2) No signs or symptoms of lung cancer; (3) Current smoker or have quit smoking within the last 15 years; (4) You have a tobacco smoking history of at least 20 pack years (packs per day x number of years you smoked); (5) You get a written order from a healthcare provider  3  Glaucoma Screening: covered annually if you're considered high risk: (1) You have diabetes OR (2) Family history of glaucoma OR (3)  aged 48 and older OR (3)  American aged 72 and older  3  Osteoporosis Screening: covered every 2 years if you meet one of the following conditions: (1) Have a vertebral abnormality; (2) On glucocorticoid therapy for more than 3 months; (3) Have primary hyperparathyroidism; (4) On osteoporosis medications and need to assess response to drug therapy  5  HIV Screening: covered annually if you're between the age of 12-76  Also covered annually if you are younger than 13 and older than 72 with risk factors for HIV infection  For pregnant patients, it is covered up to 3 times per pregnancy      Immunizations:  Immunization Recommendations   Influenza Vaccine Annual influenza vaccination during flu season is recommended for all persons aged >= 6 months who do not have contraindications   Pneumococcal Vaccine   * Pneumococcal conjugate vaccine = PCV13 (Prevnar 13), PCV15 (Vaxneuvance), PCV20 (Prevnar 20)  * Pneumococcal polysaccharide vaccine = PPSV23 (Pneumovax) Adults 2364 years old: 1-3 doses may be recommended based on certain risk factors  Adults 72 years old: 1-2 doses may be recommended based off what pneumonia vaccine you previously received   Hepatitis B Vaccine 3 dose series if at intermediate or high risk (ex: diabetes, end stage renal disease, liver disease)   Tetanus (Td) Vaccine - COST NOT COVERED BY MEDICARE PART B Following completion of primary series, a booster dose should be given every 10 years to maintain immunity against tetanus  Td may also be given as tetanus wound prophylaxis  Tdap Vaccine - COST NOT COVERED BY MEDICARE PART B Recommended at least once for all adults  For pregnant patients, recommended with each pregnancy  Shingles Vaccine (Shingrix) - COST NOT COVERED BY MEDICARE PART B  2 shot series recommended in those aged 48 and above     Health Maintenance Due:      Topic Date Due   • Colorectal Cancer Screening  11/17/2024   • Hepatitis C Screening  Completed     Immunizations Due:      Topic Date Due   • Pneumococcal Vaccine: 65+ Years (1 - PCV) Never done   • COVID-19 Vaccine (4 - Booster for Moderna series) 06/27/2022   • Influenza Vaccine (1) 09/01/2022     Advance Directives   What are advance directives? Advance directives are legal documents that state your wishes and plans for medical care  These plans are made ahead of time in case you lose your ability to make decisions for yourself  Advance directives can apply to any medical decision, such as the treatments you want, and if you want to donate organs  What are the types of advance directives? There are many types of advance directives, and each state has rules about how to use them  You may choose a combination of any of the following:  · Living will: This is a written record of the treatment you want  You can also choose which treatments you do not want, which to limit, and which to stop at a certain time   This includes surgery, medicine, IV fluid, and tube feedings  · Durable power of  for healthcare [facility]): This is a written record that states who you want to make healthcare choices for you when you are unable to make them for yourself  This person, called a proxy, is usually a family member or a friend  You may choose more than 1 proxy  · Do not resuscitate (DNR) order:  A DNR order is used in case your heart stops beating or you stop breathing  It is a request not to have certain forms of treatment, such as CPR  A DNR order may be included in other types of advance directives  · Medical directive: This covers the care that you want if you are in a coma, near death, or unable to make decisions for yourself  You can list the treatments you want for each condition  Treatment may include pain medicine, surgery, blood transfusions, dialysis, IV or tube feedings, and a ventilator (breathing machine)  · Values history: This document has questions about your views, beliefs, and how you feel and think about life  This information can help others choose the care that you would choose  Why are advance directives important? An advance directive helps you control your care  Although spoken wishes may be used, it is better to have your wishes written down  Spoken wishes can be misunderstood, or not followed  Treatments may be given even if you do not want them  An advance directive may make it easier for your family to make difficult choices about your care  © Copyright [employer] 2018 Information is for End User's use only and may not be sold, redistributed or otherwise used for commercial purposes   All illustrations and images included in CareNotes® are the copyrighted property of A D A M , Inc  or [address] [place], [place] [email or web address]

## 2023-01-18 ENCOUNTER — APPOINTMENT (OUTPATIENT)
Dept: LAB | Facility: CLINIC | Age: 78
End: 2023-01-18

## 2023-01-18 ENCOUNTER — TELEPHONE (OUTPATIENT)
Dept: FAMILY MEDICINE CLINIC | Facility: CLINIC | Age: 78
End: 2023-01-18

## 2023-01-18 DIAGNOSIS — R97.20 ELEVATED PSA: ICD-10-CM

## 2023-01-18 DIAGNOSIS — Z12.5 ENCOUNTER FOR SCREENING FOR MALIGNANT NEOPLASM OF PROSTATE: ICD-10-CM

## 2023-01-18 DIAGNOSIS — I25.10 CAD IN NATIVE ARTERY: ICD-10-CM

## 2023-01-18 DIAGNOSIS — E78.2 MIXED HYPERLIPIDEMIA: ICD-10-CM

## 2023-01-18 LAB
ALBUMIN SERPL BCP-MCNC: 3.6 G/DL (ref 3.5–5)
ALP SERPL-CCNC: 54 U/L (ref 46–116)
ALT SERPL W P-5'-P-CCNC: 38 U/L (ref 12–78)
ANION GAP SERPL CALCULATED.3IONS-SCNC: 5 MMOL/L (ref 4–13)
AST SERPL W P-5'-P-CCNC: 19 U/L (ref 5–45)
BILIRUB SERPL-MCNC: 0.49 MG/DL (ref 0.2–1)
BUN SERPL-MCNC: 14 MG/DL (ref 5–25)
CALCIUM SERPL-MCNC: 8.9 MG/DL (ref 8.3–10.1)
CHLORIDE SERPL-SCNC: 105 MMOL/L (ref 96–108)
CHOLEST SERPL-MCNC: 123 MG/DL
CO2 SERPL-SCNC: 30 MMOL/L (ref 21–32)
CREAT SERPL-MCNC: 0.8 MG/DL (ref 0.6–1.3)
ERYTHROCYTE [DISTWIDTH] IN BLOOD BY AUTOMATED COUNT: 13.8 % (ref 11.6–15.1)
GFR SERPL CREATININE-BSD FRML MDRD: 87 ML/MIN/1.73SQ M
GLUCOSE P FAST SERPL-MCNC: 87 MG/DL (ref 65–99)
HCT VFR BLD AUTO: 43.9 % (ref 36.5–49.3)
HDLC SERPL-MCNC: 51 MG/DL
HGB BLD-MCNC: 14 G/DL (ref 12–17)
LDLC SERPL CALC-MCNC: 58 MG/DL (ref 0–100)
MCH RBC QN AUTO: 29.2 PG (ref 26.8–34.3)
MCHC RBC AUTO-ENTMCNC: 31.9 G/DL (ref 31.4–37.4)
MCV RBC AUTO: 92 FL (ref 82–98)
NONHDLC SERPL-MCNC: 72 MG/DL
PLATELET # BLD AUTO: 148 THOUSANDS/UL (ref 149–390)
PMV BLD AUTO: 11 FL (ref 8.9–12.7)
POTASSIUM SERPL-SCNC: 3.9 MMOL/L (ref 3.5–5.3)
PROT SERPL-MCNC: 7.3 G/DL (ref 6.4–8.4)
PSA SERPL-MCNC: 7.4 NG/ML (ref 0–4)
RBC # BLD AUTO: 4.79 MILLION/UL (ref 3.88–5.62)
SODIUM SERPL-SCNC: 140 MMOL/L (ref 135–147)
TRIGL SERPL-MCNC: 70 MG/DL
TSH SERPL DL<=0.05 MIU/L-ACNC: 2.52 UIU/ML (ref 0.45–4.5)
WBC # BLD AUTO: 4.54 THOUSAND/UL (ref 4.31–10.16)

## 2023-01-18 NOTE — TELEPHONE ENCOUNTER
----- Message from Nathaly Gray DO sent at 1/18/2023 12:02 PM EST -----  CBC is stable    Others are still pending

## 2023-02-10 ENCOUNTER — TELEPHONE (OUTPATIENT)
Dept: OTHER | Facility: OTHER | Age: 78
End: 2023-02-10

## 2023-02-10 NOTE — TELEPHONE ENCOUNTER
Pt called returning a call to [facility]  Please call patient when office reopens regarding setting up a procedure appointment

## 2023-02-15 ENCOUNTER — TELEPHONE (OUTPATIENT)
Dept: FAMILY MEDICINE CLINIC | Facility: CLINIC | Age: 78
End: 2023-02-15

## 2023-02-15 DIAGNOSIS — R30.0 DYSURIA: Primary | ICD-10-CM

## 2023-02-15 NOTE — TELEPHONE ENCOUNTER
Patient called in wanting to ask Dr Ernandez if he can place a urine culture to [facility] in [facility] without him coming in  The reason he's requesting one is because he's having pain when urinating  His urologist recently found urine in his blood but the patient was informed by the urologist that it doesn't necessarily mean a UTI so he wants to double check

## 2023-02-16 ENCOUNTER — TELEPHONE (OUTPATIENT)
Dept: UROLOGY | Facility: AMBULATORY SURGERY CENTER | Age: 78
End: 2023-02-16

## 2023-02-16 ENCOUNTER — APPOINTMENT (OUTPATIENT)
Dept: LAB | Facility: CLINIC | Age: 78
End: 2023-02-16

## 2023-02-16 DIAGNOSIS — R30.0 DYSURIA: ICD-10-CM

## 2023-02-16 LAB
BACTERIA UR QL AUTO: ABNORMAL /HPF
BILIRUB UR QL STRIP: NEGATIVE
CLARITY UR: ABNORMAL
COLOR UR: YELLOW
GLUCOSE UR STRIP-MCNC: NEGATIVE MG/DL
HGB UR QL STRIP.AUTO: ABNORMAL
KETONES UR STRIP-MCNC: NEGATIVE MG/DL
LEUKOCYTE ESTERASE UR QL STRIP: ABNORMAL
MUCOUS THREADS UR QL AUTO: ABNORMAL
NITRITE UR QL STRIP: NEGATIVE
NON-SQ EPI CELLS URNS QL MICRO: ABNORMAL /HPF
PH UR STRIP.AUTO: 6.5 [PH]
PROT UR STRIP-MCNC: ABNORMAL MG/DL
RBC #/AREA URNS AUTO: ABNORMAL /HPF
SP GR UR STRIP.AUTO: 1.02 (ref 1–1.03)
UROBILINOGEN UR STRIP-ACNC: <2 MG/DL
WBC #/AREA URNS AUTO: ABNORMAL /HPF

## 2023-02-17 ENCOUNTER — TELEPHONE (OUTPATIENT)
Dept: FAMILY MEDICINE CLINIC | Facility: CLINIC | Age: 78
End: 2023-02-17

## 2023-02-17 NOTE — TELEPHONE ENCOUNTER
Pt called asking about his urine analysis from yesterday. Curious on interpretation of these results, are they good or bad?

## 2023-02-18 LAB
BACTERIA UR CULT: ABNORMAL
BACTERIA UR CULT: ABNORMAL

## 2023-02-20 ENCOUNTER — TELEPHONE (OUTPATIENT)
Dept: FAMILY MEDICINE CLINIC | Facility: CLINIC | Age: 78
End: 2023-02-20

## 2023-02-20 DIAGNOSIS — N30.90 CYSTITIS: Primary | ICD-10-CM

## 2023-02-20 DIAGNOSIS — N40.1 BPH WITH URINARY OBSTRUCTION: ICD-10-CM

## 2023-02-20 DIAGNOSIS — N13.8 BPH WITH URINARY OBSTRUCTION: ICD-10-CM

## 2023-02-20 RX ORDER — AMPICILLIN 500 MG/1
500 CAPSULE ORAL 4 TIMES DAILY
Qty: 4040 CAPSULE | Refills: 0 | Status: SHIPPED | OUTPATIENT
Start: 2023-02-20 | End: 2023-03-02

## 2023-02-20 RX ORDER — TERAZOSIN 5 MG/1
5 CAPSULE ORAL DAILY
Qty: 90 CAPSULE | Refills: 1 | Status: SHIPPED | OUTPATIENT
Start: 2023-02-20 | End: 2023-02-20

## 2023-02-20 RX ORDER — TERAZOSIN 5 MG/1
CAPSULE ORAL
Qty: 90 CAPSULE | Refills: 3 | Status: SHIPPED | OUTPATIENT
Start: 2023-02-20

## 2023-02-20 NOTE — TELEPHONE ENCOUNTER
----- Message from Nelia Person DO sent at 2/20/2023 11:30 AM EST -----  Urine culture is positive   I will send in an antibiotic

## 2023-03-17 ENCOUNTER — TELEPHONE (OUTPATIENT)
Dept: FAMILY MEDICINE CLINIC | Facility: CLINIC | Age: 78
End: 2023-03-17

## 2023-03-17 DIAGNOSIS — N30.90 CYSTITIS: Primary | ICD-10-CM

## 2023-03-17 NOTE — TELEPHONE ENCOUNTER
PT states that he is prone to UTI  Was recently treated for one and is requesting a refill of the RX given to him as a preventative measure  [place] and wants to be prepared in case something flares up while he is gone with no access to a doctor  Please advise      [facility] [phone]

## 2023-03-18 RX ORDER — CEPHALEXIN 500 MG/1
500 CAPSULE ORAL EVERY 6 HOURS SCHEDULED
Qty: 40 CAPSULE | Refills: 1 | Status: SHIPPED | OUTPATIENT
Start: 2023-03-18 | End: 2023-03-28

## 2023-03-22 DIAGNOSIS — N30.90 CYSTITIS: ICD-10-CM

## 2023-03-22 NOTE — TELEPHONE ENCOUNTER
Requesting Ampicillin 500 mg every 6 hours in place of the cephalexin  States that this is significantly cheaper for him and this is what has worked the best for him in the past     Please advise      [phone]

## 2023-03-23 RX ORDER — AMPICILLIN 500 MG/1
CAPSULE ORAL
Qty: 40 CAPSULE | Refills: 1 | Status: SHIPPED | OUTPATIENT
Start: 2023-03-23 | End: 2023-04-02

## 2023-03-27 ENCOUNTER — PREP FOR PROCEDURE (OUTPATIENT)
Dept: UROLOGY | Facility: AMBULATORY SURGERY CENTER | Age: 78
End: 2023-03-27

## 2023-03-27 DIAGNOSIS — Z01.818 PREOP EXAMINATION: ICD-10-CM

## 2023-03-27 DIAGNOSIS — Z01.812 PRE-PROCEDURE LAB EXAM: ICD-10-CM

## 2023-03-27 DIAGNOSIS — N40.0 BENIGN PROSTATIC HYPERPLASIA WITH ELEVATED PROSTATE SPECIFIC ANTIGEN (PSA): Primary | ICD-10-CM

## 2023-03-27 DIAGNOSIS — Z79.01 LONG TERM (CURRENT) USE OF ANTICOAGULANTS: ICD-10-CM

## 2023-03-27 DIAGNOSIS — Z01.810 PREOP CARDIOVASCULAR EXAM: ICD-10-CM

## 2023-03-27 DIAGNOSIS — R97.20 BENIGN PROSTATIC HYPERPLASIA WITH ELEVATED PROSTATE SPECIFIC ANTIGEN (PSA): Primary | ICD-10-CM

## 2023-03-27 DIAGNOSIS — R39.89 SUSPECTED UTI: ICD-10-CM

## 2023-04-10 ENCOUNTER — ANESTHESIA EVENT (OUTPATIENT)
Dept: PERIOP | Facility: HOSPITAL | Age: 78
End: 2023-04-10

## 2023-04-25 ENCOUNTER — APPOINTMENT (OUTPATIENT)
Dept: LAB | Facility: CLINIC | Age: 78
End: 2023-04-25

## 2023-04-25 ENCOUNTER — LAB REQUISITION (OUTPATIENT)
Dept: LAB | Facility: HOSPITAL | Age: 78
End: 2023-04-25

## 2023-04-25 DIAGNOSIS — R97.20 BENIGN PROSTATIC HYPERPLASIA WITH ELEVATED PROSTATE SPECIFIC ANTIGEN (PSA): ICD-10-CM

## 2023-04-25 DIAGNOSIS — Z01.818 ENCOUNTER FOR OTHER PREPROCEDURAL EXAMINATION: ICD-10-CM

## 2023-04-25 DIAGNOSIS — Z01.812 PRE-PROCEDURE LAB EXAM: ICD-10-CM

## 2023-04-25 DIAGNOSIS — Z79.01 LONG TERM (CURRENT) USE OF ANTICOAGULANTS: ICD-10-CM

## 2023-04-25 DIAGNOSIS — N40.0 BENIGN PROSTATIC HYPERPLASIA WITHOUT LOWER URINARY TRACT SYMPTOMS: ICD-10-CM

## 2023-04-25 DIAGNOSIS — R39.89 SUSPECTED UTI: ICD-10-CM

## 2023-04-25 DIAGNOSIS — Z01.812 ENCOUNTER FOR PREPROCEDURAL LABORATORY EXAMINATION: ICD-10-CM

## 2023-04-25 DIAGNOSIS — N40.0 BENIGN PROSTATIC HYPERPLASIA WITH ELEVATED PROSTATE SPECIFIC ANTIGEN (PSA): ICD-10-CM

## 2023-04-25 DIAGNOSIS — Z01.818 PREOP EXAMINATION: ICD-10-CM

## 2023-04-25 DIAGNOSIS — R97.20 ELEVATED PROSTATE SPECIFIC ANTIGEN (PSA): ICD-10-CM

## 2023-04-25 LAB
ABO GROUP BLD: NORMAL
ANION GAP SERPL CALCULATED.3IONS-SCNC: 4 MMOL/L (ref 4–13)
APTT PPP: 29 SECONDS (ref 23–37)
BASOPHILS # BLD AUTO: 0.03 THOUSANDS/ΜL (ref 0–0.1)
BASOPHILS NFR BLD AUTO: 1 % (ref 0–1)
BLD GP AB SCN SERPL QL: NEGATIVE
BUN SERPL-MCNC: 14 MG/DL (ref 5–25)
CALCIUM SERPL-MCNC: 9.3 MG/DL (ref 8.3–10.1)
CHLORIDE SERPL-SCNC: 104 MMOL/L (ref 96–108)
CO2 SERPL-SCNC: 27 MMOL/L (ref 21–32)
CREAT SERPL-MCNC: 0.81 MG/DL (ref 0.6–1.3)
EOSINOPHIL # BLD AUTO: 0.09 THOUSAND/ΜL (ref 0–0.61)
EOSINOPHIL NFR BLD AUTO: 2 % (ref 0–6)
ERYTHROCYTE [DISTWIDTH] IN BLOOD BY AUTOMATED COUNT: 13.3 % (ref 11.6–15.1)
GFR SERPL CREATININE-BSD FRML MDRD: 86 ML/MIN/1.73SQ M
GLUCOSE P FAST SERPL-MCNC: 88 MG/DL (ref 65–99)
HCT VFR BLD AUTO: 44.6 % (ref 36.5–49.3)
HGB BLD-MCNC: 14.5 G/DL (ref 12–17)
IMM GRANULOCYTES # BLD AUTO: 0.02 THOUSAND/UL (ref 0–0.2)
IMM GRANULOCYTES NFR BLD AUTO: 1 % (ref 0–2)
INR PPP: 1.11 (ref 0.84–1.19)
LYMPHOCYTES # BLD AUTO: 1.69 THOUSANDS/ΜL (ref 0.6–4.47)
LYMPHOCYTES NFR BLD AUTO: 42 % (ref 14–44)
MCH RBC QN AUTO: 29.2 PG (ref 26.8–34.3)
MCHC RBC AUTO-ENTMCNC: 32.5 G/DL (ref 31.4–37.4)
MCV RBC AUTO: 90 FL (ref 82–98)
MONOCYTES # BLD AUTO: 0.3 THOUSAND/ΜL (ref 0.17–1.22)
MONOCYTES NFR BLD AUTO: 7 % (ref 4–12)
NEUTROPHILS # BLD AUTO: 1.92 THOUSANDS/ΜL (ref 1.85–7.62)
NEUTS SEG NFR BLD AUTO: 47 % (ref 43–75)
NRBC BLD AUTO-RTO: 0 /100 WBCS
PLATELET # BLD AUTO: 132 THOUSANDS/UL (ref 149–390)
PMV BLD AUTO: 11.3 FL (ref 8.9–12.7)
POTASSIUM SERPL-SCNC: 4.1 MMOL/L (ref 3.5–5.3)
PROTHROMBIN TIME: 14.5 SECONDS (ref 11.6–14.5)
RBC # BLD AUTO: 4.96 MILLION/UL (ref 3.88–5.62)
RH BLD: POSITIVE
SODIUM SERPL-SCNC: 135 MMOL/L (ref 135–147)
SPECIMEN EXPIRATION DATE: NORMAL
WBC # BLD AUTO: 4.05 THOUSAND/UL (ref 4.31–10.16)

## 2023-04-26 ENCOUNTER — CONSULT (OUTPATIENT)
Dept: ANESTHESIOLOGY | Facility: CLINIC | Age: 78
End: 2023-04-26

## 2023-04-26 ENCOUNTER — APPOINTMENT (OUTPATIENT)
Dept: LAB | Facility: CLINIC | Age: 78
End: 2023-04-26

## 2023-04-26 VITALS
HEART RATE: 55 BPM | DIASTOLIC BLOOD PRESSURE: 70 MMHG | TEMPERATURE: 97.5 F | SYSTOLIC BLOOD PRESSURE: 128 MMHG | OXYGEN SATURATION: 98 %

## 2023-04-26 DIAGNOSIS — I25.10 CAD IN NATIVE ARTERY: ICD-10-CM

## 2023-04-26 DIAGNOSIS — I10 PRIMARY HYPERTENSION: ICD-10-CM

## 2023-04-26 DIAGNOSIS — Z01.810 PREOP CARDIOVASCULAR EXAM: ICD-10-CM

## 2023-04-26 DIAGNOSIS — D69.6 THROMBOCYTOPENIA (HCC): ICD-10-CM

## 2023-04-26 DIAGNOSIS — N40.0 BENIGN PROSTATIC HYPERPLASIA WITH ELEVATED PROSTATE SPECIFIC ANTIGEN (PSA): ICD-10-CM

## 2023-04-26 DIAGNOSIS — R97.20 BENIGN PROSTATIC HYPERPLASIA WITH ELEVATED PROSTATE SPECIFIC ANTIGEN (PSA): ICD-10-CM

## 2023-04-26 DIAGNOSIS — Z01.818 PREOP EXAMINATION: ICD-10-CM

## 2023-04-26 DIAGNOSIS — Z01.89 ENCOUNTER FOR GERIATRIC ASSESSMENT: Primary | ICD-10-CM

## 2023-04-26 LAB — BACTERIA UR CULT: NORMAL

## 2023-04-26 NOTE — PROGRESS NOTES
[facility]  CONSULT: GERIATRIC SURGERY     Assessment/Plan:  · 72-year-old male referred to [facility] for presurgery geriatric screening secondary to advanced age  · Consult concerns: advanced age  · He is scheduled on 5/22/2023    Case: [id number] Date/Time: 05/22/23 1200   Procedure: PROSTATECTOMY,SUPRAPUBIC LAPAROSCOPIC WITH ROBOTICS (Abdomen)   Anesthesia type: General   Diagnosis: Benign prostatic hyperplasia with lower urinary tract symptoms, symptom details unspecified [N40 1]   Pre-op diagnosis: Benign prostatic hyperplasia with lower urinary tract symptoms, symptom details unspecified [N40 1]   Location: [facility] OR ROOM 14 / [facility]   Surgeons: Marialuisa Salinas MD       No problem-specific Assessment & Plan notes found for this encounter  Last anesthesia   -had MLJ/TKA 5 years ago-spinal anesthesia - no problems   -not sure if he ever had general anesthesia      Problem List Items Addressed This Visit        Cardiovascular and Mediastinum    CAD in native artery  · 17 years had an MI   · C/O CP - went to PCP -had CC which revealed blockages   · Has two cardiac stents   · stable since    No chest pain & no shortness of breath  · METS 9  · We will have medical clearance 5/3/2023  · EKG to be done 4/27/2023      Hypertension  · Stable 128/70 today   · EKG to be done 4/27/2023  · METS 9, active, denies chest pain and denies shortness of breath  · We will have medical clearance 5/3/2023  5/3/2023 11:15 AM Tunde Bush, DO [facility]       HX LOW PLATELETS   · Stable today @ 132  · PCP AWARE > 1 year   · REPEAT CBC ON ADMIT to check platelets   · Repeat CBC on admit     Elevated PSA  · Had Blood in urine for > 1 year   · Workup revealed - Elevated PSA   · SCOPES revealed ENLARGED PROSTATE   · Seen today for preparation of prostatectomy   · Seen today for surgical optimization  · Seen today for geriatric assessment  · Needs medical clearance happening on 5/3/2023  · Needs to complete EKG will be done on 4/27/2023   · METS 9-denies chest pain denies shortness of breath  · PAT is reviewed as stable  · Repeat CBC on admit to ensure platelets are stable  · Started on best protocol       Other    Encounter for geriatric assessment - Primary  NO IMMEDIATE CONCERNS  No geriatric concerns  PATIENT IS [place]   This consult should not hold up discharge  Recommend inpatient geriatric consult due to advanced age  Date of Surgery: 5/22    Geriatric Assessment   · Cognitive Assessment: 5   · CAM: no   · TUG <15 sec: yes   · Falls (last 6 months): 0   · Hand  score: 86 67 lbs   -Attempt 1: 100 lbs   -Attempt 2: 80 lbs   -Attempt 3: 80 lbs   · Lincoln Total Score: 21   · PHQ- 9 Depression Scale: 0   · Nutrition Assessment Score: 14   · METS: 9 25     Walks in the morning and evening totaling 1 hour in a day  If the weather is inclement will walk on the treadmill  Incentive: 2500    3 meals a day   Breakfast: eggs, cereal, oatmeal   Lunch: sandwich, salad, leftovers   Dinner: chicken, beef, pork, fish   Snacks: fruit, protein bar, pies/cakes   Sleep: 6-8 hours   Health goals:  -What are your overall health goals? Swim more   -What brings you strength? Children, grandchildren,   -What activities are important to you?   sporting events, watch TV, swimming, shopping     High risk for post-op delirium RT age, inpatient surgery  · Mini cognitive screen level 5 no concerns   · Inpatient geriatric consult ordered for post-op    High risk for post- op pneumonia RT age, inpatient surgery  · Incentive spirometer taught to the patient and given  · Instructed to start lung exercises tonight  · Non smoker     Caregiver strain  · No- wife     Be your BEST pathway   Breath, eat, sleep/stress relief, and tracking exercise            Subjective:      Patient ID: Yadira Baum is a 70 y o  male who was referred to [facility] for pre-surgery geriatric assessment due to his age of 70 and upcoming surgery  Per patient he has a history of having some blood in his urine for over a year  He had a work-up done which revealed an elevated PSA and then he went under further investigation via scopes and was found to have an enlarged prostate  He is electing to undergo a prostatectomy  I met patient in the [facility]  He arrived alone  He walked independently  No walker  No cane  Gait was steady  There were no cognitive concerns identified today  Patient is hard of hearing  There are no immediate geriatric concerns identified today  Patient was strong with a good hand  score of 80 pounds of force  He lives at home with his wife  He is independent with all ADLs  Denies chest pain and denies shortness of breath  His METS is 9  He is active  I recommended inpatient geriatric consult after surgery due to his age  This consult should not hold up discharge  His PAT's were reviewed as stable  He does have a history of thrombocytopenia  Per patient his PCP is aware  He has had thrombocytopenia for greater than a year  Currently today the platelets are stable at 132  I did recommend rechecking the CBC on admission to ensure that his platelets are stable  He is to have medical clearance  This is happening on May 3, 2023 await office visit for any further needs  He needs to complete his EKG  He will do this 4/27/2023  4 27-ekg  5 3- PCP  5 4- PAT nurse phone call     As always we discussed having your BEST surgery, and BEST recovery  Surgery goals reviewed today  Breathing exercises   Patient was encouraged to begin lung exercises today  This could be accomplished through deep breathing and cough exercises  Patient was taught how to use an incentive spirometer  Eating/nutrition   Encouraged patient to increase oral protein intake prior to surgery  This can be accomplished by consuming chicken, fish, tuna fish, cottage cheese, cheese, eggs, [place] yogurt, and protein shakes as needed  I encouraged use of protein shakes such ENLIVE  I also recommended making your own protein shakes with protein powder  Sleep/Stress management  Patient was encouraged to rest their body prior to surgery  Encouraged attempting to get 8 hours of sleep at night  Avoid stress  Avoid sick contacts  Encouraged to find a relaxing hobby such as reading, meditation, listening to music  Training exercises  Patient was encouraged to remain active as possible  Today bilateral lower extremity generic exercises were taught for muscle strengthening and balance  All exercises to be done sitting down  The following portions of the patient's history were reviewed and updated as appropriate: current medications, past family history, past medical history, past social history, past surgical history and problem list     Review of Systems   Constitutional: Negative for chills and fever  HENT: Negative for sinus pain and sore throat  Respiratory: Negative for cough and shortness of breath  Cardiovascular: Negative for chest pain, palpitations and leg swelling  Gastrointestinal: Negative for diarrhea  Genitourinary: Negative for difficulty urinating  Skin: Negative for color change, pallor, rash and wound  Neurological: Positive for dizziness  Mild    Psychiatric/Behavioral: Negative for agitation, behavioral problems and confusion  Objective:      /70   Pulse 55   Temp 97 5 °F (36 4 °C)   SpO2 98%          Physical Exam  Constitutional:       Appearance: Normal appearance  HENT:      Head: Normocephalic  Nose: Nose normal       Mouth/Throat:      Mouth: Mucous membranes are moist    Eyes:      Pupils: Pupils are equal, round, and reactive to light  Cardiovascular:      Rate and Rhythm: Normal rate  Pulmonary:      Effort: Pulmonary effort is normal    Abdominal:      Palpations: Abdomen is soft  Musculoskeletal:         General: Normal range of motion        Cervical back: Normal range of motion  Skin:     General: Skin is warm and dry  Neurological:      General: No focal deficit present  Mental Status: He is alert and oriented to person, place, and time  Mental status is at baseline  Psychiatric:         Mood and Affect: Mood normal          Behavior: Behavior normal          Thought Content: Thought content normal          Judgment: Judgment normal                [facility]   Date of Surgery: 5/22    Geriatric Assessment   · Cognitive Assessment: 5   · CAM: no   · TUG <15 sec: yes   · Falls (last 6 months): 0   · Hand  score: 86 67 lbs   -Attempt 1: 100 lbs   -Attempt 2: 80 lbs   -Attempt 3: 80 lbs   · Lincoln Total Score: 21   · PHQ- 9 Depression Scale: 0   · Nutrition Assessment Score: 14   · METS: 9 25     Walks in the morning and evening totaling 1 hour in a day  If the weather is inclement will walk on the treadmill  Incentive: 2500    3 meals a day   Breakfast: eggs, cereal, oatmeal   Lunch: sandwich, salad, leftovers   Dinner: chicken, beef, pork, fish   Snacks: fruit, protein bar, pies/cakes   Sleep: 6-8 hours   Health goals:  -What are your overall health goals? Swim more   -What brings you strength? Children, grandchildren,   -What activities are important to you? sporting events, watch TV, swimming, shopping     Patient & I talked about [facility]  Surgery     Breathing exercises    Patient was encouraged to begin lung exercises today  Eating/nutrition   Encouraged patient to increase oral protein intake prior to surgery  This can be accomplished by consuming chicken, fish, tuna fish, cottage cheese, cheese, eggs, [place] yogurt, and protein shakes as needed  Sleep/Stress management  Patient was encouraged to rest their body prior to surgery  Encouraged attempting to get 8 hours of sleep at night  Training exercises  Patient was encouraged to remain active as possible         Incentive spirometer teaching completed, instructed to do 30  breathes 1 x a day, IS sent home with patient   Surgical soap was given  [facility]   Shirt was given

## 2023-04-27 LAB
ATRIAL RATE: 45 BPM
P AXIS: 13 DEGREES
PR INTERVAL: 210 MS
QRS AXIS: -71 DEGREES
QRSD INTERVAL: 114 MS
QT INTERVAL: 456 MS
QTC INTERVAL: 394 MS
T WAVE AXIS: 11 DEGREES
VENTRICULAR RATE: 45 BPM

## 2023-05-03 ENCOUNTER — OFFICE VISIT (OUTPATIENT)
Dept: FAMILY MEDICINE CLINIC | Facility: CLINIC | Age: 78
End: 2023-05-03

## 2023-05-03 VITALS
SYSTOLIC BLOOD PRESSURE: 155 MMHG | BODY MASS INDEX: 24.26 KG/M2 | OXYGEN SATURATION: 98 % | TEMPERATURE: 96.7 F | HEART RATE: 52 BPM | DIASTOLIC BLOOD PRESSURE: 89 MMHG | WEIGHT: 189 LBS | HEIGHT: 74 IN

## 2023-05-03 DIAGNOSIS — R00.1 SINUS BRADYCARDIA: ICD-10-CM

## 2023-05-03 DIAGNOSIS — Z01.818 PREOPERATIVE EVALUATION OF A MEDICAL CONDITION TO RULE OUT SURGICAL CONTRAINDICATIONS (TAR REQUIRED): Primary | ICD-10-CM

## 2023-05-03 DIAGNOSIS — R97.20 BENIGN PROSTATIC HYPERPLASIA WITH ELEVATED PROSTATE SPECIFIC ANTIGEN (PSA): ICD-10-CM

## 2023-05-03 DIAGNOSIS — N40.0 BENIGN PROSTATIC HYPERPLASIA WITH ELEVATED PROSTATE SPECIFIC ANTIGEN (PSA): ICD-10-CM

## 2023-05-03 NOTE — PROGRESS NOTES
Subjective:   Chief Complaint   Patient presents with    Pre-op Exam     PSA done [facility] doctors May 22nd, no other concerns         Patient ID: Gloria Sams is a 70 y o  male  Patient is here for preoperative evaluation prior to planned prostatectomy  He has had anesthesia in the past without any problems  He has been tolerating his medication  He was seen by his cardiologist in January and his EKG today is the same as it was in January  There is a sinus bradycardia with a bundle branch block  He exercises vigorously on a regular basis and has no angina  He gets occasional urinary symptoms including dysuria and hematuria  He has known bladder stones      The following portions of the patient's history were reviewed and updated as appropriate: allergies, current medications, past family history, past medical history, past social history, past surgical history and problem list     Review of Systems   Constitutional: Negative for activity change, appetite change, chills, diaphoresis, fatigue and unexpected weight change  HENT: Negative for congestion, ear discharge, ear pain, hearing loss, nosebleeds and rhinorrhea  Eyes: Negative for pain, redness, itching and visual disturbance  Respiratory: Negative for cough, choking, chest tightness and shortness of breath  Cardiovascular: Negative for chest pain and leg swelling  Gastrointestinal: Negative for abdominal pain, blood in stool, constipation, diarrhea and nausea  Endocrine: Negative for cold intolerance, polydipsia and polyphagia  Genitourinary: Positive for dysuria and hematuria  Negative for frequency and urgency  Musculoskeletal: Negative for arthralgias, back pain, gait problem, joint swelling, neck pain and neck stiffness  Skin: Negative for color change and rash  Allergic/Immunologic: Negative for environmental allergies and food allergies     Neurological: Negative for dizziness, tremors, seizures, speech difficulty, "numbness and headaches  Hematological: Negative for adenopathy  Does not bruise/bleed easily  Psychiatric/Behavioral: Negative for behavioral problems, dysphoric mood, hallucinations and self-injury  Objective:  Vitals:    05/03/23 1107   BP: 155/89   Pulse: (!) 52   Temp: (!) 96 7 °F (35 9 °C)   TempSrc: Tympanic   SpO2: 98%   Weight: 85 7 kg (189 lb)   Height: 6' 2\" (1 88 m)      Physical Exam  Constitutional:       General: He is not in acute distress  Appearance: He is well-developed  He is not diaphoretic  HENT:      Head: Normocephalic and atraumatic  Right Ear: External ear normal       Left Ear: External ear normal       Nose: Nose normal       Mouth/Throat:      Pharynx: No oropharyngeal exudate  Eyes:      General: No scleral icterus  Right eye: No discharge  Left eye: No discharge  Conjunctiva/sclera: Conjunctivae normal       Pupils: Pupils are equal, round, and reactive to light  Neck:      Thyroid: No thyromegaly  Cardiovascular:      Rate and Rhythm: Regular rhythm  Bradycardia present  Heart sounds: Normal heart sounds  No murmur heard  Pulmonary:      Effort: Pulmonary effort is normal       Breath sounds: Normal breath sounds  No wheezing or rales  Abdominal:      General: Bowel sounds are normal       Palpations: Abdomen is soft  There is no mass  Tenderness: There is no abdominal tenderness  There is no guarding  Musculoskeletal:         General: No tenderness  Normal range of motion  Cervical back: Normal range of motion and neck supple  Lymphadenopathy:      Cervical: No cervical adenopathy  Skin:     General: Skin is warm and dry  Neurological:      Mental Status: He is alert and oriented to person, place, and time  Deep Tendon Reflexes: Reflexes are normal and symmetric  Psychiatric:         Thought Content:  Thought content normal          Judgment: Judgment normal            Assessment/Plan:    No " problem-specific Assessment & Plan notes found for this encounter  Diagnoses and all orders for this visit:    Preoperative evaluation of a medical condition to rule out surgical contraindications (TAR required)    Benign prostatic hyperplasia with elevated prostate specific antigen (PSA)    Sinus bradycardia        He is cleared for the planned procedure  He will hold his aspirin for 1 week prior to the procedure    He will continue his Hytrin and statin as usual

## 2023-05-04 NOTE — PRE-PROCEDURE INSTRUCTIONS
Pre-Surgery Instructions:   Medication Instructions   • ALPRAZolam (XANAX) 0 25 mg tablet Uses PRN- OK to take day of surgery   • aspirin 81 MG tablet Stop taking 7 days prior to surgery  • phenazopyridine (PYRIDIUM) 100 mg tablet Uses PRN- DO NOT take day of surgery   • simvastatin (ZOCOR) 20 mg tablet Take night before surgery   • terazosin (HYTRIN) 5 mg capsule Take night before surgery    INSTR ON [place] CALL,  REPORT LOC , BRING PHOTO ID/MED LIST/INS  INFO ,SHOWER REV , STOP ASA/NSAID/VIT 7 DAY PREOP, PT VERBALIZES UNDERSTANDING W/ NO FURTHER QUESTIONS

## 2023-05-05 ENCOUNTER — OFFICE VISIT (OUTPATIENT)
Dept: UROLOGY | Facility: AMBULATORY SURGERY CENTER | Age: 78
End: 2023-05-05

## 2023-05-05 VITALS
DIASTOLIC BLOOD PRESSURE: 98 MMHG | WEIGHT: 185 LBS | BODY MASS INDEX: 23.75 KG/M2 | OXYGEN SATURATION: 97 % | SYSTOLIC BLOOD PRESSURE: 170 MMHG | HEART RATE: 64 BPM

## 2023-05-05 DIAGNOSIS — R97.20 ELEVATED PSA: ICD-10-CM

## 2023-05-05 DIAGNOSIS — N21.0 BLADDER CALCULI: ICD-10-CM

## 2023-05-05 DIAGNOSIS — N40.0 BENIGN PROSTATIC HYPERPLASIA WITH ELEVATED PROSTATE SPECIFIC ANTIGEN (PSA): Primary | ICD-10-CM

## 2023-05-05 DIAGNOSIS — R97.20 BENIGN PROSTATIC HYPERPLASIA WITH ELEVATED PROSTATE SPECIFIC ANTIGEN (PSA): Primary | ICD-10-CM

## 2023-05-05 LAB
SL AMB  POCT GLUCOSE, UA: NORMAL
SL AMB LEUKOCYTE ESTERASE,UA: NORMAL
SL AMB POCT BILIRUBIN,UA: NORMAL
SL AMB POCT BLOOD,UA: NORMAL
SL AMB POCT CLARITY,UA: CLEAR
SL AMB POCT COLOR,UA: NORMAL
SL AMB POCT KETONES,UA: NORMAL
SL AMB POCT NITRITE,UA: NORMAL
SL AMB POCT PH,UA: 6
SL AMB POCT SPECIFIC GRAVITY,UA: 1.01
SL AMB POCT URINE PROTEIN: NORMAL
SL AMB POCT UROBILINOGEN: 0.2

## 2023-05-05 NOTE — ASSESSMENT & PLAN NOTE
Previously discussed options for his very large prostate associated with outlet obstruction and has not done well on medical therapy  The patient wants to move forward with robotic simple prostatectomy  We discussed the risks and benefits of a robotic simple prostatectomy  It is almost guaranteed he will have some degree of urinary continence at first which should improve over time but in some and does not  Other less common risks are urine leak as well as bleeding and regrowth of prostatic tissue over time  There is also a small but real risk for bowel or major vascular injury  We also discussed that surgery is very effective at helping with obstructive symptoms of does not always help with frequency and urgency issues  Consent was obtained

## 2023-05-05 NOTE — PROGRESS NOTES
Assessment/Plan:    Benign prostatic hyperplasia with elevated prostate specific antigen (PSA)  Previously discussed options for his very large prostate associated with outlet obstruction and has not done well on medical therapy  The patient wants to move forward with robotic simple prostatectomy  We discussed the risks and benefits of a robotic simple prostatectomy  It is almost guaranteed he will have some degree of urinary continence at first which should improve over time but in some and does not  Other less common risks are urine leak as well as bleeding and regrowth of prostatic tissue over time  There is also a small but real risk for bowel or major vascular injury  We also discussed that surgery is very effective at helping with obstructive symptoms of does not always help with frequency and urgency issues  Consent was obtained  Bladder calculi  This is evidence of bladder obstruction    Elevated PSA  Patient's PSA remains elevated but stable and he has a history of negative biopsy and MRI with the last 2 years so I do not think any further work-up is needed prior to surgery  There is a chance we could discover prostate cancer via pathology but I suspect this would be low or intermediate risk cancer which would warrant subseuqent observation          Subjective:      Patient ID: Jeffrey Cantrell is a 70 y o  male  HPI    78 y  o  male with a history of BPH associated with large bladder stone burden and elevated PSA         The patient reports intermittent issues with weak stream and frequency urgency  He remains on terazosin 5 mg daily which is managed by his PCP  MRI in 2020 showed 160cc gland with bladder stones  Flexible cystoscopy September 2022 showed trilobar hypertrophy with severe trabeculations and approximately 50 the bladder stones measuring 5-10 mm in size    TRUS volume was approximately 150 cc      Patient also has a history of elevated PSA status post negative prostate biopsy x2 as well as multiparametric MRI of prostate in January 2020 with category 2 and 160 cc size and multiple bladder stones   Most recent PSA is 7 4 which is roughly stable over the last decade (range 4 5 - 7 5)    The patient surgical history significant for aortic valve replacement and cardiac stents but he has not had abdominal surgery  He is not on blood thinners other than baby aspirin  Past Surgical History:   Procedure Laterality Date    AORTIC VALVE SURGERY      Assessment    CORONARY ANGIOPLASTY WITH STENT PLACEMENT      CYSTOSCOPY  01/23/2014    Diagnostic    JOINT REPLACEMENT Right     KNEE ARTHROSCOPY      PROSTATE BIOPSY  01/23/2014    REPLACEMENT TOTAL KNEE      REPLACEMENT TOTAL KNEE          Past Medical History:   Diagnosis Date    Bladder stones     BPH (benign prostatic hyperplasia)     Coronary artery disease     Diverticulosis     Myocardial infarction ([address] )     [place] [facility]              Review of Systems   Constitutional: Negative for chills and fever  HENT: Negative for ear pain and sore throat  Eyes: Negative for pain and visual disturbance  Respiratory: Negative for cough and shortness of breath  Cardiovascular: Negative for chest pain and palpitations  Gastrointestinal: Negative for abdominal pain and vomiting  Genitourinary: Negative for dysuria and hematuria  Musculoskeletal: Negative for arthralgias and back pain  Skin: Negative for color change and rash  Neurological: Negative for seizures and syncope  All other systems reviewed and are negative  Objective: Wt 83 9 kg (185 lb)   BMI 23 75 kg/m²     Lab Results   Component Value Date    PSA 7 4 (H) 01/18/2023    PSA 6 1 (H) 07/09/2022    PSA 7 5 (H) 01/03/2022    PSA 6 2 (H) 12/11/2020    PSA 6 5 (H) 12/16/2019    PSA 6 2 (H) 12/07/2018    PSA 6 4 (H) 04/19/2017    PSA 6 2 (H) 12/09/2016    PSA 4 6 (H) 12/03/2015    PSA 5 6 (H) 04/10/2015          Physical Exam  Vitals reviewed  Constitutional:       Appearance: Normal appearance  He is normal weight  HENT:      Head: Normocephalic and atraumatic  Eyes:      Pupils: Pupils are equal, round, and reactive to light  Abdominal:      General: Abdomen is flat  Neurological:      General: No focal deficit present  Mental Status: He is alert and oriented to person, place, and time  Psychiatric:         Mood and Affect: Mood normal          Thought Content: Thought content normal            Orders  No orders of the defined types were placed in this encounter

## 2023-05-05 NOTE — ASSESSMENT & PLAN NOTE
Patient's PSA remains elevated but stable and he has a history of negative biopsy and MRI with the last 2 years so I do not think any further work-up is needed prior to surgery    There is a chance we could discover prostate cancer via pathology but I suspect this would be low or intermediate risk cancer which would warrant subseuqent observation

## 2023-05-05 NOTE — H&P (VIEW-ONLY)
Assessment/Plan:    Benign prostatic hyperplasia with elevated prostate specific antigen (PSA)  Previously discussed options for his very large prostate associated with outlet obstruction and has not done well on medical therapy  The patient wants to move forward with robotic simple prostatectomy  We discussed the risks and benefits of a robotic simple prostatectomy  It is almost guaranteed he will have some degree of urinary continence at first which should improve over time but in some and does not  Other less common risks are urine leak as well as bleeding and regrowth of prostatic tissue over time  There is also a small but real risk for bowel or major vascular injury  We also discussed that surgery is very effective at helping with obstructive symptoms of does not always help with frequency and urgency issues  Consent was obtained  Bladder calculi  This is evidence of bladder obstruction    Elevated PSA  Patient's PSA remains elevated but stable and he has a history of negative biopsy and MRI with the last 2 years so I do not think any further work-up is needed prior to surgery  There is a chance we could discover prostate cancer via pathology but I suspect this would be low or intermediate risk cancer which would warrant subseuqent observation          Subjective:      Patient ID: Jesika Ahumada is a 70 y o  male  HPI    78 y  o  male with a history of BPH associated with large bladder stone burden and elevated PSA         The patient reports intermittent issues with weak stream and frequency urgency  He remains on terazosin 5 mg daily which is managed by his PCP  MRI in 2020 showed 160cc gland with bladder stones  Flexible cystoscopy September 2022 showed trilobar hypertrophy with severe trabeculations and approximately 50 the bladder stones measuring 5-10 mm in size    TRUS volume was approximately 150 cc      Patient also has a history of elevated PSA status post negative prostate biopsy x2 as well as multiparametric MRI of prostate in January 2020 with category 2 and 160 cc size and multiple bladder stones   Most recent PSA is 7 4 which is roughly stable over the last decade (range 4 5 - 7 5)    The patient surgical history significant for aortic valve replacement and cardiac stents but he has not had abdominal surgery  He is not on blood thinners other than baby aspirin  Past Surgical History:   Procedure Laterality Date   • AORTIC VALVE SURGERY      Assessment   • CORONARY ANGIOPLASTY WITH STENT PLACEMENT     • CYSTOSCOPY  01/23/2014    Diagnostic   • JOINT REPLACEMENT Right    • KNEE ARTHROSCOPY     • PROSTATE BIOPSY  01/23/2014   • REPLACEMENT TOTAL KNEE     • REPLACEMENT TOTAL KNEE          Past Medical History:   Diagnosis Date   • Bladder stones    • BPH (benign prostatic hyperplasia)    • Coronary artery disease    • Diverticulosis    • Myocardial infarction ([facility] [address] )    • Pink eye              Review of Systems   Constitutional: Negative for chills and fever  HENT: Negative for ear pain and sore throat  Eyes: Negative for pain and visual disturbance  Respiratory: Negative for cough and shortness of breath  Cardiovascular: Negative for chest pain and palpitations  Gastrointestinal: Negative for abdominal pain and vomiting  Genitourinary: Negative for dysuria and hematuria  Musculoskeletal: Negative for arthralgias and back pain  Skin: Negative for color change and rash  Neurological: Negative for seizures and syncope  All other systems reviewed and are negative  Objective: Wt 83 9 kg (185 lb)   BMI 23 75 kg/m²     Lab Results   Component Value Date    PSA 7 4 (H) 01/18/2023    PSA 6 1 (H) 07/09/2022    PSA 7 5 (H) 01/03/2022    PSA 6 2 (H) 12/11/2020    PSA 6 5 (H) 12/16/2019    PSA 6 2 (H) 12/07/2018    PSA 6 4 (H) 04/19/2017    PSA 6 2 (H) 12/09/2016    PSA 4 6 (H) 12/03/2015    PSA 5 6 (H) 04/10/2015          Physical Exam  Vitals reviewed  Constitutional:       Appearance: Normal appearance  He is normal weight  HENT:      Head: Normocephalic and atraumatic  Eyes:      Pupils: Pupils are equal, round, and reactive to light  Abdominal:      General: Abdomen is flat  Neurological:      General: No focal deficit present  Mental Status: He is alert and oriented to person, place, and time  Psychiatric:         Mood and Affect: Mood normal          Thought Content: Thought content normal            Orders  No orders of the defined types were placed in this encounter

## 2023-05-07 LAB — BACTERIA UR CULT: NORMAL

## 2023-05-22 ENCOUNTER — HOSPITAL ENCOUNTER (OUTPATIENT)
Facility: HOSPITAL | Age: 78
Setting detail: OUTPATIENT SURGERY
Discharge: HOME | End: 2023-05-23
Attending: UROLOGY | Admitting: UROLOGY

## 2023-05-22 ENCOUNTER — ANESTHESIA (OUTPATIENT)
Dept: PERIOP | Facility: HOSPITAL | Age: 78
End: 2023-05-22

## 2023-05-22 DIAGNOSIS — N40.1 BENIGN PROSTATIC HYPERPLASIA WITH LOWER URINARY TRACT SYMPTOMS, SYMPTOM DETAILS UNSPECIFIED: Primary | ICD-10-CM

## 2023-05-22 LAB
ABO GROUP BLD: NORMAL
ANION GAP SERPL CALCULATED.3IONS-SCNC: 7 MMOL/L (ref 4–13)
BLD GP AB SCN SERPL QL: NEGATIVE
BUN SERPL-MCNC: 16 MG/DL (ref 5–25)
CALCIUM SERPL-MCNC: 8.2 MG/DL (ref 8.4–10.2)
CHLORIDE SERPL-SCNC: 100 MMOL/L (ref 96–108)
CO2 SERPL-SCNC: 24 MMOL/L (ref 21–32)
CREAT SERPL-MCNC: 0.9 MG/DL (ref 0.6–1.3)
GFR SERPL CREATININE-BSD FRML MDRD: 82 ML/MIN/1.73SQ M
GLUCOSE SERPL-MCNC: 190 MG/DL (ref 65–140)
HCT VFR BLD AUTO: 36.5 % (ref 36.5–49.3)
HGB BLD-MCNC: 12.7 G/DL (ref 12–17)
POTASSIUM SERPL-SCNC: 4.2 MMOL/L (ref 3.5–5.3)
RH BLD: POSITIVE
SODIUM SERPL-SCNC: 131 MMOL/L (ref 135–147)
SPECIMEN EXPIRATION DATE: NORMAL

## 2023-05-22 RX ORDER — BUPIVACAINE HYDROCHLORIDE 2.5 MG/ML
INJECTION, SOLUTION EPIDURAL; INFILTRATION; INTRACAUDAL AS NEEDED
Status: DISCONTINUED | OUTPATIENT
Start: 2023-05-22 | End: 2023-05-22 | Stop reason: HOSPADM

## 2023-05-22 RX ORDER — DOCUSATE SODIUM 100 MG/1
100 CAPSULE, LIQUID FILLED ORAL 2 TIMES DAILY
Status: DISCONTINUED | OUTPATIENT
Start: 2023-05-22 | End: 2023-05-23 | Stop reason: HOSPADM

## 2023-05-22 RX ORDER — OXYBUTYNIN CHLORIDE 5 MG/1
5 TABLET ORAL 2 TIMES DAILY
Status: DISCONTINUED | OUTPATIENT
Start: 2023-05-22 | End: 2023-05-23 | Stop reason: HOSPADM

## 2023-05-22 RX ORDER — BACITRACIN, NEOMYCIN, POLYMYXIN B 400; 3.5; 5 [USP'U]/G; MG/G; [USP'U]/G
1 OINTMENT TOPICAL 2 TIMES DAILY
Status: DISCONTINUED | OUTPATIENT
Start: 2023-05-22 | End: 2023-05-23 | Stop reason: HOSPADM

## 2023-05-22 RX ORDER — LIDOCAINE HYDROCHLORIDE 10 MG/ML
INJECTION, SOLUTION EPIDURAL; INFILTRATION; INTRACAUDAL; PERINEURAL AS NEEDED
Status: DISCONTINUED | OUTPATIENT
Start: 2023-05-22 | End: 2023-05-22

## 2023-05-22 RX ORDER — CEFAZOLIN SODIUM 2 G/50ML
2000 SOLUTION INTRAVENOUS ONCE
Status: COMPLETED | OUTPATIENT
Start: 2023-05-22 | End: 2023-05-22

## 2023-05-22 RX ORDER — SENNOSIDES 8.6 MG
1 TABLET ORAL DAILY
Status: DISCONTINUED | OUTPATIENT
Start: 2023-05-23 | End: 2023-05-23 | Stop reason: HOSPADM

## 2023-05-22 RX ORDER — NEOSTIGMINE METHYLSULFATE 1 MG/ML
INJECTION INTRAVENOUS AS NEEDED
Status: DISCONTINUED | OUTPATIENT
Start: 2023-05-22 | End: 2023-05-22

## 2023-05-22 RX ORDER — FENTANYL CITRATE/PF 50 MCG/ML
25 SYRINGE (ML) INJECTION
Status: DISCONTINUED | OUTPATIENT
Start: 2023-05-22 | End: 2023-05-22 | Stop reason: HOSPADM

## 2023-05-22 RX ORDER — MAGNESIUM HYDROXIDE 1200 MG/15ML
3000 LIQUID ORAL CONTINUOUS
Status: DISCONTINUED | OUTPATIENT
Start: 2023-05-22 | End: 2023-05-23

## 2023-05-22 RX ORDER — DIPHENHYDRAMINE HYDROCHLORIDE 50 MG/ML
12.5 INJECTION INTRAMUSCULAR; INTRAVENOUS ONCE AS NEEDED
Status: DISCONTINUED | OUTPATIENT
Start: 2023-05-22 | End: 2023-05-22 | Stop reason: HOSPADM

## 2023-05-22 RX ORDER — SODIUM CHLORIDE, SODIUM LACTATE, POTASSIUM CHLORIDE, CALCIUM CHLORIDE 600; 310; 30; 20 MG/100ML; MG/100ML; MG/100ML; MG/100ML
INJECTION, SOLUTION INTRAVENOUS CONTINUOUS PRN
Status: DISCONTINUED | OUTPATIENT
Start: 2023-05-22 | End: 2023-05-22

## 2023-05-22 RX ORDER — FENTANYL CITRATE 50 UG/ML
INJECTION, SOLUTION INTRAMUSCULAR; INTRAVENOUS AS NEEDED
Status: DISCONTINUED | OUTPATIENT
Start: 2023-05-22 | End: 2023-05-22

## 2023-05-22 RX ORDER — ONDANSETRON 2 MG/ML
4 INJECTION INTRAMUSCULAR; INTRAVENOUS EVERY 4 HOURS PRN
Status: DISCONTINUED | OUTPATIENT
Start: 2023-05-22 | End: 2023-05-22 | Stop reason: HOSPADM

## 2023-05-22 RX ORDER — SODIUM CHLORIDE, SODIUM LACTATE, POTASSIUM CHLORIDE, CALCIUM CHLORIDE 600; 310; 30; 20 MG/100ML; MG/100ML; MG/100ML; MG/100ML
100 INJECTION, SOLUTION INTRAVENOUS CONTINUOUS
Status: DISCONTINUED | OUTPATIENT
Start: 2023-05-22 | End: 2023-05-23 | Stop reason: HOSPADM

## 2023-05-22 RX ORDER — ALBUMIN, HUMAN INJ 5% 5 %
SOLUTION INTRAVENOUS CONTINUOUS PRN
Status: DISCONTINUED | OUTPATIENT
Start: 2023-05-22 | End: 2023-05-22

## 2023-05-22 RX ORDER — TERAZOSIN 5 MG/1
5 CAPSULE ORAL
Status: DISCONTINUED | OUTPATIENT
Start: 2023-05-22 | End: 2023-05-23 | Stop reason: HOSPADM

## 2023-05-22 RX ORDER — MAGNESIUM HYDROXIDE 1200 MG/15ML
LIQUID ORAL AS NEEDED
Status: DISCONTINUED | OUTPATIENT
Start: 2023-05-22 | End: 2023-05-22 | Stop reason: HOSPADM

## 2023-05-22 RX ORDER — HEPARIN SODIUM 5000 [USP'U]/ML
5000 INJECTION, SOLUTION INTRAVENOUS; SUBCUTANEOUS EVERY 8 HOURS SCHEDULED
Status: DISCONTINUED | OUTPATIENT
Start: 2023-05-22 | End: 2023-05-23 | Stop reason: HOSPADM

## 2023-05-22 RX ORDER — ROCURONIUM BROMIDE 10 MG/ML
INJECTION, SOLUTION INTRAVENOUS AS NEEDED
Status: DISCONTINUED | OUTPATIENT
Start: 2023-05-22 | End: 2023-05-22

## 2023-05-22 RX ORDER — ONDANSETRON 2 MG/ML
INJECTION INTRAMUSCULAR; INTRAVENOUS AS NEEDED
Status: DISCONTINUED | OUTPATIENT
Start: 2023-05-22 | End: 2023-05-22

## 2023-05-22 RX ORDER — ONDANSETRON 2 MG/ML
4 INJECTION INTRAMUSCULAR; INTRAVENOUS EVERY 6 HOURS PRN
Status: DISCONTINUED | OUTPATIENT
Start: 2023-05-22 | End: 2023-05-23 | Stop reason: HOSPADM

## 2023-05-22 RX ORDER — TRANEXAMIC ACID 10 MG/ML
INJECTION, SOLUTION INTRAVENOUS AS NEEDED
Status: DISCONTINUED | OUTPATIENT
Start: 2023-05-22 | End: 2023-05-22

## 2023-05-22 RX ORDER — PROPOFOL 10 MG/ML
INJECTION, EMULSION INTRAVENOUS AS NEEDED
Status: DISCONTINUED | OUTPATIENT
Start: 2023-05-22 | End: 2023-05-22

## 2023-05-22 RX ORDER — ACETAMINOPHEN 325 MG/1
650 TABLET ORAL EVERY 6 HOURS SCHEDULED
Status: DISCONTINUED | OUTPATIENT
Start: 2023-05-22 | End: 2023-05-23 | Stop reason: HOSPADM

## 2023-05-22 RX ORDER — HYDROMORPHONE HCL/PF 1 MG/ML
0.25 SYRINGE (ML) INJECTION
Status: DISCONTINUED | OUTPATIENT
Start: 2023-05-22 | End: 2023-05-22 | Stop reason: HOSPADM

## 2023-05-22 RX ORDER — DEXAMETHASONE SODIUM PHOSPHATE 10 MG/ML
INJECTION, SOLUTION INTRAMUSCULAR; INTRAVENOUS AS NEEDED
Status: DISCONTINUED | OUTPATIENT
Start: 2023-05-22 | End: 2023-05-22

## 2023-05-22 RX ORDER — CEFAZOLIN SODIUM 1 G/50ML
1000 SOLUTION INTRAVENOUS EVERY 8 HOURS
Status: COMPLETED | OUTPATIENT
Start: 2023-05-22 | End: 2023-05-23

## 2023-05-22 RX ORDER — FINASTERIDE 5 MG/1
5 TABLET, FILM COATED ORAL DAILY
Status: DISCONTINUED | OUTPATIENT
Start: 2023-05-23 | End: 2023-05-23 | Stop reason: HOSPADM

## 2023-05-22 RX ORDER — GLYCOPYRROLATE 0.2 MG/ML
INJECTION INTRAMUSCULAR; INTRAVENOUS AS NEEDED
Status: DISCONTINUED | OUTPATIENT
Start: 2023-05-22 | End: 2023-05-22

## 2023-05-22 RX ORDER — EPHEDRINE SULFATE 50 MG/ML
INJECTION INTRAVENOUS AS NEEDED
Status: DISCONTINUED | OUTPATIENT
Start: 2023-05-22 | End: 2023-05-22

## 2023-05-22 RX ADMIN — EPHEDRINE SULFATE 10 MG: 50 INJECTION, SOLUTION INTRAVENOUS at 16:49

## 2023-05-22 RX ADMIN — FENTANYL CITRATE 50 MCG: 50 INJECTION, SOLUTION INTRAMUSCULAR; INTRAVENOUS at 16:12

## 2023-05-22 RX ADMIN — ROCURONIUM BROMIDE 50 MG: 10 SOLUTION INTRAVENOUS at 13:06

## 2023-05-22 RX ADMIN — OXYBUTYNIN CHLORIDE 5 MG: 5 TABLET ORAL at 18:15

## 2023-05-22 RX ADMIN — SODIUM CHLORIDE FOR IRRIGATION 3000 ML: 0.9 SOLUTION IRRIGATION at 19:00

## 2023-05-22 RX ADMIN — GLYCOPYRROLATE 0.4 MG: 0.2 INJECTION, SOLUTION INTRAMUSCULAR; INTRAVENOUS at 16:39

## 2023-05-22 RX ADMIN — SODIUM CHLORIDE, SODIUM LACTATE, POTASSIUM CHLORIDE, AND CALCIUM CHLORIDE: .6; .31; .03; .02 INJECTION, SOLUTION INTRAVENOUS at 12:05

## 2023-05-22 RX ADMIN — ROCURONIUM BROMIDE 20 MG: 10 SOLUTION INTRAVENOUS at 14:33

## 2023-05-22 RX ADMIN — SODIUM CHLORIDE, SODIUM LACTATE, POTASSIUM CHLORIDE, AND CALCIUM CHLORIDE: .6; .31; .03; .02 INJECTION, SOLUTION INTRAVENOUS at 15:59

## 2023-05-22 RX ADMIN — ROCURONIUM BROMIDE 10 MG: 10 SOLUTION INTRAVENOUS at 14:04

## 2023-05-22 RX ADMIN — BACITRACIN ZINC, NEOMYCIN, POLYMYXIN B 1 LARGE APPLICATION: 400; 3.5; 5 OINTMENT TOPICAL at 22:32

## 2023-05-22 RX ADMIN — DEXAMETHASONE SODIUM PHOSPHATE 10 MG: 10 INJECTION, SOLUTION INTRAMUSCULAR; INTRAVENOUS at 13:12

## 2023-05-22 RX ADMIN — FENTANYL CITRATE 50 MCG: 50 INJECTION, SOLUTION INTRAMUSCULAR; INTRAVENOUS at 16:19

## 2023-05-22 RX ADMIN — ACETAMINOPHEN 650 MG: 325 TABLET ORAL at 18:16

## 2023-05-22 RX ADMIN — FENTANYL CITRATE 50 MCG: 50 INJECTION, SOLUTION INTRAMUSCULAR; INTRAVENOUS at 13:47

## 2023-05-22 RX ADMIN — ROCURONIUM BROMIDE 10 MG: 10 SOLUTION INTRAVENOUS at 15:18

## 2023-05-22 RX ADMIN — ACETAMINOPHEN 650 MG: 325 TABLET ORAL at 23:43

## 2023-05-22 RX ADMIN — PROPOFOL 50 MG: 10 INJECTION, EMULSION INTRAVENOUS at 13:08

## 2023-05-22 RX ADMIN — HEPARIN SODIUM 5000 UNITS: 5000 INJECTION INTRAVENOUS; SUBCUTANEOUS at 22:33

## 2023-05-22 RX ADMIN — FENTANYL CITRATE 50 MCG: 50 INJECTION, SOLUTION INTRAMUSCULAR; INTRAVENOUS at 16:32

## 2023-05-22 RX ADMIN — ROCURONIUM BROMIDE 20 MG: 10 SOLUTION INTRAVENOUS at 13:34

## 2023-05-22 RX ADMIN — TERAZOSIN HYDROCHLORIDE 5 MG: 5 CAPSULE ORAL at 22:32

## 2023-05-22 RX ADMIN — ALBUMIN (HUMAN): 12.5 INJECTION, SOLUTION INTRAVENOUS at 15:38

## 2023-05-22 RX ADMIN — PROPOFOL 150 MG: 10 INJECTION, EMULSION INTRAVENOUS at 13:06

## 2023-05-22 RX ADMIN — FENTANYL CITRATE 50 MCG: 50 INJECTION, SOLUTION INTRAMUSCULAR; INTRAVENOUS at 13:06

## 2023-05-22 RX ADMIN — CEFAZOLIN SODIUM 2000 MG: 2 SOLUTION INTRAVENOUS at 13:16

## 2023-05-22 RX ADMIN — LIDOCAINE HYDROCHLORIDE 50 MG: 10 INJECTION, SOLUTION EPIDURAL; INFILTRATION; INTRACAUDAL at 13:06

## 2023-05-22 RX ADMIN — CEFAZOLIN SODIUM 1000 MG: 1 SOLUTION INTRAVENOUS at 18:17

## 2023-05-22 RX ADMIN — DOCUSATE SODIUM 100 MG: 100 CAPSULE, LIQUID FILLED ORAL at 18:16

## 2023-05-22 RX ADMIN — PHENYLEPHRINE HYDROCHLORIDE 50 MCG/MIN: 10 INJECTION INTRAVENOUS at 13:23

## 2023-05-22 RX ADMIN — NEOSTIGMINE METHYLSULFATE 3 MG: 1 INJECTION INTRAVENOUS at 16:39

## 2023-05-22 RX ADMIN — SODIUM CHLORIDE, SODIUM LACTATE, POTASSIUM CHLORIDE, AND CALCIUM CHLORIDE 100 ML/HR: .6; .31; .03; .02 INJECTION, SOLUTION INTRAVENOUS at 18:13

## 2023-05-22 RX ADMIN — ONDANSETRON 4 MG: 2 INJECTION INTRAMUSCULAR; INTRAVENOUS at 13:12

## 2023-05-22 RX ADMIN — ROCURONIUM BROMIDE 5 MG: 10 SOLUTION INTRAVENOUS at 16:01

## 2023-05-22 RX ADMIN — TRANEXAMIC ACID 1000 MG: 10 INJECTION, SOLUTION INTRAVENOUS at 15:00

## 2023-05-22 NOTE — ANESTHESIA PREPROCEDURE EVALUATION
Procedure:  PROSTATECTOMY,SUPRAPUBIC LAPAROSCOPIC WITH ROBOTICS (Abdomen)  INSERTION STENT URETERAL (Bilateral: Bladder)    Relevant Problems   CARDIO   (+) CAD in native artery (Remote h/o MI s/p stents x2)   (+) Hyperlipidemia   (+) Hypertension      GI/HEPATIC   (+) Esophageal reflux      /RENAL   (+) Benign prostatic hyperplasia with elevated prostate specific antigen (PSA)      HEMATOLOGY   (+) Thrombocytopenia (HCC)      NEURO/PSYCH   (+) Anxiety      Lab Results   Component Value Date    WBC 4 05 (L) 04/25/2023    HGB 14 5 04/25/2023    HCT 44 6 04/25/2023    MCV 90 04/25/2023     (L) 04/25/2023     Lab Results   Component Value Date     04/10/2015    K 4 1 04/25/2023    CO2 27 04/25/2023     04/25/2023    BUN 14 04/25/2023    CREATININE 0 81 04/25/2023     Lab Results   Component Value Date    INR 1 11 04/25/2023    PROTIME 14 5 04/25/2023     Lab Results   Component Value Date    PTT 29 04/25/2023       Lab Results   Component Value Date    GLUCOSE 89 04/10/2015    GLUCOSE 89 01/20/2014    GLUCOSE 86 12/13/2013       Lab Results   Component Value Date    HGBA1C 5 5 04/25/2018       Type and Screen:  O    Physical Exam    Airway    Mallampati score: II  TM Distance: >3 FB  Neck ROM: full     Dental   Comment: Numerous missing and carious molars, incisors and canines intact and not loose per patient,     Cardiovascular      Pulmonary      Other Findings        Anesthesia Plan  ASA Score- 3     Anesthesia Type- general with ASA Monitors  Additional Monitors:   Airway Plan: ETT  Plan Factors-Exercise tolerance (METS): >4 METS  Chart reviewed  Existing labs reviewed  Patient summary reviewed  Patient is not a current smoker  Induction- intravenous  Postoperative Plan- Plan for postoperative opioid use  Planned trial extubation    Informed Consent- Anesthetic plan and risks discussed with patient  I personally reviewed this patient with the CRNA   Discussed and agreed on the Anesthesia Plan with the CRNA  Domo Torres

## 2023-05-22 NOTE — ANESTHESIA POSTPROCEDURE EVALUATION
Post-Op Assessment Note    CV Status:  Stable  Pain Score: 0    Pain management: adequate     Mental Status:  Alert and awake   Hydration Status:  Euvolemic   PONV Controlled:  Controlled   Airway Patency:  Patent      Post Op Vitals Reviewed: Yes      Staff: CRNA         No notable events documented      BP  148/67   Temp   97 2   Pulse  66   Resp   17   SpO2   99

## 2023-05-22 NOTE — INTERVAL H&P NOTE
H&P reviewed  After examining the patient I find no changes in the patients condition since the H&P had been written  Vitals:    05/22/23 1121   BP: (!) 183/83   Pulse: 62   Resp: 16   Temp: 97 6 °F (36 4 °C)   SpO2: 98%     No changes the patient's health  Urine culture does not show significant infection  Plan for robot-assisted prostatectomy with evacuation of bladder stones

## 2023-05-22 NOTE — DISCHARGE INSTR - AVS FIRST PAGE
Mr Clement Stoddard,    Your robotic simple prostatectomy surgery went well  We removed a large adenoma from the prostate  This should hopefully help you void well after catheter was removed  Will plan to keep the catheter placed for about 2 weeks as previously discussed to help your bladder heal from the incision created to perform surgery  The nursing team will teach you how to take care of your navarro catheter including how to drain it and switch from a leg bag to the larger bag  Keep your incisions clean and dry  The glue on your skin will fall off on its own in 3-4 weeks  Drink plenty of fluids and eat when you are hungry  You may shower at will, but do not submerge yourself in a bath or hot tub for 2 weeks  It is normal to have some drainage around your catheter  You may feel the need to urinate and you may notice some leakage around the catheter during this time  You need not worry about this, but only worry if the catheter is not draining at all  You may also notice a creamy yellow brown substance around where the catheter enters the penis, these are normal periurethral secretions and they do not signify infection  You have been prescribed an aggressive bowel regimen (1 cap of MIRALAX DAILY, and 1 COLACE THREE TIMES PER DAY)  Compliance with this regimen is important to avoid constipation and straining  You are encouraged to walk around and climb steps, but you should not do any lifting  of anything heavier than 10 pounds for 6 weeks  A jug of milk weighs roughly 10 pounds, and as such do not lift anything heavier than the jug of milk  Please call us if you have questions or concerns  MD Jose Vasquez [facility] [phone]    Suprapubic Prostatectomy   WHAT YOU NEED TO KNOW:   Suprapubic prostatectomy is surgery to remove part or all of your prostate gland  Your prostate gland is found below your bladder and surrounds the top of your urethra   Your urethra is a tube that carries urine from your bladder to the outside of your body  You may need suprapubic prostatectomy if you have an enlarged prostate  DISCHARGE INSTRUCTIONS:   Call your local emergency number (911 in the [address]) for any of the following: You feel lightheaded, short of breath, and have chest pain  You cough up blood  Call your doctor or surgeon if:   Your leg feels warm, tender, and painful  It may look swollen and red  Blood soaks through your bandage  You feel the urge to urinate, but no urine comes out  You have pain in your lower back or abdomen that does not go away  Your scrotum becomes swollen  You have a fever or chills  Your wound is red, swollen, or draining pus  You have bright red blood in your urine, or your urine is cloudy and smells bad  Your urine stream becomes slower than normal, or you are urinating only small amounts  You have questions or concerns about your condition or care  Medicines:   Antibiotics  help prevent an infection  Prescription pain medicine  may be given  Ask your healthcare provider how to take this medicine safely  Some prescription pain medicines contain acetaminophen  Do not take other medicines that contain acetaminophen without talking to your healthcare provider  Too much acetaminophen may cause liver damage  Prescription pain medicine may cause constipation  Ask your healthcare provider how to prevent or treat constipation  Blood thinners  help prevent blood clots  Clots can cause strokes, heart attacks, and death  The following are general safety guidelines to follow while you are taking a blood thinner:    Watch for bleeding and bruising while you take blood thinners  Watch for bleeding from your gums or nose  Watch for blood in your urine and bowel movements  Use a soft washcloth on your skin, and a soft toothbrush to brush your teeth  This can keep your skin and gums from bleeding   If you shave, use an electric jc  Do not play contact sports  Tell your dentist and other healthcare providers that you take a blood thinner  Wear a bracelet or necklace that says you take this medicine  Do not start or stop any other medicines unless your healthcare provider tells you to  Many medicines cannot be used with blood thinners  Take your blood thinner exactly as prescribed by your healthcare provider  Do not skip does or take less than prescribed  Tell your provider right away if you forget to take your blood thinner, or if you take too much  Warfarin  is a blood thinner that you may need to take  The following are things you should be aware of if you take warfarin:     Foods and medicines can affect the amount of warfarin in your blood  Do not make major changes to your diet while you take warfarin  Warfarin works best when you eat about the same amount of vitamin K every day  Vitamin K is found in green leafy vegetables and certain other foods  Ask for more information about what to eat when you are taking warfarin  You will need to see your healthcare provider for follow-up visits when you are on warfarin  You will need regular blood tests  These tests are used to decide how much medicine you need  Take your medicine as directed  Contact your healthcare provider if you think your medicine is not helping or if you have side effects  Tell him or her if you are allergic to any medicine  Keep a list of the medicines, vitamins, and herbs you take  Include the amounts, and when and why you take them  Bring the list or the pill bottles to follow-up visits  Carry your medicine list with you in case of an emergency  [place] catheter care:  Keep the bag below your waist  If the bag is too high, urine will flow back into your bladder  This can cause an infection  Do not pull on the catheter  This may cause pain and bleeding, and the catheter may come out   Do not let the catheter tubing kink or twist  A kink or twist will block the flow of urine  Help decrease urine leakage:  After surgery, you may leak urine and have trouble controlling when you urinate  Ask for more information about the following ways to help decrease urine leakage:  Avoid caffeine  It can cause problems with bladder control and increase your need to urinate  Do pelvic floor muscle exercises  They may help improve your bladder control  These exercises are done by tightening and relaxing your pelvic muscles  Ask how to do pelvic floor muscle exercises, and how often to do them  Limit your liquid intake  Drink smaller amounts of liquid throughout the day  Do not drink before bedtime  Ask if you should decrease the amount of liquid you drink each day  This may help you control your bladder  Wear a pad or adult diapers  These may help absorb leaking urine and decrease odor  Return to your usual activities as directed:  Rest when you need to while you heal after surgery  Slowly start to do more each day  Return to your daily activities as directed  You may be able to return to your daily activities in 4 to 6 weeks  Follow up with your doctor or surgeon as directed: You may need to return to have your catheter removed  You may also need tests to check if you bladder empties completely when you urinate  Write down your questions so you remember to ask them during your visits  © Copyright [employer] 2022 Information is for End User's use only and may not be sold, redistributed or otherwise used for commercial purposes  All illustrations and images included in CareNotes® are the copyrighted property of [employer]  or Jonathan Arenas  The above information is an  only  It is not intended as medical advice for individual conditions or treatments  Talk to your doctor, nurse or pharmacist before following any medical regimen to see if it is safe and effective for you

## 2023-05-22 NOTE — OP NOTE
OPERATIVE REPORT  PATIENT NAME: Tigist Rey    :  1945  MRN: [id number]  Pt Location: [facility] 04    SURGERY DATE: 2023    Surgeon(s) and Role:     * Carri Hair MD - Primary     * Moon Swan PA-C - Assisting    Preop Diagnosis:  Benign prostatic hyperplasia with lower urinary tract symptoms, symptom details unspecified [N40 1]  5cm of Bladder stones       Post-Op Diagnosis Codes: * Benign prostatic hyperplasia with lower urinary tract symptoms, symptom details unspecified [N40 1]  5cm of bladder stones  Bowel adhesions    Procedure(s):  ROBOTIC ASSISTED SUB-TOTAL SUPRAPUBIC PROSTATECTOMY  Bladder stone evacuation  Lysis of adhesions    Specimen(s):  ID Type Source Tests Collected by Time Destination   1 : prostate adenoma Tissue Prostate TISSUE EXAM Carri Hair MD 2023 1631    A : bladder stones Calculus Urinary Bladder STONE ANALYSIS Carri Hair MD 2023 1634        Estimated Blood Loss:   300 mL    Drains:  Closed/Suction Drain LLQ Bulb 19 Fr  (Active)   Number of days: 0       NG/OG/Enteral Tube Orogastric 18 Fr (Active)   Number of days: 0       Urethral Catheter Latex; Three way 24 Fr  (Active)   Number of days: 0       [REMOVED] Urethral Catheter Latex; Three way 22 Fr  (Removed)   Number of days: 0       Anesthesia Type:   General    Operative Indications:  Patient bladder outlet obstruction associated a very large prostate and approximately 50 bladder stones with elevated PVR and voiding issues    Operative Findings:  50 bladder stones measuring 3- 8 mm in size evacuated into a bag later removed  Prostate with large median lobe resected  Good hemostasis obtained  Circumferential anastomosis performed  No evidence of leak when first layer bladder was closed    Complications:   None    Procedure and Technique:    The patient was brought to the operating room  Anesthesia was induced  He was given antibiotics in the form of Ancef    He was moved to dorsal lithotomy position  He was prepped and draped in standard sterile fashion  A time-out was performed identifying the correct patient site and procedure  A Mcgee catheter was placed into the bladder  A Veress needle was used in the midline above the umbilicus to gain access to the peritoneum which was confirmed with a drop test of saline  The abdomen was insufflated to a pressure 15 mmHg without issues  A 8 mm robotic trocar was placed at the midline 18 cm cephalad to pubic symphysis  A 30 camera was passed through the trocar  The abdomen was surveyed  The remainder of the port sites were mapped out and placed under direct vision after first anesthetizing each location with a mixture of bupivacaine with Exparel  This included 3 other robotic ports as well as a 12 mm AirSeal port and a 5 mm port  The placed was placed in steep Trendelenburg position  There were adhesions of bowel in the left lower quadrant right lower quadrant that would impede future placement of stay sutures of these were taken down sharply which required approximately 10 minutes of dissection  The bladder was distended with saline  He was then incised in the midline using cautery on the peritoneum and muscle and sharply incising the mucosa  The bladder wall was quite thick  It was opened for a length of approximately 12 cm  The balloon was taken down  There were approximately 50 tan-colored spherical bladder stones measuring 3 to 8 mm in size overtaking the floor the bladder  These were evacuated carefully into a bag  Next 2-0 viryl stay sutures were placed at 4 locations in the quadrants of the incised bladder to create a open working field inside the bladder  The ureteral orifices were identified  Attention was now turned to simple prostatectomy  The mucosa around the bladder neck was scored circumferentially    It was then entered and the prostate adenoma was dissected with a combination of sharp and blunt dissection with cautery as needed  A tenaculum was used through the 4th arm to help gain traction on the adenoma to facilitate visualization and dissection as we got deeper  Care was taken to ensure the correct tissue plane throughout dissection  There was moderate bleeding which was addressed as much as possible during dissection with spot cautery  As we got closer to removing the adenoma the catheter was advanced back into the bladder to ensure the urethra could be visualized  The urethra was transected and this freed the adenoma  It was removed from the bladder and placed in a specimen bag  Reinspection of the prostatic fossa suggested there was a small amount of residual apical prostate around the urethra  Therefore consideration was made for additional resection but I was concerned about  harming nearby pelvic muscle and external sphincter so decision was made to leave this tissue in place  Attention was now turned to obtaining hemostasis within the prostatic fossa  Using a combination of monopolar and bipolar energy bleeding points in the fossa were cauterized with good effect  Minimal cautery was used along the base of the prostate as some of the tissue did not look typical prostatic capsule tissue  Attention was now turned to mucosal advancement which was performed in a circumferential fashion using a 3-0 V loc and a 3-0 stratfix sutures advancing the bladder neck mucosa to the urethra with good effect  After 1/2 of the anastomosis was complete floseal was placed in the prostatic gutter on both sides  A new 24 French 3 way Mcgee catheter was then placed into the bladder without difficulty  Attention was turned to bladder closure which was performed in 2 layers 1st using a 3-0 V lock and then a 2-0 Stratafix  After the first layer of bladder was closed it was filled with approximately 200 cc of saline and did not show evidence of leak  The second layer was then closed   The bladder was drained and the catheter was hooked up to CBI  A drain was placed through the left lateral robotic port site  The 12 mm assistant port was removed and the area closed with an endo-close using 0 Vicryl suture  The robotic ports were then removed and the abdomen was desufflated  The midline incision was opened further and the specimen bag was extracted through this site  It was then closed using two 0 Vicryl sutures  All port sites were then closed superficially with 4-0 Monocryl suture and skin glue  The patient's urine remained a light pink on CBI  The patient was woken from anesthesia transferred to PACU in stable condition  A qualified resident physician was not available    Patient Disposition:  PACU       Plan:  The patient will be monitored overnight on CBI  If his CBI is able to be weaned off with appropriate labs than he will be discharged home with a catheter in place with plan for removal in 10-14 days after which the catheter will be removed  A qualified resident physician was not available      Patient Disposition:  PACU        SIGNATURE: Lissy Downing MD  DATE: May 22, 2023  TIME: 4:49 PM

## 2023-05-23 VITALS
HEART RATE: 51 BPM | BODY MASS INDEX: 23.1 KG/M2 | RESPIRATION RATE: 17 BRPM | WEIGHT: 180 LBS | OXYGEN SATURATION: 94 % | SYSTOLIC BLOOD PRESSURE: 149 MMHG | HEIGHT: 74 IN | DIASTOLIC BLOOD PRESSURE: 68 MMHG | TEMPERATURE: 98.4 F

## 2023-05-23 LAB
ANION GAP SERPL CALCULATED.3IONS-SCNC: 2 MMOL/L (ref 4–13)
BUN SERPL-MCNC: 13 MG/DL (ref 5–25)
CALCIUM SERPL-MCNC: 7.8 MG/DL (ref 8.4–10.2)
CHLORIDE SERPL-SCNC: 101 MMOL/L (ref 96–108)
CO2 SERPL-SCNC: 25 MMOL/L (ref 21–32)
CREAT SERPL-MCNC: 0.76 MG/DL (ref 0.6–1.3)
ERYTHROCYTE [DISTWIDTH] IN BLOOD BY AUTOMATED COUNT: 13.1 % (ref 11.6–15.1)
GFR SERPL CREATININE-BSD FRML MDRD: 88 ML/MIN/1.73SQ M
GLUCOSE P FAST SERPL-MCNC: 125 MG/DL (ref 65–99)
GLUCOSE SERPL-MCNC: 125 MG/DL (ref 65–140)
HCT VFR BLD AUTO: 32.8 % (ref 36.5–49.3)
HGB BLD-MCNC: 11.4 G/DL (ref 12–17)
MCH RBC QN AUTO: 30.8 PG (ref 26.8–34.3)
MCHC RBC AUTO-ENTMCNC: 34.8 G/DL (ref 31.4–37.4)
MCV RBC AUTO: 89 FL (ref 82–98)
PLATELET # BLD AUTO: 126 THOUSANDS/UL (ref 149–390)
PMV BLD AUTO: 10.6 FL (ref 8.9–12.7)
POTASSIUM SERPL-SCNC: 4.1 MMOL/L (ref 3.5–5.3)
RBC # BLD AUTO: 3.7 MILLION/UL (ref 3.88–5.62)
SODIUM SERPL-SCNC: 128 MMOL/L (ref 135–147)
WBC # BLD AUTO: 8.48 THOUSAND/UL (ref 4.31–10.16)

## 2023-05-23 RX ORDER — OXYBUTYNIN CHLORIDE 5 MG/1
5 TABLET ORAL AS NEEDED
Qty: 30 TABLET | Refills: 0 | Status: SHIPPED | OUTPATIENT
Start: 2023-05-23 | End: 2023-06-02

## 2023-05-23 RX ORDER — OXYCODONE HYDROCHLORIDE 5 MG/1
5 TABLET ORAL EVERY 4 HOURS PRN
Status: DISCONTINUED | OUTPATIENT
Start: 2023-05-23 | End: 2023-05-23 | Stop reason: HOSPADM

## 2023-05-23 RX ORDER — HYDROMORPHONE HCL/PF 1 MG/ML
0.5 SYRINGE (ML) INJECTION ONCE
Status: COMPLETED | OUTPATIENT
Start: 2023-05-23 | End: 2023-05-23

## 2023-05-23 RX ORDER — POLYETHYLENE GLYCOL 3350 17 G/17G
17 POWDER, FOR SOLUTION ORAL DAILY
Qty: 119 G | Refills: 0 | Status: SHIPPED | OUTPATIENT
Start: 2023-05-23 | End: 2023-05-31

## 2023-05-23 RX ORDER — OXYCODONE HYDROCHLORIDE 5 MG/1
5 TABLET ORAL EVERY 4 HOURS PRN
Qty: 10 TABLET | Refills: 0 | Status: SHIPPED | OUTPATIENT
Start: 2023-05-23

## 2023-05-23 RX ORDER — DOCUSATE SODIUM 100 MG/1
100 CAPSULE, LIQUID FILLED ORAL 3 TIMES DAILY PRN
Qty: 21 CAPSULE | Refills: 0 | Status: SHIPPED | OUTPATIENT
Start: 2023-05-23 | End: 2023-05-31

## 2023-05-23 RX ADMIN — HEPARIN SODIUM 5000 UNITS: 5000 INJECTION INTRAVENOUS; SUBCUTANEOUS at 06:43

## 2023-05-23 RX ADMIN — DOCUSATE SODIUM 100 MG: 100 CAPSULE, LIQUID FILLED ORAL at 08:34

## 2023-05-23 RX ADMIN — HYDROMORPHONE HYDROCHLORIDE 0.5 MG: 1 INJECTION, SOLUTION INTRAMUSCULAR; INTRAVENOUS; SUBCUTANEOUS at 03:57

## 2023-05-23 RX ADMIN — ACETAMINOPHEN 650 MG: 325 TABLET ORAL at 12:48

## 2023-05-23 RX ADMIN — SENNOSIDES 8.6 MG: 8.6 TABLET, FILM COATED ORAL at 08:34

## 2023-05-23 RX ADMIN — OXYBUTYNIN CHLORIDE 5 MG: 5 TABLET ORAL at 08:35

## 2023-05-23 RX ADMIN — CEFAZOLIN SODIUM 1000 MG: 1 SOLUTION INTRAVENOUS at 03:07

## 2023-05-23 RX ADMIN — SODIUM CHLORIDE, SODIUM LACTATE, POTASSIUM CHLORIDE, AND CALCIUM CHLORIDE 100 ML/HR: .6; .31; .03; .02 INJECTION, SOLUTION INTRAVENOUS at 06:41

## 2023-05-23 RX ADMIN — ACETAMINOPHEN 650 MG: 325 TABLET ORAL at 06:43

## 2023-05-23 RX ADMIN — BACITRACIN ZINC, NEOMYCIN, POLYMYXIN B 1 LARGE APPLICATION: 400; 3.5; 5 OINTMENT TOPICAL at 08:37

## 2023-05-23 RX ADMIN — FINASTERIDE 5 MG: 5 TABLET, FILM COATED ORAL at 08:35

## 2023-05-23 RX ADMIN — HEPARIN SODIUM 5000 UNITS: 5000 INJECTION INTRAVENOUS; SUBCUTANEOUS at 12:48

## 2023-05-23 NOTE — PROGRESS NOTES
"Progress Note - Urology  [facility] 1945, 70 y o  male MRN: [id number]    Unit/Bed#: S -01 Encounter: [id number]      Assessment & Plan:  1  BPH with elevated PSA  -POD 1 robotic assisted subtotal suprapubic prostatectomy, bladder stone evacuation, lysis of adhesions  with Dr Ross Nixon  -Vital signs stable, afebrile  -Hemoglobin 11 4, yesterday 12 7, stable  -Sodium 128  Patient was on clear liquid diet  Advance diet  Not passing gas  No nausea or vomiting  Abdomen soft, nondistended  Minimal incisional tenderness   - Patient reports pain, RLQ, suprapubic  Relieved with pain regimen    -Fluids DC'd  -CBI clamped at 8 AM   Assessed urine clear yellow at 12 PM   -Ambulated with patient in the hallway without difficulty  -24-hour urine output 1220  -NICOLE drain output 35 mL serosanguineous fluid  Will remove prior to discharge   -Will reassess in afternoon prior to discharge     Subjective:   HPI: Patient seen at bedside  Reports overnight he did not sleep due to pain and difficulty sleeping in the hospital   Tolerating normal diet, no nausea or vomiting  Denies any fever, chills, shortness of breath, chest pain  Review of Systems   Constitutional: Negative for chills and fever  Respiratory: Negative for cough and shortness of breath  Cardiovascular: Negative for chest pain and palpitations  Gastrointestinal: Positive for abdominal pain  Negative for vomiting  Genitourinary: Negative for dysuria, flank pain and hematuria  Musculoskeletal: Negative for arthralgias and back pain  Skin: Negative for color change and rash  Neurological: Negative for seizures and syncope  All other systems reviewed and are negative  Objective:    Vitals: Blood pressure 158/82, pulse (!) 52, temperature 98 6 °F (37 °C), temperature source Axillary, resp  rate 18, height 6' 2\" (1 88 m), weight 81 6 kg (180 lb), SpO2 96 %  ,Body mass index is 23 11 kg/m²      Physical Exam  Constitutional:       General: " He is not in acute distress  Appearance: Normal appearance  He is normal weight  He is not ill-appearing or toxic-appearing  HENT:      Head: Normocephalic and atraumatic  Right Ear: External ear normal       Left Ear: External ear normal       Nose: Nose normal       Mouth/Throat:      Mouth: Mucous membranes are moist    Eyes:      General: No scleral icterus  Conjunctiva/sclera: Conjunctivae normal    Cardiovascular:      Rate and Rhythm: Normal rate and regular rhythm  Pulses: Normal pulses  Heart sounds: Normal heart sounds  No murmur heard  No friction rub  No gallop  Pulmonary:      Effort: Pulmonary effort is normal  No respiratory distress  Breath sounds: Normal breath sounds  No stridor  No wheezing, rhonchi or rales  Abdominal:      Comments: Abdomen soft, nondistended  Minimal incisional tenderness  RLQ/ suprapubic tenderness  Incisions clean, dry, no erythema, drainage, swelling  NICOLE drain in place draining serosanguineous fluid  Genitourinary:     Comments: Mcgee catheter in place draining clear yellow urine  Musculoskeletal:         General: Normal range of motion  Cervical back: Normal range of motion  Skin:     General: Skin is warm  Findings: No rash  Neurological:      General: No focal deficit present  Mental Status: He is alert and oriented to person, place, and time  Mental status is at baseline  Psychiatric:         Mood and Affect: Mood normal          Behavior: Behavior normal          Thought Content:  Thought content normal          Judgment: Judgment normal          Labs:  Recent Labs     05/23/23  0638   WBC 8 48     Recent Labs     05/22/23 2241 05/23/23  0638   HGB 12 7 11 4*     Recent Labs     05/22/23 2241 05/23/23  0638   HCT 36 5 32 8*     Recent Labs     05/22/23 2241 05/23/23  0638   CREATININE 0 90 0 76         History:  Past Medical History:   Diagnosis Date   • Bladder stones    • BPH (benign prostatic hyperplasia)    • Coronary artery disease    • Diverticulosis    • Myocardial infarction ([facility] [address] )    • Pink eye      Social History     Socioeconomic History   • Marital status: /Civil Union     Spouse name: None   • Number of children: None   • Years of education: None   • Highest education level: None   Occupational History   • None   Tobacco Use   • Smoking status: Never   • Smokeless tobacco: Never   Vaping Use   • Vaping Use: Never used   Substance and Sexual Activity   • Alcohol use: Yes     Comment: social   • Drug use: No   • Sexual activity: Not Currently     Partners: Female   Other Topics Concern   • None   Social History Narrative   • None     Social Determinants of Health     Financial Resource Strain: Low Risk    • Difficulty of Paying Living Expenses: Not hard at all   Food Insecurity: Not on file   Transportation Needs: No Transportation Needs   • Lack of Transportation (Medical): No   • Lack of Transportation (Non-Medical):  No   Physical Activity: Not on file   Stress: Not on file   Social Connections: Not on file   Intimate Partner Violence: Not on file   Housing Stability: Not on file     Past Surgical History:   Procedure Laterality Date   • AORTIC VALVE SURGERY      Assessment   • CORONARY ANGIOPLASTY WITH STENT PLACEMENT     • CYSTOSCOPY  01/23/2014    Diagnostic   • JOINT REPLACEMENT Right    • KNEE ARTHROSCOPY     • PROSTATE BIOPSY  01/23/2014   • PROSTATECTOMY N/A 5/22/2023    Procedure: ROBOTIC ASSISTED SUB-TOTAL SUPRAPUBIC PROSTATECTOMY;  Surgeon: Salma Marino MD;  Location: [facility];  Service: Urology   • REPLACEMENT TOTAL KNEE     • REPLACEMENT TOTAL KNEE       Family History   Problem Relation Age of Onset   • Other Father         Cardiac Disorder       Charlcie Galeazzi, PA-C  Date: 5/23/2023 Time: 10:09 AM

## 2023-05-23 NOTE — DISCHARGE SUMMARY
Discharge Summary - Real Easley 70 y o  male MRN: [id number]    Unit/Bed#: S -01 Encounter: [id number]    Admission Date: 5/22/2023     Discharge Date: 05/23/23    HPI: Real Easley is a 70 y o  male who presented to the urology clinic for BPH with lower urinary tract symptoms  Patient with elevated PSA, underwent prostate biopsy and MP MRI prostate, negative  His PSA has remained stable  Patient underwent cystoscopy to evaluate bladder outlet  Patient met with Dr Darlin Ramos to review his outlet obstruction not responding to medical therapy to review surgical options  Patient and Dr Darlin Ramos reviewed risks benefits of robotic simple prostatectomy and patient elected to proceed  Patient presented on 5/22/2023 for robotic subtotal suprapubic prostatectomy  Procedure(s):  ROBOTIC ASSISTED SUB-TOTAL SUPRAPUBIC PROSTATECTOMY  Surgeon(s):  MD Love Garcia PA-C  5/22/2023    Hospital Course: Patient presented on 5/22/2023 for robotic subtotal suprapubic prostatectomy, evacuation of bladder stones, lysis of adhesions with Dr Darlin Ramos  The procedure went as planned  There were no complications  See op note for more details  Postop day 1 patient was progressing well  Vital signs stable, afebrile  CBI was clamped at 8 AM   Urine at 12 PM clear yellow  Patient ambulating in the hallway without difficulty  Labs remained stable  Patient with low sodium in the morning, patient had been on a clear liquid diet, diet advanced  Patient tolerating normal diet without difficulty  No nausea or vomiting  No fever, chills, shortness of breath, chest pain  Abdomen soft, nondistended, minimal tenderness  NICOLE drain with minimal output overnight 35 mL, NICOLE drain was removed  In the afternoon Mcgee catheter was reassessed, grossly clear, slight pink-tinged urine  Patient clear on 5/23/2023 for discharge home      Discharge Diagnosis: Benign prostatic hyperplasia with elevated prostate specific antigen (PSA)    Condition at Discharge: good    Discharge Medications:  See after visit summary for reconciled discharge medications provided to patient and family  Patient was discharged home on home medications with the addition of MiraLAX, Colace, oxybutynin, oxycodone  Discharge instructions/Information to patient and family:   See after visit summary for written and verbal information which has been provided to patient and family  Provisions for Follow-Up Care:  See after visit summary for information related to follow-up care and any pertinent home health orders  Disposition: Home    Planned Readmission: No    Discharge Statement   I spent 20 minutes discharging the patient  This time was spent on the day of discharge  I had direct contact with the patient on the day of discharge  Additional documentation is required if more than 30 minutes were spent on discharge       Signature:   Manoj Sandoval PA-C  Date: 5/23/2023 Time: 3:02 PM

## 2023-05-24 ENCOUNTER — TELEPHONE (OUTPATIENT)
Dept: UROLOGY | Facility: CLINIC | Age: 78
End: 2023-05-24

## 2023-05-24 DIAGNOSIS — R97.20 BENIGN PROSTATIC HYPERPLASIA WITH ELEVATED PROSTATE SPECIFIC ANTIGEN (PSA): Primary | ICD-10-CM

## 2023-05-24 DIAGNOSIS — N40.0 BENIGN PROSTATIC HYPERPLASIA WITH ELEVATED PROSTATE SPECIFIC ANTIGEN (PSA): Primary | ICD-10-CM

## 2023-05-24 LAB
ABO GROUP BLD BPU: NORMAL
ABO GROUP BLD BPU: NORMAL
BPU ID: NORMAL
BPU ID: NORMAL
CROSSMATCH: NORMAL
CROSSMATCH: NORMAL
UNIT DISPENSE STATUS: NORMAL
UNIT DISPENSE STATUS: NORMAL
UNIT PRODUCT CODE: NORMAL
UNIT PRODUCT CODE: NORMAL
UNIT PRODUCT VOLUME: 350 ML
UNIT PRODUCT VOLUME: 350 ML
UNIT RH: NORMAL
UNIT RH: NORMAL

## 2023-05-24 RX ORDER — CIPROFLOXACIN 500 MG/1
500 TABLET, FILM COATED ORAL EVERY 12 HOURS SCHEDULED
Qty: 6 TABLET | Refills: 0 | Status: SHIPPED | OUTPATIENT
Start: 2023-05-24 | End: 2023-05-27

## 2023-05-24 NOTE — TELEPHONE ENCOUNTER
Post Op Note    Real Easley is a 70 y o  male s/p ROBOTIC ASSISTED SUB-TOTAL SUPRAPUBIC PROSTATECTOMY  performed 5/22/23  Real Easley is a patient of Dr Darlin Ramos and is seen at the [place] office  How would you rate your pain on a scale from 1 to 10, 10 being the worst pain ever? 2 Patient states he is sore, but nothing intolerable  Does report having gas, which he is aware is notrmal post surgery  Have you had a fever? No  Have your bowel movements been regular? No  If the patient has an incision- do you have any redness around the incision or any drainage from the incision? No  If the patient has a navarro- are you comfortable caring for your navarro? Yes Is it draining urine? Yes  Do you have any other questions or concerns that I can address at this time? Made aware of post op appt with Dr Darlin Ramos on 6/1/23  His [facility] @ 0830 [facility]  Patient states he was not given any antibiotic script when he was discharged yesterday    Should he be ordered one prior to Navarro removal?

## 2023-05-26 LAB
COLOR STONE: NORMAL
COM MFR STONE: 20 %
COMMENT-STONE3: NORMAL
COMPOSITION: NORMAL
LABORATORY COMMENT REPORT: NORMAL
PHOTO: NORMAL
SIZE STONE: NORMAL MM
SPEC SOURCE SUBJ: NORMAL
STONE ANALYSIS-IMP: NORMAL
URATE MFR STONE: 80 %
WT STONE: 700 MG

## 2023-05-27 ENCOUNTER — HOSPITAL ENCOUNTER (EMERGENCY)
Facility: HOSPITAL | Age: 78
Discharge: HOME | DRG: 729 | End: 2023-05-28
Attending: EMERGENCY MEDICINE
Payer: MEDICARE

## 2023-05-27 ENCOUNTER — NURSE TRIAGE (OUTPATIENT)
Dept: OTHER | Facility: OTHER | Age: 78
End: 2023-05-27

## 2023-05-27 DIAGNOSIS — E87.1 HYPONATREMIA: ICD-10-CM

## 2023-05-27 DIAGNOSIS — N39.0 UTI (URINARY TRACT INFECTION): Primary | ICD-10-CM

## 2023-05-27 DIAGNOSIS — I10 HYPERTENSION: ICD-10-CM

## 2023-05-27 LAB
BASOPHILS # BLD AUTO: 0.02 THOUSANDS/ÂΜL (ref 0–0.1)
BASOPHILS NFR BLD AUTO: 0 % (ref 0–1)
EOSINOPHIL # BLD AUTO: 0.13 THOUSAND/ÂΜL (ref 0–0.61)
EOSINOPHIL NFR BLD AUTO: 2 % (ref 0–6)
ERYTHROCYTE [DISTWIDTH] IN BLOOD BY AUTOMATED COUNT: 13.3 % (ref 11.6–15.1)
HCT VFR BLD AUTO: 34.3 % (ref 36.5–49.3)
HGB BLD-MCNC: 11.4 G/DL (ref 12–17)
IMM GRANULOCYTES # BLD AUTO: 0.1 THOUSAND/UL (ref 0–0.2)
IMM GRANULOCYTES NFR BLD AUTO: 2 % (ref 0–2)
LYMPHOCYTES # BLD AUTO: 1.37 THOUSANDS/ÂΜL (ref 0.6–4.47)
LYMPHOCYTES NFR BLD AUTO: 23 % (ref 14–44)
MCH RBC QN AUTO: 29.7 PG (ref 26.8–34.3)
MCHC RBC AUTO-ENTMCNC: 33.2 G/DL (ref 31.4–37.4)
MCV RBC AUTO: 89 FL (ref 82–98)
MONOCYTES # BLD AUTO: 0.52 THOUSAND/ÂΜL (ref 0.17–1.22)
MONOCYTES NFR BLD AUTO: 9 % (ref 4–12)
NEUTROPHILS # BLD AUTO: 3.88 THOUSANDS/ÂΜL (ref 1.85–7.62)
NEUTS SEG NFR BLD AUTO: 64 % (ref 43–75)
NRBC BLD AUTO-RTO: 0 /100 WBCS
PLATELET # BLD AUTO: 162 THOUSANDS/UL (ref 149–390)
PMV BLD AUTO: 10.2 FL (ref 8.9–12.7)
RBC # BLD AUTO: 3.84 MILLION/UL (ref 3.88–5.62)
WBC # BLD AUTO: 6.02 THOUSAND/UL (ref 4.31–10.16)

## 2023-05-27 PROCEDURE — 99285 EMERGENCY DEPT VISIT HI MDM: CPT

## 2023-05-27 PROCEDURE — 87040 BLOOD CULTURE FOR BACTERIA: CPT

## 2023-05-27 PROCEDURE — 83605 ASSAY OF LACTIC ACID: CPT

## 2023-05-27 PROCEDURE — 36415 COLL VENOUS BLD VENIPUNCTURE: CPT

## 2023-05-27 PROCEDURE — 85610 PROTHROMBIN TIME: CPT

## 2023-05-27 PROCEDURE — 80053 COMPREHEN METABOLIC PANEL: CPT

## 2023-05-27 PROCEDURE — 84145 PROCALCITONIN (PCT): CPT

## 2023-05-27 PROCEDURE — 93005 ELECTROCARDIOGRAM TRACING: CPT

## 2023-05-27 PROCEDURE — 85025 COMPLETE CBC W/AUTO DIFF WBC: CPT

## 2023-05-27 PROCEDURE — 99285 EMERGENCY DEPT VISIT HI MDM: CPT | Performed by: EMERGENCY MEDICINE

## 2023-05-27 PROCEDURE — 85730 THROMBOPLASTIN TIME PARTIAL: CPT

## 2023-05-27 NOTE — TELEPHONE ENCOUNTER
S/P ROBOTIC ASSISTED SUB-TOTAL SUPRAPUBIC PROSTATECTOMY (Abdomen) on 5/22 with Louis Blackwood MD  c/o new onset fever ranging from 100-101 (forehead), with most recent at 100 9  Denies taking antipyretic as of now  No additional symptoms  On call Provider contacted and informed of patient’s concerns and status  Provider recommends proceeding to ED for evaluation  Patient informed of provider’s recommendation, along with care advice  Verbalized understanding and agreeable with disposition  No further questions

## 2023-05-27 NOTE — TELEPHONE ENCOUNTER
"  Reason for Disposition  • Fever > 100 4 F (38 0 C)    Answer Assessment - Initial Assessment Questions  1  SYMPTOM: \"What's the main symptom you're concerned about? \" (e g , pain, fever, vomiting)     Fever 100-101 (forehead) 100 9 (recent)    2  ONSET: \"When did it start? \"    5/27  3  SURGERY: \"What surgery was performed? \"      ROBOTIC ASSISTED SUB-TOTAL SUPRAPUBIC PROSTATECTOMY   4  DATE of SURGERY: \"When was surgery performed? \"      5/22  5  ANESTHESIA: \" What type of anesthesia did you have? \" (e g , general, spinal, epidural, local)     General   6  PAIN: \"Is there any pain? \" If Yes, ask: \"How bad is it? \"  (Scale 1-10; or mild, moderate, severe)      Mild discomfort   7  FEVER: \"Do you have a fever? \" If Yes, ask: \"What is your temperature, how was it measured, and when did it start? \"    100-101  8  VOMITING: \"Is there any vomiting? \" If yes, ask: \"How many times? \"  Denies   9  BLEEDING: \"Is there any bleeding? \" If Yes, ask: \"How much? \" and \"Where? \"     denies   10  OTHER SYMPTOMS: \"Do you have any other symptoms? \" (e g , drainage from wound, painful urination, constipation)      denies    Protocols used: POST-OP SYMPTOMS AND QUESTIONS-ADULT-    "

## 2023-05-27 NOTE — TELEPHONE ENCOUNTER
"Regarding: post-op fever  ----- Message from Malka Lima sent at 5/27/2023  6:45 PM EDT -----  \" I had a suprapubic prostatectomy and now I have a fever  \"    "

## 2023-05-28 VITALS
HEIGHT: 74 IN | DIASTOLIC BLOOD PRESSURE: 90 MMHG | WEIGHT: 180 LBS | BODY MASS INDEX: 23.1 KG/M2 | RESPIRATION RATE: 18 BRPM | TEMPERATURE: 98.3 F | HEART RATE: 62 BPM | SYSTOLIC BLOOD PRESSURE: 174 MMHG | OXYGEN SATURATION: 99 %

## 2023-05-28 LAB
ALBUMIN SERPL BCP-MCNC: 3.7 G/DL (ref 3.5–5)
ALP SERPL-CCNC: 41 U/L (ref 34–104)
ALT SERPL W P-5'-P-CCNC: 23 U/L (ref 7–52)
ANION GAP SERPL CALCULATED.3IONS-SCNC: 7 MMOL/L (ref 4–13)
APTT PPP: 29 SECONDS (ref 23–37)
AST SERPL W P-5'-P-CCNC: 23 U/L (ref 13–39)
ATRIAL RATE: 55 BPM
BACTERIA UR QL AUTO: ABNORMAL /HPF
BILIRUB SERPL-MCNC: 0.41 MG/DL (ref 0.2–1)
BILIRUB UR QL STRIP: NEGATIVE
BUN SERPL-MCNC: 12 MG/DL (ref 5–25)
CALCIUM SERPL-MCNC: 8.8 MG/DL (ref 8.4–10.2)
CHLORIDE SERPL-SCNC: 97 MMOL/L (ref 96–108)
CLARITY UR: CLEAR
CO2 SERPL-SCNC: 27 MMOL/L (ref 21–32)
COLOR UR: COLORLESS
CREAT SERPL-MCNC: 0.67 MG/DL (ref 0.6–1.3)
GFR SERPL CREATININE-BSD FRML MDRD: 93 ML/MIN/1.73SQ M
GLUCOSE SERPL-MCNC: 99 MG/DL (ref 65–140)
GLUCOSE UR STRIP-MCNC: NEGATIVE MG/DL
HGB UR QL STRIP.AUTO: ABNORMAL
INR PPP: 1.07 (ref 0.84–1.19)
KETONES UR STRIP-MCNC: NEGATIVE MG/DL
LACTATE SERPL-SCNC: 1.1 MMOL/L (ref 0.5–2)
LEUKOCYTE ESTERASE UR QL STRIP: ABNORMAL
MUCOUS THREADS UR QL AUTO: ABNORMAL
NITRITE UR QL STRIP: NEGATIVE
NON-SQ EPI CELLS URNS QL MICRO: ABNORMAL /HPF
P AXIS: 68 DEGREES
PH UR STRIP.AUTO: 7.5 [PH]
POTASSIUM SERPL-SCNC: 4.1 MMOL/L (ref 3.5–5.3)
PR INTERVAL: 218 MS
PROCALCITONIN SERPL-MCNC: <0.05 NG/ML
PROT SERPL-MCNC: 6.3 G/DL (ref 6.4–8.4)
PROT UR STRIP-MCNC: NEGATIVE MG/DL
PROTHROMBIN TIME: 14.2 SECONDS (ref 11.6–14.5)
QRS AXIS: -66 DEGREES
QRSD INTERVAL: 112 MS
QT INTERVAL: 434 MS
QTC INTERVAL: 415 MS
RBC #/AREA URNS AUTO: ABNORMAL /HPF
SODIUM SERPL-SCNC: 131 MMOL/L (ref 135–147)
SP GR UR STRIP.AUTO: 1 (ref 1–1.03)
T WAVE AXIS: 37 DEGREES
UROBILINOGEN UR STRIP-ACNC: <2 MG/DL
VENTRICULAR RATE: 55 BPM
WBC #/AREA URNS AUTO: ABNORMAL /HPF

## 2023-05-28 PROCEDURE — 93010 ELECTROCARDIOGRAM REPORT: CPT | Performed by: INTERNAL MEDICINE

## 2023-05-28 PROCEDURE — 87086 URINE CULTURE/COLONY COUNT: CPT

## 2023-05-28 PROCEDURE — 96361 HYDRATE IV INFUSION ADD-ON: CPT

## 2023-05-28 PROCEDURE — 81001 URINALYSIS AUTO W/SCOPE: CPT

## 2023-05-28 PROCEDURE — 96374 THER/PROPH/DIAG INJ IV PUSH: CPT

## 2023-05-28 RX ORDER — HYDRALAZINE HYDROCHLORIDE 20 MG/ML
10 INJECTION INTRAMUSCULAR; INTRAVENOUS ONCE
Status: COMPLETED | OUTPATIENT
Start: 2023-05-28 | End: 2023-05-28

## 2023-05-28 RX ORDER — CEPHALEXIN 500 MG/1
500 CAPSULE ORAL 4 TIMES DAILY
Qty: 28 CAPSULE | Refills: 0 | Status: SHIPPED | OUTPATIENT
Start: 2023-05-28 | End: 2023-06-04

## 2023-05-28 RX ORDER — CEPHALEXIN 250 MG/1
500 CAPSULE ORAL ONCE
Status: COMPLETED | OUTPATIENT
Start: 2023-05-28 | End: 2023-05-28

## 2023-05-28 RX ADMIN — SODIUM CHLORIDE 500 ML: 0.9 INJECTION, SOLUTION INTRAVENOUS at 00:47

## 2023-05-28 RX ADMIN — HYDRALAZINE HYDROCHLORIDE 10 MG: 20 INJECTION, SOLUTION INTRAMUSCULAR; INTRAVENOUS at 00:47

## 2023-05-28 RX ADMIN — CEPHALEXIN 500 MG: 250 CAPSULE ORAL at 01:24

## 2023-05-28 NOTE — ED PROVIDER NOTES
"History  Chief Complaint   Patient presents with   • Post-op Problem     Pt had a recent subtotal prostatectomy  States felt as if he felt \"warm\"  Denies complications from recent surgery   -Fever -chills -bodyaches      Williams Atkins is a 70year old male with recent prostatectomy 5 days ago presenting for evaluation of a recorded fever of 101 degrees at home  Patient described that he used a thermal gun to check his temperature when he felt \"warm\" at home  Patient rechecked his temperature with an oral thermometer and was normothermic with that reading  He contacted his urologist who recommended that he come to the emergency department for evaluation nonetheless  Patient denies any chills, nausea, vomiting  He denies any worsening abdominal tenderness since his procedure, notes mild postoperative tenderness that has been improving  He has a Navarro catheter in place, his Navarro catheter is continued to drain appropriately  He denies any diarrhea, bloody stools  No cough or congestion  Patient states that he feels otherwise at his baseline currently  History provided by:  Patient   used: No        Prior to Admission Medications   Prescriptions Last Dose Informant Patient Reported? Taking?    ALPRAZolam (XANAX) 0 25 mg tablet  Self No No   Sig: Take 1 tablet (0 25 mg total) by mouth 3 (three) times a day   Patient taking differently: Take 0 25 mg by mouth 3 (three) times a day as needed   aspirin 81 MG tablet  Self Yes No   Sig: Take by mouth every other day   ciprofloxacin (CIPRO) 500 mg tablet   No No   Sig: Take 1 tablet (500 mg total) by mouth every 12 (twelve) hours for 3 days Begin first dose 1 day prior to navarro removal   docusate sodium (COLACE) 100 mg capsule   No No   Sig: Take 1 capsule (100 mg total) by mouth 3 (three) times a day as needed for constipation for up to 7 days   oxyCODONE (Roxicodone) 5 immediate release tablet   No No   Sig: Take 1 tablet (5 mg total) by mouth every " 4 (four) hours as needed for moderate pain for up to 10 doses Max Daily Amount: 30 mg   oxybutynin (DITROPAN) 5 mg tablet   No No   Sig: Take 1 tablet (5 mg total) by mouth if needed (TID PRN) for up to 10 days   phenazopyridine (PYRIDIUM) 100 mg tablet  Self Yes No   Sig: TAKE 1-2 TABLETS (100-200 MG TOTAL) BY MOUTH 3 TIMES A DAY AS NEEDED NOT COV   polyethylene glycol (MIRALAX) 17 g packet   No No   Sig: Take 17 g by mouth daily for 7 days   simvastatin (ZOCOR) 20 mg tablet  Self No No   Sig: Take 1 tablet (20 mg total) by mouth daily at bedtime   terazosin (HYTRIN) 5 mg capsule  Self No No   Sig: TAKE 1 CAPSULE EVERY DAY   Patient taking differently: Take 5 mg by mouth daily at bedtime      Facility-Administered Medications: None       Past Medical History:   Diagnosis Date   • Bladder stones    • BPH (benign prostatic hyperplasia)    • Coronary artery disease    • Diverticulosis    • Myocardial infarction (HCC)    • Pink eye        Past Surgical History:   Procedure Laterality Date   • AORTIC VALVE SURGERY      Assessment   • CORONARY ANGIOPLASTY WITH STENT PLACEMENT     • CYSTOSCOPY  01/23/2014    Diagnostic   • JOINT REPLACEMENT Right    • KNEE ARTHROSCOPY     • PROSTATE BIOPSY  01/23/2014   • PROSTATECTOMY N/A 5/22/2023    Procedure: ROBOTIC ASSISTED SUB-TOTAL SUPRAPUBIC PROSTATECTOMY;  Surgeon: Gloria Amin MD;  Location: [facility];  Service: Urology   • REPLACEMENT TOTAL KNEE     • REPLACEMENT TOTAL KNEE         Family History   Problem Relation Age of Onset   • Other Father         Cardiac Disorder     I have reviewed and agree with the history as documented  E-Cigarette/Vaping   • E-Cigarette Use Never User      E-Cigarette/Vaping Substances   • Nicotine No    • THC No    • CBD No    • Flavoring No    • Other No    • Unknown No      Social History     Tobacco Use   • Smoking status: Never   • Smokeless tobacco: Never   Vaping Use   • Vaping Use: Never used   Substance Use Topics   • Alcohol use:  Yes Comment: social   • Drug use: No        Review of Systems   Constitutional: Positive for fever  Negative for chills  HENT: Negative for ear pain and sore throat  Eyes: Negative for pain and visual disturbance  Respiratory: Negative for cough and shortness of breath  Cardiovascular: Negative for chest pain and palpitations  Gastrointestinal: Negative for abdominal pain and vomiting  Genitourinary: Negative for dysuria and hematuria  Musculoskeletal: Negative for arthralgias and back pain  Skin: Negative for color change and rash  Neurological: Negative for seizures and syncope  All other systems reviewed and are negative  Physical Exam  ED Triage Vitals   Temperature Pulse Respirations Blood Pressure SpO2   05/27/23 2103 05/27/23 2103 05/27/23 2103 05/27/23 2103 05/27/23 2103   98 3 °F (36 8 °C) 58 18 (!) 216/84 96 %      Temp Source Heart Rate Source Patient Position - Orthostatic VS BP Location FiO2 (%)   05/27/23 2103 05/27/23 2103 05/27/23 2103 05/27/23 2103 --   Oral Monitor Sitting Right arm       Pain Score       05/27/23 2229       No Pain             Orthostatic Vital Signs  Vitals:    05/28/23 0015 05/28/23 0030 05/28/23 0100 05/28/23 0122   BP: (!) 178/83 (!) 202/88 (!) 184/86 (!) 174/90   Pulse: (!) 52 55 62    Patient Position - Orthostatic VS:  Sitting Sitting        Physical Exam  Vitals and nursing note reviewed  Constitutional:       General: He is not in acute distress  Appearance: Normal appearance  He is not ill-appearing  HENT:      Head: Normocephalic and atraumatic  Right Ear: External ear normal       Left Ear: External ear normal       Mouth/Throat:      Mouth: Mucous membranes are moist       Pharynx: Oropharynx is clear  Eyes:      General: No scleral icterus  Conjunctiva/sclera: Conjunctivae normal    Cardiovascular:      Rate and Rhythm: Normal rate and regular rhythm  Pulses: Normal pulses  Heart sounds: Normal heart sounds  Pulmonary:      Effort: Pulmonary effort is normal  No respiratory distress  Breath sounds: Normal breath sounds  Abdominal:      General: Abdomen is flat  There is no distension  Palpations: Abdomen is soft  Tenderness: There is abdominal tenderness in the suprapubic area  There is no guarding or rebound  Hernia: No hernia is present  Comments: Mild suprapubic abdominal tenderness palpation, patient says that his tenderness has been decreasing since his prostatectomy 5 days ago   Musculoskeletal:         General: No tenderness or signs of injury  Cervical back: Neck supple  No rigidity  Skin:     General: Skin is warm  Coloration: Skin is not jaundiced  Findings: No erythema or rash  Neurological:      General: No focal deficit present  Mental Status: He is alert  Mental status is at baseline  Psychiatric:         Mood and Affect: Mood normal          Behavior: Behavior normal          ED Medications  Medications   hydrALAZINE (APRESOLINE) injection 10 mg (10 mg Intravenous Given 5/28/23 0047)   sodium chloride 0 9 % bolus 500 mL (0 mL Intravenous Stopped 5/28/23 0124)   cephalexin (KEFLEX) capsule 500 mg (500 mg Oral Given 5/28/23 0124)       Diagnostic Studies  Results Reviewed     Procedure Component Value Units Date/Time    Urine Microscopic [[id number]]  (Abnormal) Collected: 05/28/23 0021    Lab Status: Final result Specimen: Urine, Indwelling Mcgee Catheter Updated: 05/28/23 0045     RBC, UA 10-20 /hpf      WBC, UA 10-20 /hpf      Epithelial Cells Occasional /hpf      Bacteria, UA Occasional /hpf      MUCUS THREADS Occasional    Urine culture [[id number]] Collected: 05/28/23 0021    Lab Status:  In process Specimen: Urine, Indwelling Mcgee Catheter Updated: 05/28/23 0045    UA w Reflex to Microscopic w Reflex to Culture [[id number]]  (Abnormal) Collected: 05/28/23 0021    Lab Status: Final result Specimen: Urine, Indwelling Mcgee Catheter Updated: 05/28/23 0040     Color, UA Colorless     Clarity, UA Clear     Specific [place], UA 1 005     pH, UA 7 5     Leukocytes, UA Moderate     Nitrite, UA Negative     Protein, UA Negative mg/dl      Glucose, UA Negative mg/dl      Ketones, UA Negative mg/dl      Urobilinogen, UA <2 0 mg/dl      Bilirubin, UA Negative     Occult Blood, UA Large    Procalcitonin [[id number]]  (Normal) Collected: 05/27/23 2335    Lab Status: Final result Specimen: Blood from Arm, Right Updated: 05/28/23 0035     Procalcitonin <0 05 ng/ml     Comprehensive metabolic panel [[id number]]  (Abnormal) Collected: 05/27/23 2335    Lab Status: Final result Specimen: Blood from Arm, Right Updated: 05/28/23 0025     Sodium 131 mmol/L      Potassium 4 1 mmol/L      Chloride 97 mmol/L      CO2 27 mmol/L      ANION GAP 7 mmol/L      BUN 12 mg/dL      Creatinine 0 67 mg/dL      Glucose 99 mg/dL      Calcium 8 8 mg/dL      AST 23 U/L      ALT 23 U/L      Alkaline Phosphatase 41 U/L      Total Protein 6 3 g/dL      Albumin 3 7 g/dL      Total Bilirubin 0 41 mg/dL      eGFR 93 ml/min/1 73sq m     Narrative:      [facility] guidelines for Chronic Kidney Disease (CKD):   •  Stage 1 with normal or high GFR (GFR > 90 mL/min/1 73 square meters)  •  Stage 2 Mild CKD (GFR = 60-89 mL/min/1 73 square meters)  •  Stage 3A Moderate CKD (GFR = 45-59 mL/min/1 73 square meters)  •  Stage 3B Moderate CKD (GFR = 30-44 mL/min/1 73 square meters)  •  Stage 4 Severe CKD (GFR = 15-29 mL/min/1 73 square meters)  •  Stage 5 End Stage CKD (GFR <15 mL/min/1 73 square meters)  Note: GFR calculation is accurate only with a steady state creatinine    Lactic acid [[id number]]  (Normal) Collected: 05/27/23 2335    Lab Status: Final result Specimen: Blood from Arm, Right Updated: 05/28/23 0023     LACTIC ACID 1 1 mmol/L     Narrative:      Result may be elevated if tourniquet was used during collection      Adrien Burgos [[id number]]  (Normal) Collected: 05/27/23 2335    Lab Status: Final result Specimen: Blood from Arm, Right Updated: 05/28/23 0023     Protime 14 2 seconds      INR 1 07    APTT [[id number]]  (Normal) Collected: 05/27/23 2335    Lab Status: Final result Specimen: Blood from Arm, Right Updated: 05/28/23 0023     PTT 29 seconds     CBC and differential [[id number]]  (Abnormal) Collected: 05/27/23 2335    Lab Status: Final result Specimen: Blood from Arm, Right Updated: 05/27/23 2358     WBC 6 02 Thousand/uL      RBC 3 84 Million/uL      Hemoglobin 11 4 g/dL      Hematocrit 34 3 %      MCV 89 fL      MCH 29 7 pg      MCHC 33 2 g/dL      RDW 13 3 %      MPV 10 2 fL      Platelets 484 Thousands/uL      nRBC 0 /100 WBCs      Neutrophils Relative 64 %      Immat GRANS % 2 %      Lymphocytes Relative 23 %      Monocytes Relative 9 %      Eosinophils Relative 2 %      Basophils Relative 0 %      Neutrophils Absolute 3 88 Thousands/µL      Immature Grans Absolute 0 10 Thousand/uL      Lymphocytes Absolute 1 37 Thousands/µL      Monocytes Absolute 0 52 Thousand/µL      Eosinophils Absolute 0 13 Thousand/µL      Basophils Absolute 0 02 Thousands/µL     Blood culture #1 [[id number]] Collected: 05/27/23 2343    Lab Status: In process Specimen: Blood from Arm, Left Updated: 05/27/23 2352    Blood culture #2 [[id number]] Collected: 05/27/23 2335    Lab Status:  In process Specimen: Blood from Arm, Right Updated: 05/27/23 2352                 No orders to display         Procedures  ECG 12 Lead Documentation Only    Date/Time: 5/28/2023 12:42 AM    Performed by: Kelli Gomes DO  Authorized by: Benton Gomes DO    Indications / Diagnosis:  Sepsis rule out  ECG reviewed by me, the ED Provider: yes    Patient location:  ED  Previous ECG:     Previous ECG:  Compared to current    Similarity:  No change  Interpretation:     Interpretation: normal    Rate:     ECG rate:  55    ECG rate assessment: bradycardic    Rhythm:     Rhythm: sinus rhythm and A-V block      Rhythm comment:  First-degree AV block  Ectopy:     Ectopy: none    QRS:     QRS axis:  Normal    QRS intervals:  Normal  Conduction:     Conduction: abnormal      Abnormal conduction: incomplete RBBB and LAFB    ST segments:     ST segments:  Normal  T waves:     T waves: normal    Comments:      Sinus bradycardia with incomplete right bundle branch block unchanged from prior          ED Course                             SBIRT 22yo+    Flowsheet Row Most Recent Value   Initial Alcohol Screen: US AUDIT-C     1  How often do you have a drink containing alcohol? 0 Filed at: 05/27/2023 2225   2  How many drinks containing alcohol do you have on a typical day you are drinking? 0 Filed at: 05/27/2023 2225   3a  Male UNDER 65: How often do you have five or more drinks on one occasion? 0 Filed at: 05/27/2023 2225   3b  FEMALE Any Age, or MALE 65+: How often do you have 4 or more drinks on one occassion? 0 Filed at: 05/27/2023 2225   Audit-C Score 0 Filed at: 05/27/2023 2225   ROSA: How many times in the past year have you    Used an illegal drug or used a prescription medication for non-medical reasons? Never Filed at: 05/27/2023 [id number] [facility]  Yael Fleming is a 70year old male with recent prostatectomy 5 days ago presenting for evaluation of a recorded fever of 101 degrees at home  Patient was afebrile on arrival   He denies any significant lower abdominal tenderness, postop tenderness has been improving since his surgery  Patient does have an indwelling Mcgee catheter, it has been continuing to drain appropriately  Patient was well-appearing on examination, noted to be hypertensive on arrival with systolic blood pressures greater than 200, as noted above he was afebrile  Patient had mild postoperative tenderness in the suprapubic region  Mcgee catheter draining appropriately  Given his recent procedure, did have concern for possible bacteremia, possible urinary tract infection    Sepsis work-up was initiated with blood culture collection  Work-up showed slight hyponatremia, he did not have any significant leukocytosis, procalcitonin unremarkable  Lactic acid within normal limits  Patient does not appear to be fulminantly septic  Blood cultures pending  UA was concerning for urinary tract infection  Patient treated with Keflex, prescription of Keflex sent to his pharmacy  Patient was also given a dose of hydralazine, his blood pressure responded appropriately  Patient was stable for discharge, advised that he follow-up with his urologist as well as his PCP to discuss his elevated blood pressures, prescription of Keflex sent to his pharmacy, I gave strict return precautions, he was agreeable to plan  Hypertension: acute illness or injury  Hyponatremia: acute illness or injury  UTI (urinary tract infection): acute illness or injury  Amount and/or Complexity of Data Reviewed  Labs: ordered  Risk  Prescription drug management  Disposition  Final diagnoses:   Hypertension   Hyponatremia   UTI (urinary tract infection)     Time reflects when diagnosis was documented in both MDM as applicable and the Disposition within this note     Time User Action Codes Description Comment    5/28/2023  1:16 AM Jared Obregon Add [I10] Hypertension     5/28/2023  1:16 AM Jared Obregon Add [E87 1] Hyponatremia     5/28/2023  1:16 AM Jared Obregon Add [N39 0] UTI (urinary tract infection)     5/28/2023  1:16 AM Jared Obregon Modify [I10] Hypertension     5/28/2023  1:16 AM Jared Obregon Modify [N39 0] UTI (urinary tract infection)       ED Disposition     ED Disposition   Discharge    Condition   Stable    Date/Time   Sun May 28, 2023  1:16 AM    Comment   Mynor Blackman discharge to home/self care                 Follow-up Information     Follow up With Specialties Details Why Sage Ngo MD Urology Call   [address] [facility] [address]  [phone]            Discharge Medication List as of 5/28/2023  1:18 AM      START taking these medications    Details   cephalexin (Keflex) 500 mg capsule Take 1 capsule (500 mg total) by mouth 4 (four) times a day for 7 days, Starting Sun 5/28/2023, Until Sun 6/4/2023, Normal         CONTINUE these medications which have NOT CHANGED    Details   ALPRAZolam (XANAX) 0 25 mg tablet Take 1 tablet (0 25 mg total) by mouth 3 (three) times a day, Starting Tue 7/31/2018, Normal      aspirin 81 MG tablet Take by mouth every other day, Historical Med      docusate sodium (COLACE) 100 mg capsule Take 1 capsule (100 mg total) by mouth 3 (three) times a day as needed for constipation for up to 7 days, Starting Tue 5/23/2023, Until Tue 5/30/2023 at 2359, Normal      oxybutynin (DITROPAN) 5 mg tablet Take 1 tablet (5 mg total) by mouth if needed (TID PRN) for up to 10 days, Starting Tue 5/23/2023, Until Fri 6/2/2023 at 2359, Normal      oxyCODONE (Roxicodone) 5 immediate release tablet Take 1 tablet (5 mg total) by mouth every 4 (four) hours as needed for moderate pain for up to 10 doses Max Daily Amount: 30 mg, Starting Tue 5/23/2023, Normal      phenazopyridine (PYRIDIUM) 100 mg tablet TAKE 1-2 TABLETS (100-200 MG TOTAL) BY MOUTH 3 TIMES A DAY AS NEEDED NOT COV, Historical Med      polyethylene glycol (MIRALAX) 17 g packet Take 17 g by mouth daily for 7 days, Starting Tue 5/23/2023, Until Tue 5/30/2023, Normal      simvastatin (ZOCOR) 20 mg tablet Take 1 tablet (20 mg total) by mouth daily at bedtime, Starting Fri 10/1/2021, Normal      terazosin (HYTRIN) 5 mg capsule TAKE 1 CAPSULE EVERY DAY, Normal         STOP taking these medications       ciprofloxacin (CIPRO) 500 mg tablet Comments:   Reason for Stopping:             No discharge procedures on file      PDMP Review       Value Time User    PDMP Reviewed  Yes 5/27/2023 11:16 PM Jena Cotto MD           ED Provider  Attending physically available and mikala Domingo I managed the patient along with the ED Attending      Electronically Signed by         Hiram Tse DO  05/28/23 0155

## 2023-05-28 NOTE — DISCHARGE INSTRUCTIONS
Take the antibiotics as prescribed 4 times daily over the next week  Call your urologist for any further recommendations  Return to the emergency department should you develop any worsening fevers, abdominal tenderness, or any other concerning signs or symptoms

## 2023-05-28 NOTE — ED ATTENDING ATTESTATION
5/27/2023  I, Lisset Rock MD, saw and evaluated the patient  I have discussed the patient with the resident/non-physician practitioner and agree with the resident's/non-physician practitioner's findings, Plan of Care, and MDM as documented in the resident's/non-physician practitioner's note, except where noted  All available labs and Radiology studies were reviewed  I was present for key portions of any procedure(s) performed by the resident/non-physician practitioner and I was immediately available to provide assistance  At this point I agree with the current assessment done in the Emergency Department  I have conducted an independent evaluation of this patient a history and physical is as follows: Patient is a 70year old male with fever today  No N/V/D  No cough  No travel  No known ill contacts  No chest pain or sob  Has navarro catheter  Was last seen in this OR on 5/22/23 for prostatectomy  [facility] website checked on this patient and last Rx filled was on 5/23/23 for oxycodone for 3 day supply  NCAT  PERRL  Moist mucous membranes  Neck supple  Heart regular without murmur  Lungs clear  Abdomen soft and nontender  Good bowel sounds  Surgical sites C/D/I  No edema  No rash noted  DDx including but not limited to: viral illness, pneumonia, URI, pharyngitis,  cellulitis, UTI, atx; doubt intraabdominal process, abscess, meningitis, meningococcemia, sinusitis, Lyme disease, West Nile virus, COVID-19  Will check labs and CXR       ED Course         Critical Care Time  Procedures

## 2023-05-28 NOTE — ED NOTES
"Patient reports absence of symptoms at present  Denies any urinary issues or abnormal bleeding s/p surgery  States \"I only came in because I thought I had a fever, but I don't have a fever and I feel great! \"      Agustin Harmon RN  05/27/23 0206    "

## 2023-05-29 LAB — BACTERIA UR CULT: NORMAL

## 2023-05-30 NOTE — TELEPHONE ENCOUNTER
Called and spoke with patient  Advised that he can discontinue the Colace and Miralax as long as he is moving his bowels okay  Explained the importance of them is to prevent straining and pushing that comes along with constipation  He states he is moving his bowels just fine

## 2023-05-30 NOTE — TELEPHONE ENCOUNTER
PT under care of:Naresh     Pt last seen: 5/22/23 prostatectomy    PT calling today because:  Pt was in the ER on 5/27/23  Post op UTI  Pt was prescribed keflex in the ER  Pt has navarro catheter     Pt also asking if he should continue to take the colace and miralax  He was told just to take for 7 days when he left the hospital and today is 7 days              PT can be reached at: [phone]

## 2023-05-31 ENCOUNTER — HOSPITAL ENCOUNTER (INPATIENT)
Facility: HOSPITAL | Age: 78
LOS: 1 days | Discharge: HOME | DRG: 729 | End: 2023-06-01
Attending: EMERGENCY MEDICINE | Admitting: HOSPITALIST
Payer: MEDICARE

## 2023-05-31 ENCOUNTER — APPOINTMENT (EMERGENCY)
Dept: CT IMAGING | Facility: HOSPITAL | Age: 78
DRG: 729 | End: 2023-05-31
Payer: MEDICARE

## 2023-05-31 ENCOUNTER — APPOINTMENT (EMERGENCY)
Dept: RADIOLOGY | Facility: HOSPITAL | Age: 78
DRG: 729 | End: 2023-05-31
Payer: MEDICARE

## 2023-05-31 DIAGNOSIS — N99.840 POSTOPERATIVE HEMATOMA INVOLVING GENITOURINARY SYSTEM FOLLOWING GENITOURINARY PROCEDURE: ICD-10-CM

## 2023-05-31 DIAGNOSIS — N39.0 URINARY TRACT INFECTION ASSOCIATED WITH INDWELLING URETHRAL CATHETER, INITIAL ENCOUNTER (HCC): ICD-10-CM

## 2023-05-31 DIAGNOSIS — E87.1 HYPONATREMIA: ICD-10-CM

## 2023-05-31 DIAGNOSIS — R31.0 GROSS HEMATURIA: Primary | ICD-10-CM

## 2023-05-31 DIAGNOSIS — T83.511A URINARY TRACT INFECTION ASSOCIATED WITH INDWELLING URETHRAL CATHETER, INITIAL ENCOUNTER (HCC): ICD-10-CM

## 2023-05-31 DIAGNOSIS — K86.2 PANCREATIC CYST: ICD-10-CM

## 2023-05-31 DIAGNOSIS — R91.1 PULMONARY NODULE: ICD-10-CM

## 2023-05-31 PROBLEM — R93.89 ABNORMAL FINDING ON CT SCAN: Status: ACTIVE | Noted: 2023-05-31

## 2023-05-31 PROBLEM — R31.9 HEMATURIA: Status: ACTIVE | Noted: 2023-05-31

## 2023-05-31 LAB
ABO GROUP BLD: NORMAL
ALBUMIN SERPL BCP-MCNC: 4 G/DL (ref 3.5–5)
ALP SERPL-CCNC: 45 U/L (ref 34–104)
ALT SERPL W P-5'-P-CCNC: 21 U/L (ref 7–52)
ANION GAP SERPL CALCULATED.3IONS-SCNC: 5 MMOL/L (ref 4–13)
ANION GAP SERPL CALCULATED.3IONS-SCNC: 7 MMOL/L (ref 4–13)
APTT PPP: 24 SECONDS (ref 23–37)
AST SERPL W P-5'-P-CCNC: 17 U/L (ref 13–39)
ATRIAL RATE: 56 BPM
BACTERIA UR QL AUTO: ABNORMAL /HPF
BASOPHILS # BLD AUTO: 0.04 THOUSANDS/ÂΜL (ref 0–0.1)
BASOPHILS NFR BLD AUTO: 0 % (ref 0–1)
BILIRUB SERPL-MCNC: 0.46 MG/DL (ref 0.2–1)
BILIRUB UR QL STRIP: NEGATIVE
BLD GP AB SCN SERPL QL: NEGATIVE
BUN SERPL-MCNC: 13 MG/DL (ref 5–25)
BUN SERPL-MCNC: 9 MG/DL (ref 5–25)
CALCIUM SERPL-MCNC: 8.3 MG/DL (ref 8.4–10.2)
CALCIUM SERPL-MCNC: 8.9 MG/DL (ref 8.4–10.2)
CARDIAC TROPONIN I PNL SERPL HS: 3 NG/L
CHLORIDE SERPL-SCNC: 103 MMOL/L (ref 96–108)
CHLORIDE SERPL-SCNC: 95 MMOL/L (ref 96–108)
CK SERPL-CCNC: 40 U/L (ref 39–308)
CLARITY UR: ABNORMAL
CO2 SERPL-SCNC: 27 MMOL/L (ref 21–32)
CO2 SERPL-SCNC: 27 MMOL/L (ref 21–32)
COLOR UR: ABNORMAL
CREAT SERPL-MCNC: 0.59 MG/DL (ref 0.6–1.3)
CREAT SERPL-MCNC: 0.71 MG/DL (ref 0.6–1.3)
EOSINOPHIL # BLD AUTO: 0.08 THOUSAND/ÂΜL (ref 0–0.61)
EOSINOPHIL NFR BLD AUTO: 1 % (ref 0–6)
ERYTHROCYTE [DISTWIDTH] IN BLOOD BY AUTOMATED COUNT: 13.2 % (ref 11.6–15.1)
GFR SERPL CREATININE-BSD FRML MDRD: 91 ML/MIN/1.73SQ M
GFR SERPL CREATININE-BSD FRML MDRD: 98 ML/MIN/1.73SQ M
GLUCOSE SERPL-MCNC: 104 MG/DL (ref 65–140)
GLUCOSE SERPL-MCNC: 90 MG/DL (ref 65–140)
GLUCOSE UR STRIP-MCNC: NEGATIVE MG/DL
HCT VFR BLD AUTO: 34 % (ref 36.5–49.3)
HGB BLD-MCNC: 11.5 G/DL (ref 12–17)
HGB UR QL STRIP.AUTO: ABNORMAL
IMM GRANULOCYTES # BLD AUTO: 0.14 THOUSAND/UL (ref 0–0.2)
IMM GRANULOCYTES NFR BLD AUTO: 1 % (ref 0–2)
INR PPP: 1.04 (ref 0.84–1.19)
KETONES UR STRIP-MCNC: NEGATIVE MG/DL
LACTATE SERPL-SCNC: 0.9 MMOL/L (ref 0.5–2)
LEUKOCYTE ESTERASE UR QL STRIP: ABNORMAL
LIPASE SERPL-CCNC: 37 U/L (ref 11–82)
LYMPHOCYTES # BLD AUTO: 1.32 THOUSANDS/ÂΜL (ref 0.6–4.47)
LYMPHOCYTES NFR BLD AUTO: 13 % (ref 14–44)
MCH RBC QN AUTO: 30.3 PG (ref 26.8–34.3)
MCHC RBC AUTO-ENTMCNC: 33.8 G/DL (ref 31.4–37.4)
MCV RBC AUTO: 90 FL (ref 82–98)
MONOCYTES # BLD AUTO: 0.88 THOUSAND/ÂΜL (ref 0.17–1.22)
MONOCYTES NFR BLD AUTO: 9 % (ref 4–12)
NEUTROPHILS # BLD AUTO: 7.61 THOUSANDS/ÂΜL (ref 1.85–7.62)
NEUTS SEG NFR BLD AUTO: 76 % (ref 43–75)
NITRITE UR QL STRIP: NEGATIVE
NON-SQ EPI CELLS URNS QL MICRO: ABNORMAL /HPF
NRBC BLD AUTO-RTO: 0 /100 WBCS
P AXIS: 62 DEGREES
PH UR STRIP.AUTO: 6.5 [PH]
PLATELET # BLD AUTO: 196 THOUSANDS/UL (ref 149–390)
PMV BLD AUTO: 9.5 FL (ref 8.9–12.7)
POTASSIUM SERPL-SCNC: 3.8 MMOL/L (ref 3.5–5.3)
POTASSIUM SERPL-SCNC: 4.2 MMOL/L (ref 3.5–5.3)
PR INTERVAL: 212 MS
PROT SERPL-MCNC: 7 G/DL (ref 6.4–8.4)
PROT UR STRIP-MCNC: ABNORMAL MG/DL
PROTHROMBIN TIME: 13.8 SECONDS (ref 11.6–14.5)
QRS AXIS: -71 DEGREES
QRSD INTERVAL: 114 MS
QT INTERVAL: 438 MS
QTC INTERVAL: 422 MS
RBC # BLD AUTO: 3.79 MILLION/UL (ref 3.88–5.62)
RBC #/AREA URNS AUTO: ABNORMAL /HPF
RH BLD: POSITIVE
SODIUM SERPL-SCNC: 129 MMOL/L (ref 135–147)
SODIUM SERPL-SCNC: 135 MMOL/L (ref 135–147)
SP GR UR STRIP.AUTO: 1.01 (ref 1–1.03)
SPECIMEN EXPIRATION DATE: NORMAL
T WAVE AXIS: 37 DEGREES
UROBILINOGEN UR STRIP-ACNC: <2 MG/DL
VENTRICULAR RATE: 56 BPM
WBC # BLD AUTO: 10.07 THOUSAND/UL (ref 4.31–10.16)
WBC #/AREA URNS AUTO: ABNORMAL /HPF

## 2023-05-31 PROCEDURE — 83605 ASSAY OF LACTIC ACID: CPT | Performed by: EMERGENCY MEDICINE

## 2023-05-31 PROCEDURE — 74178 CT ABD&PLV WO CNTR FLWD CNTR: CPT

## 2023-05-31 PROCEDURE — 82550 ASSAY OF CK (CPK): CPT | Performed by: EMERGENCY MEDICINE

## 2023-05-31 PROCEDURE — 99285 EMERGENCY DEPT VISIT HI MDM: CPT | Performed by: EMERGENCY MEDICINE

## 2023-05-31 PROCEDURE — 81001 URINALYSIS AUTO W/SCOPE: CPT | Performed by: EMERGENCY MEDICINE

## 2023-05-31 PROCEDURE — 85025 COMPLETE CBC W/AUTO DIFF WBC: CPT | Performed by: EMERGENCY MEDICINE

## 2023-05-31 PROCEDURE — 80048 BASIC METABOLIC PNL TOTAL CA: CPT

## 2023-05-31 PROCEDURE — NC001 PR NO CHARGE

## 2023-05-31 PROCEDURE — 36415 COLL VENOUS BLD VENIPUNCTURE: CPT | Performed by: EMERGENCY MEDICINE

## 2023-05-31 PROCEDURE — 93005 ELECTROCARDIOGRAM TRACING: CPT

## 2023-05-31 PROCEDURE — 87086 URINE CULTURE/COLONY COUNT: CPT | Performed by: EMERGENCY MEDICINE

## 2023-05-31 PROCEDURE — 99285 EMERGENCY DEPT VISIT HI MDM: CPT

## 2023-05-31 PROCEDURE — 84484 ASSAY OF TROPONIN QUANT: CPT | Performed by: EMERGENCY MEDICINE

## 2023-05-31 PROCEDURE — 80053 COMPREHEN METABOLIC PANEL: CPT | Performed by: EMERGENCY MEDICINE

## 2023-05-31 PROCEDURE — 96365 THER/PROPH/DIAG IV INF INIT: CPT

## 2023-05-31 PROCEDURE — 96375 TX/PRO/DX INJ NEW DRUG ADDON: CPT

## 2023-05-31 PROCEDURE — 85730 THROMBOPLASTIN TIME PARTIAL: CPT | Performed by: EMERGENCY MEDICINE

## 2023-05-31 PROCEDURE — 86900 BLOOD TYPING SEROLOGIC ABO: CPT | Performed by: EMERGENCY MEDICINE

## 2023-05-31 PROCEDURE — NC001 PR NO CHARGE: Performed by: UROLOGY

## 2023-05-31 PROCEDURE — 87040 BLOOD CULTURE FOR BACTERIA: CPT | Performed by: EMERGENCY MEDICINE

## 2023-05-31 PROCEDURE — 99223 1ST HOSP IP/OBS HIGH 75: CPT | Performed by: GENERAL PRACTICE

## 2023-05-31 PROCEDURE — 93010 ELECTROCARDIOGRAM REPORT: CPT | Performed by: INTERNAL MEDICINE

## 2023-05-31 PROCEDURE — 96361 HYDRATE IV INFUSION ADD-ON: CPT

## 2023-05-31 PROCEDURE — 83690 ASSAY OF LIPASE: CPT | Performed by: EMERGENCY MEDICINE

## 2023-05-31 PROCEDURE — 85610 PROTHROMBIN TIME: CPT | Performed by: EMERGENCY MEDICINE

## 2023-05-31 PROCEDURE — 86901 BLOOD TYPING SEROLOGIC RH(D): CPT | Performed by: EMERGENCY MEDICINE

## 2023-05-31 PROCEDURE — 71045 X-RAY EXAM CHEST 1 VIEW: CPT

## 2023-05-31 PROCEDURE — G1004 CDSM NDSC: HCPCS

## 2023-05-31 PROCEDURE — 86850 RBC ANTIBODY SCREEN: CPT | Performed by: EMERGENCY MEDICINE

## 2023-05-31 RX ORDER — TERAZOSIN 5 MG/1
5 CAPSULE ORAL
Status: DISCONTINUED | OUTPATIENT
Start: 2023-05-31 | End: 2023-06-01 | Stop reason: HOSPADM

## 2023-05-31 RX ORDER — OXYCODONE HYDROCHLORIDE 5 MG/1
5 TABLET ORAL EVERY 4 HOURS PRN
Status: DISCONTINUED | OUTPATIENT
Start: 2023-05-31 | End: 2023-06-01 | Stop reason: HOSPADM

## 2023-05-31 RX ORDER — OXYCODONE HYDROCHLORIDE 5 MG/1
5 TABLET ORAL EVERY 4 HOURS PRN
Status: DISCONTINUED | OUTPATIENT
Start: 2023-05-31 | End: 2023-05-31

## 2023-05-31 RX ORDER — DOCUSATE SODIUM 100 MG/1
100 CAPSULE, LIQUID FILLED ORAL 3 TIMES DAILY PRN
Status: DISCONTINUED | OUTPATIENT
Start: 2023-05-31 | End: 2023-06-01 | Stop reason: HOSPADM

## 2023-05-31 RX ORDER — LABETALOL HYDROCHLORIDE 5 MG/ML
20 INJECTION, SOLUTION INTRAVENOUS EVERY 4 HOURS PRN
Status: DISCONTINUED | OUTPATIENT
Start: 2023-05-31 | End: 2023-06-01 | Stop reason: HOSPADM

## 2023-05-31 RX ORDER — SODIUM CHLORIDE 9 MG/ML
75 INJECTION, SOLUTION INTRAVENOUS CONTINUOUS
Status: DISCONTINUED | OUTPATIENT
Start: 2023-05-31 | End: 2023-06-01 | Stop reason: HOSPADM

## 2023-05-31 RX ORDER — PRAVASTATIN SODIUM 40 MG
40 TABLET ORAL
Status: DISCONTINUED | OUTPATIENT
Start: 2023-05-31 | End: 2023-06-01 | Stop reason: HOSPADM

## 2023-05-31 RX ORDER — HYDROMORPHONE HCL/PF 1 MG/ML
0.5 SYRINGE (ML) INJECTION ONCE
Status: COMPLETED | OUTPATIENT
Start: 2023-05-31 | End: 2023-05-31

## 2023-05-31 RX ORDER — LIDOCAINE HYDROCHLORIDE 20 MG/ML
1 JELLY TOPICAL ONCE
Status: COMPLETED | OUTPATIENT
Start: 2023-05-31 | End: 2023-05-31

## 2023-05-31 RX ORDER — BACITRACIN, NEOMYCIN, POLYMYXIN B 400; 3.5; 5 [USP'U]/G; MG/G; [USP'U]/G
1 OINTMENT TOPICAL 2 TIMES DAILY
Status: DISCONTINUED | OUTPATIENT
Start: 2023-05-31 | End: 2023-06-01 | Stop reason: HOSPADM

## 2023-05-31 RX ORDER — LANOLIN ALCOHOL/MO/W.PET/CERES
3 CREAM (GRAM) TOPICAL
Status: DISCONTINUED | OUTPATIENT
Start: 2023-05-31 | End: 2023-06-01 | Stop reason: HOSPADM

## 2023-05-31 RX ORDER — ONDANSETRON 2 MG/ML
4 INJECTION INTRAMUSCULAR; INTRAVENOUS ONCE
Status: DISCONTINUED | OUTPATIENT
Start: 2023-05-31 | End: 2023-06-01 | Stop reason: HOSPADM

## 2023-05-31 RX ORDER — OXYBUTYNIN CHLORIDE 5 MG/1
5 TABLET ORAL 3 TIMES DAILY PRN
Status: DISCONTINUED | OUTPATIENT
Start: 2023-05-31 | End: 2023-06-01 | Stop reason: HOSPADM

## 2023-05-31 RX ORDER — ACETAMINOPHEN 325 MG/1
650 TABLET ORAL EVERY 6 HOURS PRN
Status: DISCONTINUED | OUTPATIENT
Start: 2023-05-31 | End: 2023-06-01 | Stop reason: HOSPADM

## 2023-05-31 RX ORDER — POLYETHYLENE GLYCOL 3350 17 G/17G
17 POWDER, FOR SOLUTION ORAL DAILY
Status: DISCONTINUED | OUTPATIENT
Start: 2023-05-31 | End: 2023-06-01 | Stop reason: HOSPADM

## 2023-05-31 RX ADMIN — PRAVASTATIN SODIUM 40 MG: 40 TABLET ORAL at 15:13

## 2023-05-31 RX ADMIN — IOHEXOL 100 ML: 350 INJECTION, SOLUTION INTRAVENOUS at 07:44

## 2023-05-31 RX ADMIN — MELATONIN 3 MG: 3 TAB ORAL at 21:55

## 2023-05-31 RX ADMIN — CEFTRIAXONE SODIUM 1000 MG: 10 INJECTION, POWDER, FOR SOLUTION INTRAVENOUS at 07:22

## 2023-05-31 RX ADMIN — BACITRACIN ZINC, NEOMYCIN, POLYMYXIN B 1 SMALL APPLICATION: 400; 3.5; 5 OINTMENT TOPICAL at 17:38

## 2023-05-31 RX ADMIN — POLYETHYLENE GLYCOL 3350 17 G: 17 POWDER, FOR SOLUTION ORAL at 15:13

## 2023-05-31 RX ADMIN — SODIUM CHLORIDE 250 ML: 0.9 INJECTION, SOLUTION INTRAVENOUS at 05:52

## 2023-05-31 RX ADMIN — LIDOCAINE HYDROCHLORIDE 1 APPLICATION.: 20 JELLY TOPICAL at 05:39

## 2023-05-31 RX ADMIN — SODIUM CHLORIDE 125 ML/HR: 0.9 INJECTION, SOLUTION INTRAVENOUS at 07:01

## 2023-05-31 RX ADMIN — TERAZOSIN HYDROCHLORIDE 5 MG: 5 CAPSULE ORAL at 21:25

## 2023-05-31 RX ADMIN — HYDROMORPHONE HYDROCHLORIDE 0.5 MG: 1 INJECTION, SOLUTION INTRAMUSCULAR; INTRAVENOUS; SUBCUTANEOUS at 05:38

## 2023-05-31 NOTE — LETTER
Thank you for allowing us to participate in the care of your patient, Nathalie Jay, who was hospitalized from 5/31/2023 through 6/1/2023 with the admitting diagnosis of hematuria s/p recent prostatectomy  Patient presented with increased hematuria after procedure admitted for CBI overnight  Cleared by urology for outpatient f/u next week for navarro removal  Work up did reveal incidental findings on radiographic imaging - CT renal protocol showed a 1 4 cm pancreatic head cyst that was not previously evident on prior CT scans in 2017, as well as a 5 mm right lower lobe pulmonary nodule  If you have any additional questions or would like to discuss further, please feel free to contact me      DO Matthew Zavala  Internal Medicine, Hospitalist  [phone]

## 2023-05-31 NOTE — ED PROVIDER NOTES
History  Chief Complaint   Patient presents with   • Medical Problem     Pt had a prostatectomy done on 5/27, noticed more bleeding than normal from ureteral catheter at 10pm  Pt has 400ml output since draining  around 1am      • Abdominal Pain     C/o of LRQ and LLQ feels like cramping  Patient is a 70year old male with gross hematuria this AM with abdominal pain  No trauma  No fever  No N/V/D  Has navarro catheter  Was last seen in this ED on 5/27/23 for UTI, HTN and hyponatremia  [facility] [place] [place] [facility] website checked on this patient and last Rx filled was on 5/23/23 oxycodone for 3 day supply  No anticoagulant use  History provided by:  Patient and spouse   used: No    Medical Problem  Associated symptoms: abdominal pain    Associated symptoms: no chest pain, no diarrhea, no fever, no nausea, no shortness of breath and no vomiting    Abdominal Pain  Associated symptoms: hematuria    Associated symptoms: no chest pain, no diarrhea, no fever, no nausea, no shortness of breath and no vomiting        Prior to Admission Medications   Prescriptions Last Dose Informant Patient Reported? Taking?    ALPRAZolam (XANAX) 0 25 mg tablet  Self No No   Sig: Take 1 tablet (0 25 mg total) by mouth 3 (three) times a day   Patient taking differently: Take 0 25 mg by mouth 3 (three) times a day as needed   aspirin 81 MG tablet  Self Yes No   Sig: Take by mouth every other day   cephalexin (Keflex) 500 mg capsule   No No   Sig: Take 1 capsule (500 mg total) by mouth 4 (four) times a day for 7 days   docusate sodium (COLACE) 100 mg capsule   No No   Sig: Take 1 capsule (100 mg total) by mouth 3 (three) times a day as needed for constipation for up to 7 days   oxyCODONE (Roxicodone) 5 immediate release tablet   No No   Sig: Take 1 tablet (5 mg total) by mouth every 4 (four) hours as needed for moderate pain for up to 10 doses Max Daily Amount: 30 mg   oxybutynin (DITROPAN) 5 mg tablet   No No   Sig: Take 1 tablet (5 mg total) by mouth if needed (TID PRN) for up to 10 days   phenazopyridine (PYRIDIUM) 100 mg tablet  Self Yes No   Sig: TAKE 1-2 TABLETS (100-200 MG TOTAL) BY MOUTH 3 TIMES A DAY AS NEEDED NOT COV   polyethylene glycol (MIRALAX) 17 g packet   No No   Sig: Take 17 g by mouth daily for 7 days   simvastatin (ZOCOR) 20 mg tablet  Self No No   Sig: Take 1 tablet (20 mg total) by mouth daily at bedtime   terazosin (HYTRIN) 5 mg capsule  Self No No   Sig: TAKE 1 CAPSULE EVERY DAY   Patient taking differently: Take 5 mg by mouth daily at bedtime      Facility-Administered Medications: None       Past Medical History:   Diagnosis Date   • Bladder stones    • BPH (benign prostatic hyperplasia)    • Coronary artery disease    • Diverticulosis    • Myocardial infarction (HCC)    • Pink eye        Past Surgical History:   Procedure Laterality Date   • AORTIC VALVE SURGERY      Assessment   • CORONARY ANGIOPLASTY WITH STENT PLACEMENT     • CYSTOSCOPY  01/23/2014    Diagnostic   • JOINT REPLACEMENT Right    • KNEE ARTHROSCOPY     • PROSTATE BIOPSY  01/23/2014   • PROSTATECTOMY N/A 5/22/2023    Procedure: ROBOTIC ASSISTED SUB-TOTAL SUPRAPUBIC PROSTATECTOMY;  Surgeon: Derrek Sotelo MD;  Location: [facility];  Service: Urology   • REPLACEMENT TOTAL KNEE     • REPLACEMENT TOTAL KNEE         Family History   Problem Relation Age of Onset   • Other Father         Cardiac Disorder     I have reviewed and agree with the history as documented  E-Cigarette/Vaping   • E-Cigarette Use Never User      E-Cigarette/Vaping Substances   • Nicotine No    • THC No    • CBD No    • Flavoring No    • Other No    • Unknown No      Social History     Tobacco Use   • Smoking status: Never   • Smokeless tobacco: Never   Vaping Use   • Vaping Use: Never used   Substance Use Topics   • Alcohol use: Yes     Comment: social   • Drug use: No       Review of Systems   Constitutional: Negative for fever  Respiratory: Negative for shortness of breath  Cardiovascular: Negative for chest pain  Gastrointestinal: Positive for abdominal pain  Negative for diarrhea, nausea and vomiting  Genitourinary: Positive for hematuria  All other systems reviewed and are negative  Physical Exam  Physical Exam  Vitals and nursing note reviewed  Constitutional:       General: He is in acute distress (moderate)  HENT:      Head: Normocephalic and atraumatic  Mouth/Throat:      Mouth: Mucous membranes are moist    Eyes:      General: No scleral icterus  Cardiovascular:      Rate and Rhythm: Normal rate and regular rhythm  Heart sounds: Normal heart sounds  No murmur heard  Pulmonary:      Effort: Pulmonary effort is normal  No respiratory distress  Breath sounds: Normal breath sounds  Abdominal:      General: Bowel sounds are normal  There is distension (suprapubic)  Palpations: Abdomen is soft  Tenderness: There is abdominal tenderness (diffuse)  There is no guarding or rebound  Musculoskeletal:         General: No deformity  Cervical back: Normal range of motion and neck supple  Right lower leg: No edema  Left lower leg: No edema  Skin:     General: Skin is warm and dry  Coloration: Skin is not pale  Findings: No erythema or rash  Neurological:      General: No focal deficit present  Mental Status: He is alert and oriented to person, place, and time     Psychiatric:         Mood and Affect: Mood normal          Vital Signs  ED Triage Vitals   Temperature Pulse Respirations Blood Pressure SpO2   05/31/23 0513 05/31/23 0513 05/31/23 0517 05/31/23 0517 05/31/23 0517   97 9 °F (36 6 °C) 60 20 (!) 206/99 99 %      Temp src Heart Rate Source Patient Position - Orthostatic VS BP Location FiO2 (%)   -- 05/31/23 0513 05/31/23 0600 05/31/23 0600 --    Monitor Lying Right arm       Pain Score       05/31/23 0517       6           Vitals:    05/31/23 0600 05/31/23 0630 05/31/23 0700 05/31/23 0800   BP: 167/82 168/80 164/79 155/73   Pulse: 59 (!) 54 55 57   Patient Position - Orthostatic VS: Lying Lying           Visual Acuity      ED Medications  Medications   sodium chloride 0 9 % infusion (125 mL/hr Intravenous New Bag 5/31/23 0701)   ondansetron (ZOFRAN) injection 4 mg (4 mg Intravenous Not Given 5/31/23 0546)   sodium chloride 0 9 % bolus 250 mL (0 mL Intravenous Stopped 5/31/23 0707)   HYDROmorphone (DILAUDID) injection 0 5 mg (0 5 mg Intravenous Given 5/31/23 0538)   lidocaine (URO-JET) 2 % urethral/mucosal gel 1 application  (1 application  Urethral Given 5/31/23 0539)   ceftriaxone (ROCEPHIN) 1 g/50 mL in dextrose IVPB (0 mg Intravenous Stopped 5/31/23 0805)   iohexol (OMNIPAQUE) 350 MG/ML injection (SINGLE-DOSE) 100 mL (100 mL Intravenous Given 5/31/23 0744)       Diagnostic Studies  Results Reviewed     Procedure Component Value Units Date/Time    Blood culture #2 [[id number]] Collected: 05/31/23 0721    Lab Status:  In process Specimen: Blood from Hand, Left Updated: 05/31/23 0727    Urine Microscopic [[id number]]  (Abnormal) Collected: 05/31/23 0559    Lab Status: Final result Specimen: Urine, Indwelling Mcgee Catheter Updated: 05/31/23 0635     RBC, UA Innumerable /hpf      WBC, UA Innumerable /hpf      Epithelial Cells None Seen /hpf      Bacteria, UA None Seen /hpf     UA (URINE) with reflex to Scope [[id number]]  (Abnormal) Collected: 05/31/23 0559    Lab Status: Final result Specimen: Urine, Indwelling Mcgee Catheter Updated: 05/31/23 0634     Color, UA Red     Clarity, UA Extra Turbid     Specific [place], UA 1 015     pH, UA 6 5     Leukocytes, UA Trace     Nitrite, UA Negative     Protein,  (2+) mg/dl      Glucose, UA Negative mg/dl      Ketones, UA Negative mg/dl      Urobilinogen, UA <2 0 mg/dl      Bilirubin, UA Negative     Occult Blood, UA Large    HS Troponin 0hr (reflex protocol) [[id number]]  (Normal) Collected: 05/31/23 0543    Lab Status: Final result Specimen: Blood from Arm, Right Updated: 05/31/23 0627     hs TnI 0hr 3 ng/L     Comprehensive metabolic panel [[id number]]  (Abnormal) Collected: 05/31/23 0543    Lab Status: Final result Specimen: Blood from Arm, Right Updated: 05/31/23 0617     Sodium 129 mmol/L      Potassium 3 8 mmol/L      Chloride 95 mmol/L      CO2 27 mmol/L      ANION GAP 7 mmol/L      BUN 13 mg/dL      Creatinine 0 71 mg/dL      Glucose 104 mg/dL      Calcium 8 9 mg/dL      AST 17 U/L      ALT 21 U/L      Alkaline Phosphatase 45 U/L      Total Protein 7 0 g/dL      Albumin 4 0 g/dL      Total Bilirubin 0 46 mg/dL      eGFR 91 ml/min/1 73sq m     Narrative:      [place] guidelines for Chronic Kidney Disease (CKD):   •  Stage 1 with normal or high GFR (GFR > 90 mL/min/1 73 square meters)  •  Stage 2 Mild CKD (GFR = 60-89 mL/min/1 73 square meters)  •  Stage 3A Moderate CKD (GFR = 45-59 mL/min/1 73 square meters)  •  Stage 3B Moderate CKD (GFR = 30-44 mL/min/1 73 square meters)  •  Stage 4 Severe CKD (GFR = 15-29 mL/min/1 73 square meters)  •  Stage 5 End Stage CKD (GFR <15 mL/min/1 73 square meters)  Note: GFR calculation is accurate only with a steady state creatinine    Lipase [[id number]]  (Normal) Collected: 05/31/23 0543    Lab Status: Final result Specimen: Blood from Arm, Right Updated: 05/31/23 0617     Lipase 37 u/L     CK [[id number]]  (Normal) Collected: 05/31/23 0543    Lab Status: Final result Specimen: Blood from Arm, Right Updated: 05/31/23 0617     Total CK 40 U/L     Lactic acid, plasma (w/reflex if result > 2 0) [[id number]]  (Normal) Collected: 05/31/23 0551    Lab Status: Final result Specimen: Blood from Arm, Left Updated: 05/31/23 0616     LACTIC ACID 0 9 mmol/L     Narrative:      Result may be elevated if tourniquet was used during collection  Urine culture [[id number]] Collected: 05/31/23 0559    Lab Status:  In process Specimen: Urine, Indwelling Mcgee Catheter Updated: 05/31/23 0612    Blood culture #1 [[id number]] Collected: 05/31/23 0557    Lab Status: In process Specimen: Blood from Arm, Right Updated: 05/31/23 0612    Protime-INR [[id number]]  (Normal) Collected: 05/31/23 0543    Lab Status: Final result Specimen: Blood from Arm, Right Updated: 05/31/23 0608     Protime 13 8 seconds      INR 1 04    APTT [[id number]]  (Normal) Collected: 05/31/23 0543    Lab Status: Final result Specimen: Blood from Arm, Right Updated: 05/31/23 0608     PTT 24 seconds     CBC and differential [[id number]]  (Abnormal) Collected: 05/31/23 0543    Lab Status: Final result Specimen: Blood from Arm, Right Updated: 05/31/23 0559     WBC 10 07 Thousand/uL      RBC 3 79 Million/uL      Hemoglobin 11 5 g/dL      Hematocrit 34 0 %      MCV 90 fL      MCH 30 3 pg      MCHC 33 8 g/dL      RDW 13 2 %      MPV 9 5 fL      Platelets 895 Thousands/uL      nRBC 0 /100 WBCs      Neutrophils Relative 76 %      Immat GRANS % 1 %      Lymphocytes Relative 13 %      Monocytes Relative 9 %      Eosinophils Relative 1 %      Basophils Relative 0 %      Neutrophils Absolute 7 61 Thousands/µL      Immature Grans Absolute 0 14 Thousand/uL      Lymphocytes Absolute 1 32 Thousands/µL      Monocytes Absolute 0 88 Thousand/µL      Eosinophils Absolute 0 08 Thousand/µL      Basophils Absolute 0 04 Thousands/µL                  CT renal protocol   ED Interpretation by Arnoldo Olivas MD (06/68 0278)   FINDINGS:     ABDOMEN     RIGHT KIDNEY AND URETER:  No solid renal mass  No detectable urothelial mass  No hydronephrosis or hydroureter  No urinary tract calculi  No perinephric collection      LEFT KIDNEY AND URETER:  No solid renal mass  No detectable urothelial mass  Upper pole cyst 2 4 x 2 5 cm, and lower pole subcentimeter hypodensity, too small to characterize  No hydronephrosis or hydroureter  No urinary tract calculi  No perinephric collection      URINARY BLADDER:  Diffusely thickened wall  Mcgee in situ   0 9 cm radiodense focus in the lumen suggestive of a blood clot  No calculi         LOWER CHEST: 5 mm right lower lobe nodule  No masses or infiltrates      LIVER/BILIARY TREE:  Unremarkable      GALLBLADDER:  No calcified gallstones  No pericholecystic inflammatory change      SPLEEN: Punctate calcified granuloma, and 0 9 cm subcapsular hypodensity likely reflecting a cyst or lymphangioma, otherwise unremarkable      PANCREAS: 1 4 cm unilocular thin-walled cyst in t   he ventral head, not evident in 2017  Otherwise unremarkable      ADRENAL GLANDS:  Unremarkable      STOMACH AND BOWEL:  Unremarkable      APPENDIX:  No findings to suggest appendicitis      ABDOMINOPELVIC CAVITY: Small foci of air in the inferior pelvis, likely postsurgical      VESSELS:  Unremarkable for patient's age      PELVIS     REPRODUCTIVE ORGANS: Heterogeneously hypodense soft tissue density in the prostatectomy bed  It measures 5 x 8 4 x 8 1 cm and likely reflects a hematoma  No obvious blush to suggest active extravasation      ABDOMINAL WALL/INGUINAL REGIONS: Mild subcutaneous emphysema, likely postsurgical      OSSEOUS STRUCTURES:  No acute fracture or destructive osseous lesion  Degenerative changes in the visible spine      IMPRESSION:     5 x 8 4 x 8 1 cm hematoma at the prostatectomy bed  No active extravasation noted  Diffusely thickened bladder wall  Small hyperdense focus likely reflecting a blood clot  Mcgee in satisfactory position  Mild subcutaneous and mini   mal intrapelvic gas, likely postsurgical   5 mm right lower lobe nodule  Recommend comparison with prior outside studies  If not available, recommend baseline outpatient CT chest, and follow-up as per Fleischner Society criteria  1 4 cm pancreatic head cyst, not evident in 2017  Correlate for history of prior work-up  If none, recommend further evaluation with outpatient pancreatic protocol MRI    Chronic findings, as per the body of the report      The study was marked in EPIC for immediate notification         Workstation performed: [id number]         Final Result by Sheryl Hendricks MD (05/31 9364)      5 x 8 4 x 8 1 cm hematoma at the prostatectomy bed  No active extravasation noted  Diffusely thickened bladder wall  Small hyperdense focus likely reflecting a blood clot  Mcgee in satisfactory position  Mild subcutaneous and minimal intrapelvic gas, likely postsurgical    5 mm right lower lobe nodule  Recommend comparison with prior outside studies  If not available, recommend baseline outpatient CT chest, and follow-up as per Fleischner Society criteria  1 4 cm pancreatic head cyst, not evident in 2017  Correlate for history of prior work-up  If none, recommend further evaluation with outpatient pancreatic protocol MRI  Chronic findings, as per the body of the report  The study was marked in [facility] for immediate notification  Workstation performed: [id number]         XR chest 1 view portable   ED Interpretation by Elissa Alpers, MD (05/31 5000)   No acute disease read by me  Procedures  ECG 12 Lead Documentation Only    Date/Time: 5/31/2023 6:20 AM    Performed by: Elissa Alpers, MD  Authorized by: Elissa Alpers, MD    Indications / Diagnosis:  Abdominal pain  ECG reviewed by me, the ED Provider: yes    Patient location:  ED  Previous ECG:     Previous ECG:  Compared to current    Comparison ECG info:  5/27/23    Similarity:  No change  Rate:     ECG rate:  56    ECG rate assessment: bradycardic    Rhythm:     Rhythm: sinus bradycardia and A-V block    Ectopy:     Ectopy: none    QRS:     QRS axis:  Left  Conduction:     Conduction: abnormal      Abnormal conduction: incomplete RBBB and LAFB    ST segments:     ST segments:  Normal  T waves:     T waves: normal    Comments:      I do not agree with computer reading of septal infarct, age undetermined                ED Course  ED Course as of 05/31/23 0920   Wed May 31, 2023   0651 WBC, UA(!): Innumerable  IV rocephin ordered  8257 Blood Pressure: 168/80  BP improved  4492 Labs, EKG, CXR d/w patient and wife with patient's permission  CBI with clearing of hematuria  6089 CT d/w patient including need for outpatient CT chest in 12 months to f/u on lung nodule and outpatient MRI of pancreas to f/u on pancreatic cyst     2535 I tigertexted urology AP on call with results of tests  7050 D/w urology AP and urology will consult and requests SLIM admission and I ordered manual irrigation of navarro and d/w SLIM Dr Jessica Santana for med surg admission  Initial Sepsis Screening     Row Name 05/31/23 7238                Is the patient's history suggestive of a new or worsening infection? Yes (Proceed)  -AO        Suspected source of infection urinary tract infection  -AO        Indicate SIRS criteria --        Are two or more of the above signs & symptoms of infection both present and new to the patient? No  -AO              User Key  (r) = Recorded By, (t) = Taken By, (c) = Cosigned By    [address] Name Provider Delroy Boeck, MD Physician                SBIRT 22yo+    Flowsheet Row Most Recent Value   Initial Alcohol Screen: US AUDIT-C     1  How often do you have a drink containing alcohol? 0 Filed at: 05/31/2023 0600   2  How many drinks containing alcohol do you have on a typical day you are drinking? 0 Filed at: 05/31/2023 0600   3a  Male UNDER 65: How often do you have five or more drinks on one occasion? 0 Filed at: 05/31/2023 0600   3b  FEMALE Any Age, or MALE 65+: How often do you have 4 or more drinks on one occassion? 0 Filed at: 05/31/2023 0600   Audit-C Score 0 Filed at: 05/31/2023 0600   ROSA: How many times in the past year have you    Used an illegal drug or used a prescription medication for non-medical reasons?  Never Filed at: 05/31/2023 0600                    Medical Decision Making  DDx including but not limited to: UTI, hemorrhagic cystitis, coagulopathy, anemia, renal colic, pyelonephritis, tumor, urinary retention, recent instrumentation  Differential diagnosis including but not limited to: hypertension, hypertensive urgency, hypertensive crisis, malignant hypertension, end organ damage, renal failure, metabolic abnormality; doubt intracranial process, ACS, MI; doubt pheochromocytoma  Amount and/or Complexity of Data Reviewed  Labs: ordered  Decision-making details documented in ED Course  Radiology: ordered and independent interpretation performed  Decision-making details documented in ED Course  ECG/medicine tests: ordered and independent interpretation performed  Decision-making details documented in ED Course  Risk  Prescription drug management  Parenteral controlled substances  Decision regarding hospitalization            Disposition  Final diagnoses:   Gross hematuria   Urinary tract infection associated with indwelling urethral catheter, initial encounter ([facility] )   Hyponatremia   Pancreatic cyst   Postoperative hematoma involving genitourinary system following genitourinary procedure   Pulmonary nodule     Time reflects when diagnosis was documented in both MDM as applicable and the Disposition within this note     Time User Action Codes Description Comment    5/31/2023  6:52 AM Render Record Add [R31 0] Gross hematuria     5/31/2023  6:52 AM Render Record Add [T88 561H,  N39 0] Urinary tract infection associated with indwelling urethral catheter, initial encounter ([facility] )     5/31/2023  6:52 AM Render Record Add [E87 1] Hyponatremia     5/31/2023  8:35 AM Render Record Add [K86 2] Pancreatic cyst     5/31/2023  8:36 AM Render Record Add [C26 033] Postoperative hematoma involving genitourinary system following genitourinary procedure     5/31/2023  8:37 AM Render Record Add [R91 1] Pulmonary nodule       ED Disposition     ED Disposition   Admit    Condition   Stable    Date/Time   Wed May 31, 2023 9:15 AM    Comment   Case was discussed with Dr Yaneli Bowers and the patient's admission status was agreed to be Admission Status: inpatient status to the service of Dr Yaneli Bowers   Follow-up Information    None         Patient's Medications   Discharge Prescriptions    No medications on file       No discharge procedures on file      PDMP Review       Value Time User    PDMP Reviewed  Yes 5/31/2023  5:05 AM Martha Porras MD          ED Provider  Electronically Signed by           Martha Porras MD  05/31/23 6828

## 2023-05-31 NOTE — ED NOTES
RN attempted manual irrigation with normal saline  Pt tolerated with no issues  Clear pink/light red irrigation and urine output  No clots were able to be manually removed  CBI restarted and flowing well with no issues  Pt and wife informed pt remains NPO and will be admitted--pending plan from [facility] and urology        Whitney Conn RN  05/31/23 9436

## 2023-05-31 NOTE — PLAN OF CARE
Problem: PAIN - ADULT  Goal: Verbalizes/displays adequate comfort level or baseline comfort level  Description: Interventions:  - Encourage patient to monitor pain and request assistance  - Assess pain using appropriate pain scale  - Administer analgesics based on type and severity of pain and evaluate response  - Implement non-pharmacological measures as appropriate and evaluate response  - Consider cultural and social influences on pain and pain management  - Notify physician/advanced practitioner if interventions unsuccessful or patient reports new pain  Outcome: Progressing     Problem: INFECTION - ADULT  Goal: Absence or prevention of progression during hospitalization  Description: INTERVENTIONS:  - Assess and monitor for signs and symptoms of infection  - Monitor lab/diagnostic results  - Monitor all insertion sites, i e  indwelling lines, tubes, and drains  - Monitor endotracheal if appropriate and nasal secretions for changes in amount and color  - [place] appropriate cooling/warming therapies per order  - Administer medications as ordered  - Instruct and encourage patient and family to use good hand hygiene technique  - Identify and instruct in appropriate isolation precautions for identified infection/condition  Outcome: Progressing  Goal: Absence of fever/infection during neutropenic period  Description: INTERVENTIONS:  - Monitor WBC    Outcome: Progressing     Problem: SAFETY ADULT  Goal: Patient will remain free of falls  Description: INTERVENTIONS:  - Educate patient/family on patient safety including physical limitations  - Instruct patient to call for assistance with activity   - Consult OT/PT to assist with strengthening/mobility   - Keep Call bell within reach  - Keep bed low and locked with side rails adjusted as appropriate  - Keep care items and personal belongings within reach  - Initiate and maintain comfort rounds  - Make Fall Risk Sign visible to staff  - Offer Toileting every 2 Hours, in advance of need  - Initiate/Maintain bed alarm  - Obtain necessary fall risk management equipment  - Apply yellow socks and bracelet for high fall risk patients  - Consider moving patient to room near nurses station  Outcome: Progressing  Goal: Maintain or return to baseline ADL function  Description: INTERVENTIONS:  -  Assess patient's ability to carry out ADLs; assess patient's baseline for ADL function and identify physical deficits which impact ability to perform ADLs (bathing, care of mouth/teeth, toileting, grooming, dressing, etc )  - Assess/evaluate cause of self-care deficits   - Assess range of motion  - Assess patient's mobility; develop plan if impaired  - Assess patient's need for assistive devices and provide as appropriate  - Encourage maximum independence but intervene and supervise when necessary  - Involve family in performance of ADLs  - Assess for home care needs following discharge   - Consider OT consult to assist with ADL evaluation and planning for discharge  - Provide patient education as appropriate  Outcome: Progressing  Goal: Maintains/Returns to pre admission functional level  Description: INTERVENTIONS:  - Perform BMAT or MOVE assessment daily    - Set and communicate daily mobility goal to care team and patient/family/caregiver  - Collaborate with rehabilitation services on mobility goals if consulted  - Perform Range of Motion 2 times a day  - Reposition patient every 2 hours    - Dangle patient 2 times a day  - Stand patient 3 times a day  - Ambulate patient 3 times a day  - Out of bed to chair 3 times a day   - Out of bed for meals 3 times a day  - Out of bed for toileting  - Record patient progress and toleration of activity level   Outcome: Progressing     Problem: DISCHARGE PLANNING  Goal: Discharge to home or other facility with appropriate resources  Description: INTERVENTIONS:  - Identify barriers to discharge w/patient and caregiver  - Arrange for needed discharge resources and transportation as appropriate  - Identify discharge learning needs (meds, wound care, etc )  - Arrange for interpretive services to assist at discharge as needed  - Refer to Case Management Department for coordinating discharge planning if the patient needs post-hospital services based on physician/advanced practitioner order or complex needs related to functional status, cognitive ability, or social support system  Outcome: Progressing     Problem: Knowledge Deficit  Goal: Patient/family/caregiver demonstrates understanding of disease process, treatment plan, medications, and discharge instructions  Description: Complete learning assessment and assess knowledge base    Interventions:  - Provide teaching at level of understanding  - Provide teaching via preferred learning methods  Outcome: Progressing

## 2023-05-31 NOTE — ASSESSMENT & PLAN NOTE
1 3 cm unilocular cyst noted on the pancreas during CT scanning      Plan  Outpatient MRI for further evaluation

## 2023-05-31 NOTE — ASSESSMENT & PLAN NOTE
History of 2 stents placed approximately 17 years ago    Patient maintained on 81 mg ASA PTA    Hold ASA 2/2 hematuria

## 2023-05-31 NOTE — ASSESSMENT & PLAN NOTE
"-S/p prostatectomy 5/22 performed at [facility] by Dr Tyrone Raymond during which 50 bladder stones measuring 3 mm were evacuated, and large median lobe of prostate was resected  At that time procedure report indicated no complications     -On Saturday (5/27) patient noted feeling chills, c/o fever, presented to ED, UA showed WBC and occasional bacteria  /100, low sodium  Sent home with antibiotics, used x1 day, stopped when urine culture showed no infection  (Anticipated taking prophylactic antibiotic starting tomorrow for removal of Mcgee on Friday )  -Yesterday patient has noted blood in urine  Midnight woke with darker urine \"like real blood  \"  Also noted painful pelvic cramps at that time and came to what he might feel when having diarrhea    -ED: Received 1G ceftriaxone, began continuous bladder irrigation    Plan  · Bladder irrigation  · Consult urology appreciate recommendations  · Daily CBC  · Prophylactic 1 g ceftriaxone daily  · Continue home tamsulosin qhs  · Home Ditropan as needed  "

## 2023-05-31 NOTE — ASSESSMENT & PLAN NOTE
POA Na 129    Previously 131 on 5/27, 128 on 5/23, 135 on 4/25  ED: Received 750 mL and boli of normal saline with 125 mL/h running now    Recent Labs     05/31/23  0543   SODIUM 129*     Possibly secondary to patient admitted extra hydration in setting of surgery    Plan  Recheck BMP now s/p ~1L NS  Continue 125 mL/h normal saline

## 2023-05-31 NOTE — CONSULTS
Consult - Urology   Kavitha [facility] 1945, 70 y o  male MRN: [id number]    Unit/Bed#: ED-03 Encounter: [id number]        Assessment & Plan:  1  Hematuria  2  S/p prostatectomy  - Patient's status post prostatectomy, evacuation of bladder stones, lysis of adhesions on 5/22 with Dr Michele Sawyer  Operation went as planned, no complications  Patient was discharged 5/23/2023   -Since his procedure patient has noted hematuria that comes and goes  Worse after bowel movement or excessive movement  Reports worsening hematuria, punch colored last night and suprapubic pain prompting ED visit  -CT scan showing 5 x 8 4 x 8 1 cm hematoma at prostatectomy bed  No active extravasation noted  Diffusely thickened wall bladder wall  Small hyperdense focus likely reflecting a blood clot  Mcgee in satisfactory position  -ED exchanged catheter due to poorly draining  Nursing reported obtained minimal clots prior to Mcgee catheter exchange  - Mcgee catheter draining light blush colored urine, no clots  -No leukocytosis  -Hemoglobin stable 11 5  -UA innumerable WBCs, no bacteria  Trace leukocytes, nitrate negative   -No surgical intervention required  -Maintain Mcgee catheter for the next week  We will reschedule Mcgee catheter removal with office  -Continue CBI overnight  Manual irrigation every 6 hours   -We will reassess tomorrow  -Urology will continue to follow    Subjective:   Samaria Tony is a 21-year-old with possible history of BPH status post prostatectomy on 5/22 with Dr Michele Sawyer, history of CAD, HTN who presented to the ED due to gross hematuria, suprapubic pressure  Patient was discharged on 5/23/2023 after undergoing robotic assisted subtotal suprapubic prostatectomy, evacuation of bladder stones, lysis of adhesions with Dr Michele Sawyer  The procedure went as planned, there were no complications  Patient reports since his procedure he has had gross hematuria that comes and goes    He reports worse hematuria after bowel movement, with increased movement  He reports last night Mcgee catheter was bright red-colored, and he had increased suprapubic pain prompting him to present to the ED  He denies any associated symptoms including fevers, chills, flank pain, catheter associated pain, shortness of breath, chest pain, dizziness, headaches  Review of Systems   Constitutional: Negative for chills and fever  Respiratory: Negative for cough and shortness of breath  Cardiovascular: Negative for chest pain and palpitations  Gastrointestinal: Negative for abdominal pain and vomiting  Genitourinary: Negative for dysuria and hematuria  Musculoskeletal: Negative for arthralgias and back pain  Skin: Negative for color change and rash  Neurological: Negative for seizures and syncope  All other systems reviewed and are negative  Objective:  Vitals: Blood pressure 145/67, pulse 58, temperature 97 9 °F (36 6 °C), resp  rate 17, SpO2 99 %  ,There is no height or weight on file to calculate BMI  Physical Exam  Constitutional:       General: He is not in acute distress  Appearance: Normal appearance  He is normal weight  He is not ill-appearing or toxic-appearing  HENT:      Head: Normocephalic and atraumatic  Right Ear: External ear normal       Left Ear: External ear normal       Nose: Nose normal       Mouth/Throat:      Mouth: Mucous membranes are moist    Eyes:      General: No scleral icterus  Conjunctiva/sclera: Conjunctivae normal    Cardiovascular:      Rate and Rhythm: Normal rate  Pulses: Normal pulses  Pulmonary:      Effort: Pulmonary effort is normal    Abdominal:      General: Abdomen is flat  There is no distension  Palpations: Abdomen is soft  Tenderness: There is no abdominal tenderness  There is no guarding  Comments: Midline incision well approximated, no erythema, drainage, swelling  Laparoscopic sites unremarkable  Dermabond adhered to incision sites    Surgical sites healing appropriately   Genitourinary:     Comments: Mcgee catheter in place draining light blush colored urine  No clots  Musculoskeletal:         General: Normal range of motion  Cervical back: Normal range of motion  Skin:     General: Skin is warm  Findings: No rash  Neurological:      General: No focal deficit present  Mental Status: He is alert and oriented to person, place, and time  Mental status is at baseline  Psychiatric:         Mood and Affect: Mood normal          Behavior: Behavior normal          Thought Content: Thought content normal          Judgment: Judgment normal        Imaging:    CT ABDOMEN AND PELVIS WITH AND WITHOUT IV CONTRAST     INDICATION:   Flank pain, gross hematuria, recent prostatectomy     COMPARISON: 9/25/2017     TECHNIQUE: Initial CT of the abdomen and pelvis was performed without intravenous contrast   Subsequent dynamic CT evaluation of the abdomen and pelvis was performed after the administration of intravenous contrast in both nephrographic and delayed   phases  Multiplanar 2D reformatted images were created from the source data      This examination, like all CT scans performed in the [facility], was performed utilizing techniques to minimize radiation dose exposure, including the use of iterative reconstruction and automated exposure control  Radiation dose length   product (DLP) for this visit:  1250 mGy-cm     IV Contrast:  100 mL of iohexol (OMNIPAQUE)  Enteric Contrast:  Enteric contrast was not administered      FINDINGS:     ABDOMEN     RIGHT KIDNEY AND URETER:  No solid renal mass  No detectable urothelial mass  No hydronephrosis or hydroureter  No urinary tract calculi  No perinephric collection      LEFT KIDNEY AND URETER:  No solid renal mass  No detectable urothelial mass  Upper pole cyst 2 4 x 2 5 cm, and lower pole subcentimeter hypodensity, too small to characterize  No hydronephrosis or hydroureter    No urinary tract calculi  No perinephric collection      URINARY BLADDER:  Diffusely thickened wall  Mcgee in situ  0 9 cm radiodense focus in the lumen suggestive of a blood clot  No calculi         LOWER CHEST: 5 mm right lower lobe nodule  No masses or infiltrates      LIVER/BILIARY TREE:  Unremarkable      GALLBLADDER:  No calcified gallstones  No pericholecystic inflammatory change      SPLEEN: Punctate calcified granuloma, and 0 9 cm subcapsular hypodensity likely reflecting a cyst or lymphangioma, otherwise unremarkable      PANCREAS: 1 4 cm unilocular thin-walled cyst in the ventral head, not evident in 2017  Otherwise unremarkable      ADRENAL GLANDS:  Unremarkable      STOMACH AND BOWEL:  Unremarkable      APPENDIX:  No findings to suggest appendicitis      ABDOMINOPELVIC CAVITY: Small foci of air in the inferior pelvis, likely postsurgical      VESSELS:  Unremarkable for patient's age      PELVIS     REPRODUCTIVE ORGANS: Heterogeneously hypodense soft tissue density in the prostatectomy bed  It measures 5 x 8 4 x 8 1 cm and likely reflects a hematoma  No obvious blush to suggest active extravasation      ABDOMINAL WALL/INGUINAL REGIONS: Mild subcutaneous emphysema, likely postsurgical      OSSEOUS STRUCTURES:  No acute fracture or destructive osseous lesion  Degenerative changes in the visible spine      IMPRESSION:     5 x 8 4 x 8 1 cm hematoma at the prostatectomy bed  No active extravasation noted  Diffusely thickened bladder wall  Small hyperdense focus likely reflecting a blood clot  Mcgee in satisfactory position  Mild subcutaneous and minimal intrapelvic gas, likely postsurgical   5 mm right lower lobe nodule  Recommend comparison with prior outside studies  If not available, recommend baseline outpatient CT chest, and follow-up as per Fleischner Society criteria  1 4 cm pancreatic head cyst, not evident in 2017  Correlate for history of prior work-up   If none, recommend further evaluation with outpatient pancreatic protocol MRI  Chronic findings, as per the body of the report      The study was marked in [facility] for immediate notification         Workstation performed: [id number]       Labs:  Recent Labs     05/31/23  0543   WBC 10 07     Recent Labs     05/31/23  0543   HGB 11 5*     Recent Labs     05/31/23  0543   CREATININE 0 71       History:  Social History     Socioeconomic History   • Marital status: /Civil Union     Spouse name: None   • Number of children: None   • Years of education: None   • Highest education level: None   Occupational History   • None   Tobacco Use   • Smoking status: Never   • Smokeless tobacco: Never   Vaping Use   • Vaping Use: Never used   Substance and Sexual Activity   • Alcohol use: Yes     Comment: social   • Drug use: No   • Sexual activity: Not Currently     Partners: Female   Other Topics Concern   • None   Social History Narrative   • None     Social Determinants of Health     Financial Resource Strain: Low Risk  (1/12/2023)    Overall Financial Resource Strain (CARDIA)    • Difficulty of Paying Living Expenses: Not hard at all   Food Insecurity: Not on file   Transportation Needs: No Transportation Needs (1/12/2023)    PRAPARE - Transportation    • Lack of Transportation (Medical): No    • Lack of Transportation (Non-Medical):  No   Physical Activity: Not on file   Stress: Not on file   Social Connections: Not on file   Intimate Partner Violence: Not on file   Housing Stability: Not on file     Past Medical History:   Diagnosis Date   • Bladder stones    • BPH (benign prostatic hyperplasia)    • Coronary artery disease    • Diverticulosis    • Myocardial infarction ([facility] [address] )    • Pink eye      Past Surgical History:   Procedure Laterality Date   • AORTIC VALVE SURGERY      Assessment   • CORONARY ANGIOPLASTY WITH STENT PLACEMENT     • CYSTOSCOPY  01/23/2014    Diagnostic   • JOINT REPLACEMENT Right    • KNEE ARTHROSCOPY     • PROSTATE BIOPSY  01/23/2014   • PROSTATECTOMY N/A 5/22/2023    Procedure: ROBOTIC ASSISTED SUB-TOTAL SUPRAPUBIC PROSTATECTOMY;  Surgeon: Dede Steve MD;  Location: [facility];  Service: Urology   • REPLACEMENT TOTAL KNEE     • REPLACEMENT TOTAL KNEE       Family History   Problem Relation Age of Onset   • Other Father         Cardiac Disorder       Manoj Sandoval PA-C  Date: 5/31/2023 Time: 12:37 PM

## 2023-05-31 NOTE — H&P
"[facility]  H&P  Name: Keily Mcclure 70 y o  male I MRN: [id number]  Unit/Bed#: ED-03 I Date of Admission: 5/31/2023   Date of Service: 5/31/2023 I Hospital Day: 0      Assessment/Plan   * Hematuria  Assessment & Plan  -S/p prostatectomy 5/22 performed at [place] [address] by Dr Michael Rao during which 48 bladder stones measuring 3 mm were evacuated, and large median lobe of prostate was resected  At that time procedure report indicated no complications     -On Saturday (5/27) patient noted feeling chills, c/o fever, presented to ED, UA showed WBC and occasional bacteria  /100, low sodium  Sent home with antibiotics, used x1 day, stopped when urine culture showed no infection  (Anticipated taking prophylactic antibiotic starting tomorrow for removal of Mcgee on Friday )  -Yesterday patient has noted blood in urine  Midnight woke with darker urine \"like real blood  \"  Also noted painful pelvic cramps at that time and came to what he might feel when having diarrhea  -ED: Received 1G ceftriaxone, began continuous bladder irrigation    Plan  · Bladder irrigation  · Consult urology appreciate recommendations  · Daily CBC  · Prophylactic 1 g ceftriaxone daily  · Continue home tamsulosin qhs  · Home Ditropan as needed    Hyponatremia  Assessment & Plan  POA Na 129  Previously 131 on 5/27, 128 on 5/23, 135 on 4/25  ED: Received 750 mL and boli of normal saline with 125 mL/h running now    Recent Labs     05/31/23  0543   SODIUM 129*     Possibly secondary to patient admitted extra hydration in setting of surgery    Plan  Recheck BMP now s/p ~1L NS  Continue 125 mL/h normal saline            Abnormal finding on CT scan  Assessment & Plan  1 3 cm unilocular cyst noted on the pancreas during CT scanning  Plan  Outpatient MRI for further evaluation    Hypertension  Assessment & Plan  BP noted to be 206/99 on admission  Per patient this happens sometimes  On prior admission noted to be 200/100   " Quickly normalized to < 165/85  Not prescribed antihypertensives PTA    Current Blood Pressure: 145/67    Plan   20 mg IV labetalol as needed for SBP greater than 190      CAD in native artery  Assessment & Plan  History of 2 stents placed approximately 17 years ago  Patient maintained on 81 mg ASA PTA    Hold ASA 2/2 hematuria       VTE Pharmacologic Prophylaxis:   Moderate Risk (Score 3-4) - Pharmacological DVT Prophylaxis Contraindicated  Sequential Compression Devices Ordered  Code Status: Level 1 - Full code  Discussion with family: Updated  (wife) at bedside  Anticipated Length of Stay: Patient will be admitted on an inpatient basis with an anticipated length of stay of greater than 2 midnights secondary to Hematuria  Chief Complaint: Hematuria    History of Present Illness:  Stuart June is a 70 y o  male with a PMH of BPH s/p prostatectomy on 5/22, distant CAD with 2 stents placed 17 years ago, HTN, who presents with 1 day of hematuria  Recent prostatectomy was uncomplicated per procedure note  On Saturday patient felt acutely ill, with chills subjective fever  Presented to ED for concern of infection, but was negative for leukocytosis, and although WBCs and bacteria were noted on UA, urine culture was negative for growth  He was discharged directly from ED with antibiotics, though he did not complete course after discovering that culture was negative  Yesterday patient noted blood in urine  He fell asleep and woke up with painful pelvic cramping around midnight  When he next went to the bathroom he had gross blood in urine  When symptoms did not resolve spontaneously this morning, patient presented to emergency department for further evaluation  UA on arrival showed innumerable WBC and RBC, but no bacteria  Assessment shows hyponatremia, and continued hematuria  No evidence for infection present  We will admit for treatment and urology consult      Review of Systems:  Review of Systems   Constitutional: Positive for chills  Negative for activity change, diaphoresis and fever  HENT: Negative for ear pain, nosebleeds, sinus pressure, sneezing, sore throat, tinnitus and trouble swallowing  Eyes: Positive for pain (Pressure) and visual disturbance  Respiratory: Negative for cough, chest tightness and shortness of breath  Cardiovascular: Negative for chest pain, palpitations and leg swelling  Gastrointestinal: Positive for abdominal pain (suprapubic)  Negative for blood in stool, constipation, diarrhea, nausea and vomiting  Genitourinary: Positive for dysuria (during bowel movement) and hematuria  Negative for difficulty urinating  Neurological: Negative for dizziness, tremors, seizures, syncope, numbness and headaches  Psychiatric/Behavioral: Negative for agitation, confusion and hallucinations  Past Medical and Surgical History:   Past Medical History:   Diagnosis Date   • Bladder stones    • BPH (benign prostatic hyperplasia)    • Coronary artery disease    • Diverticulosis    • Myocardial infarction ([facility] [address] )    • Pink eye        Past Surgical History:   Procedure Laterality Date   • AORTIC VALVE SURGERY      Assessment   • CORONARY ANGIOPLASTY WITH STENT PLACEMENT     • CYSTOSCOPY  01/23/2014    Diagnostic   • JOINT REPLACEMENT Right    • KNEE ARTHROSCOPY     • PROSTATE BIOPSY  01/23/2014   • PROSTATECTOMY N/A 5/22/2023    Procedure: ROBOTIC ASSISTED SUB-TOTAL SUPRAPUBIC PROSTATECTOMY;  Surgeon: Danielito Prajpaati MD;  Location: [facility];  Service: Urology   • REPLACEMENT TOTAL KNEE     • REPLACEMENT TOTAL KNEE         Meds/Allergies:  Prior to Admission medications    Medication Sig Start Date End Date Taking?  Authorizing Provider   ALPRAZolam Ramiro Anand 0 25 mg tablet Take 1 tablet (0 25 mg total) by mouth 3 (three) times a day  Patient taking differently: Take 0 25 mg by mouth 3 (three) times a day as needed 7/31/18  Yes Forrestine Fuss, DO   aspirin 81 MG tablet Take by mouth every other day   Yes Historical Provider, MD   cephalexin (Keflex) 500 mg capsule Take 1 capsule (500 mg total) by mouth 4 (four) times a day for 7 days 5/28/23 6/4/23 Yes Jared Sandhu DO   docusate sodium (COLACE) 100 mg capsule Take 1 capsule (100 mg total) by mouth 3 (three) times a day as needed for constipation for up to 7 days 5/23/23 5/31/23 Yes Kevin Gr PA-C   oxyCODONE (Roxicodone) 5 immediate release tablet Take 1 tablet (5 mg total) by mouth every 4 (four) hours as needed for moderate pain for up to 10 doses Max Daily Amount: 30 mg 5/23/23  Yes Kevin Gr PA-C   polyethylene glycol (MIRALAX) 17 g packet Take 17 g by mouth daily for 7 days 5/23/23 5/31/23 Yes Kevin Gr PA-C   simvastatin (ZOCOR) 20 mg tablet Take 1 tablet (20 mg total) by mouth daily at bedtime 10/1/21  Yes Sugar Salinas DO   terazosin (HYTRIN) 5 mg capsule TAKE 1 CAPSULE EVERY DAY  Patient taking differently: Take 5 mg by mouth daily at bedtime 2/20/23  Yes Sugar Salinas DO   oxybutynin (DITROPAN) 5 mg tablet Take 1 tablet (5 mg total) by mouth if needed (TID PRN) for up to 10 days  Patient not taking: Reported on 5/31/2023 5/23/23 6/2/23  Kevin Gr PA-C   phenazopyridine (PYRIDIUM) 100 mg tablet TAKE 1-2 TABLETS (100-200 MG TOTAL) BY MOUTH 3 TIMES A DAY AS NEEDED NOT COV 10/24/22 5/31/23  Historical Provider, MD     I have reviewed home medications with patient personally      Allergies: No Known Allergies    Social History:  Marital Status: /Civil Union   Patient Pre-hospital Living Situation: Home, With spouse  Patient Pre-hospital Level of Mobility: walks  Patient Pre-hospital Diet Restrictions: -  Substance Use History:   Social History     Substance and Sexual Activity   Alcohol Use Yes    Comment: social     Social History     Tobacco Use   Smoking Status Never   Smokeless Tobacco Never     Social History     Substance and Sexual Activity   Drug Use No Family History:  Family History   Problem Relation Age of Onset   • Other Father         Cardiac Disorder       Physical Exam:     Vitals:   Blood Pressure: 145/67 (05/31/23 0945)  Pulse: 58 (05/31/23 0945)  Temperature: 97 9 °F (36 6 °C) (05/31/23 0513)  Respirations: 17 (05/31/23 0945)  SpO2: 99 % (05/31/23 0945)    Physical Exam     Additional Data:     Lab Results:  Results from last 7 days   Lab Units 05/31/23  0543   EOS PCT % 1   HEMATOCRIT % 34 0*   HEMOGLOBIN g/dL 11 5*   LYMPHS PCT % 13*   MONOS PCT % 9   NEUTROS PCT % 76*   PLATELETS Thousands/uL 196   WBC Thousand/uL 10 07     Results from last 7 days   Lab Units 05/31/23  0543   ANION GAP mmol/L 7   ALBUMIN g/dL 4 0   ALK PHOS U/L 45   ALT U/L 21   AST U/L 17   BUN mg/dL 13   CALCIUM mg/dL 8 9   CHLORIDE mmol/L 95*   CO2 mmol/L 27   CREATININE mg/dL 0 71   GLUCOSE RANDOM mg/dL 104   POTASSIUM mmol/L 3 8   SODIUM mmol/L 129*   TOTAL BILIRUBIN mg/dL 0 46     Results from last 7 days   Lab Units 05/31/23  0543   INR  1 04             Results from last 7 days   Lab Units 05/31/23  0551 05/27/23  2335   LACTIC ACID mmol/L 0 9 1 1   PROCALCITONIN ng/ml  --  <0 05       Lines/Drains:  Invasive Devices     Peripheral Intravenous Line  Duration           Peripheral IV 05/31/23 Distal;Right;Upper;Ventral (anterior) Arm <1 day          Drain  Duration           Urethral Catheter Three way 24 Fr  <1 day              Urinary Catheter:  Goal for removal: Required during continuous bladder irrigation             Imaging: Reviewed radiology reports from this admission including: abdominal/pelvic CT  CT renal protocol   ED Interpretation by Vida Kincaid MD (40/11 [id number])   FINDINGS:     ABDOMEN     RIGHT KIDNEY AND URETER:  No solid renal mass  No detectable urothelial mass  No hydronephrosis or hydroureter  No urinary tract calculi  No perinephric collection      LEFT KIDNEY AND URETER:  No solid renal mass  No detectable urothelial mass   Upper pole cyst 2 4 x 2 5 cm, and lower pole subcentimeter hypodensity, too small to characterize  No hydronephrosis or hydroureter  No urinary tract calculi  No perinephric collection      URINARY BLADDER:  Diffusely thickened wall  Mcgee in situ  0 9 cm radiodense focus in the lumen suggestive of a blood clot  No calculi         LOWER CHEST: 5 mm right lower lobe nodule  No masses or infiltrates      LIVER/BILIARY TREE:  Unremarkable      GALLBLADDER:  No calcified gallstones  No pericholecystic inflammatory change      SPLEEN: Punctate calcified granuloma, and 0 9 cm subcapsular hypodensity likely reflecting a cyst or lymphangioma, otherwise unremarkable      PANCREAS: 1 4 cm unilocular thin-walled cyst in t   he ventral head, not evident in 2017  Otherwise unremarkable      ADRENAL GLANDS:  Unremarkable      STOMACH AND BOWEL:  Unremarkable      APPENDIX:  No findings to suggest appendicitis      ABDOMINOPELVIC CAVITY: Small foci of air in the inferior pelvis, likely postsurgical      VESSELS:  Unremarkable for patient's age      PELVIS     REPRODUCTIVE ORGANS: Heterogeneously hypodense soft tissue density in the prostatectomy bed  It measures 5 x 8 4 x 8 1 cm and likely reflects a hematoma  No obvious blush to suggest active extravasation      ABDOMINAL WALL/INGUINAL REGIONS: Mild subcutaneous emphysema, likely postsurgical      OSSEOUS STRUCTURES:  No acute fracture or destructive osseous lesion  Degenerative changes in the visible spine      IMPRESSION:     5 x 8 4 x 8 1 cm hematoma at the prostatectomy bed  No active extravasation noted  Diffusely thickened bladder wall  Small hyperdense focus likely reflecting a blood clot  Mcgee in satisfactory position  Mild subcutaneous and mini   mal intrapelvic gas, likely postsurgical   5 mm right lower lobe nodule  Recommend comparison with prior outside studies  If not available, recommend baseline outpatient CT chest, and follow-up as per Fleischner Society criteria    1 4 cm pancreatic head cyst, not evident in 2017  Correlate for history of prior work-up  If none, recommend further evaluation with outpatient pancreatic protocol MRI  Chronic findings, as per the body of the report      The study was marked in [facility] for immediate notification         Workstation performed: [id number]         Final Result by Cathi Humphreys MD (05/31 0829)      5 x 8 4 x 8 1 cm hematoma at the prostatectomy bed  No active extravasation noted  Diffusely thickened bladder wall  Small hyperdense focus likely reflecting a blood clot  Mcgee in satisfactory position  Mild subcutaneous and minimal intrapelvic gas, likely postsurgical    5 mm right lower lobe nodule  Recommend comparison with prior outside studies  If not available, recommend baseline outpatient CT chest, and follow-up as per Fleischner Society criteria  1 4 cm pancreatic head cyst, not evident in 2017  Correlate for history of prior work-up  If none, recommend further evaluation with outpatient pancreatic protocol MRI  Chronic findings, as per the body of the report  The study was marked in [facility] for immediate notification  Workstation performed: [id number]         XR chest 1 view portable   ED Interpretation by Ignacio Chan MD (05/31 8170)   No acute disease read by me  EKG and Other Studies Reviewed on Admission:   · EKG: Sinus Bradycardia  HR 56     ** Please Note: This note has been constructed using a voice recognition system   **

## 2023-05-31 NOTE — ED NOTES
Lunch tray ordered for pt  Informed pt and wife that continue to wait for bed to be ready upstairs at this time  Will continue to update on inpt bed status       Marshall Gutierrez RN  05/31/23 3868

## 2023-05-31 NOTE — SEPSIS NOTE
Sepsis Note   Berna Magana 70 y o  male MRN: [id number]  Unit/Bed#: ED-03 Encounter: [id number]       Initial Sepsis Screening     Row Name 05/31/23 0555                Is the patient's history suggestive of a new or worsening infection? Yes (Proceed)  -AO        Suspected source of infection urinary tract infection  -AO        Indicate SIRS criteria --        Are two or more of the above signs & symptoms of infection both present and new to the patient? No  -AO              User Key  (r) = Recorded By, (t) = Taken By, (c) = Cosigned By    [address] Name Provider Reynold Quiles MD Physician                    There is no height or weight on file to calculate BMI    Wt Readings from Last 1 Encounters:   05/27/23 81 6 kg (180 lb)        Ideal body weight: 82 2 kg (181 lb 3 5 oz)

## 2023-05-31 NOTE — ASSESSMENT & PLAN NOTE
BP noted to be 206/99 on admission  Per patient this happens sometimes  On prior admission noted to be 200/100     Quickly normalized to < 165/85  Not prescribed antihypertensives PTA    Current Blood Pressure: 145/67    Plan   20 mg IV labetalol as needed for SBP greater than 190

## 2023-05-31 NOTE — ED NOTES
Patient transported to [facility], [address] [place] [address]  05/31/23 9578

## 2023-06-01 ENCOUNTER — TELEPHONE (OUTPATIENT)
Dept: OTHER | Facility: HOSPITAL | Age: 78
End: 2023-06-01

## 2023-06-01 VITALS
HEART RATE: 60 BPM | DIASTOLIC BLOOD PRESSURE: 68 MMHG | OXYGEN SATURATION: 96 % | RESPIRATION RATE: 18 BRPM | SYSTOLIC BLOOD PRESSURE: 121 MMHG | TEMPERATURE: 98.8 F

## 2023-06-01 DIAGNOSIS — R97.20 BENIGN PROSTATIC HYPERPLASIA WITH ELEVATED PROSTATE SPECIFIC ANTIGEN (PSA): Primary | ICD-10-CM

## 2023-06-01 DIAGNOSIS — N40.0 BENIGN PROSTATIC HYPERPLASIA WITH ELEVATED PROSTATE SPECIFIC ANTIGEN (PSA): Primary | ICD-10-CM

## 2023-06-01 PROBLEM — E87.1 HYPONATREMIA: Chronic | Status: ACTIVE | Noted: 2023-05-31

## 2023-06-01 LAB
ANION GAP SERPL CALCULATED.3IONS-SCNC: 4 MMOL/L (ref 4–13)
BACTERIA UR CULT: NORMAL
BASOPHILS # BLD AUTO: 0.03 THOUSANDS/ÂΜL (ref 0–0.1)
BASOPHILS NFR BLD AUTO: 1 % (ref 0–1)
BUN SERPL-MCNC: 11 MG/DL (ref 5–25)
CALCIUM SERPL-MCNC: 8.2 MG/DL (ref 8.4–10.2)
CHLORIDE SERPL-SCNC: 104 MMOL/L (ref 96–108)
CO2 SERPL-SCNC: 28 MMOL/L (ref 21–32)
CREAT SERPL-MCNC: 0.74 MG/DL (ref 0.6–1.3)
EOSINOPHIL # BLD AUTO: 0.13 THOUSAND/ÂΜL (ref 0–0.61)
EOSINOPHIL NFR BLD AUTO: 2 % (ref 0–6)
ERYTHROCYTE [DISTWIDTH] IN BLOOD BY AUTOMATED COUNT: 13.5 % (ref 11.6–15.1)
GFR SERPL CREATININE-BSD FRML MDRD: 89 ML/MIN/1.73SQ M
GLUCOSE SERPL-MCNC: 108 MG/DL (ref 65–140)
HCT VFR BLD AUTO: 33.9 % (ref 36.5–49.3)
HGB BLD-MCNC: 11.2 G/DL (ref 12–17)
IMM GRANULOCYTES # BLD AUTO: 0.09 THOUSAND/UL (ref 0–0.2)
IMM GRANULOCYTES NFR BLD AUTO: 2 % (ref 0–2)
LYMPHOCYTES # BLD AUTO: 1.44 THOUSANDS/ÂΜL (ref 0.6–4.47)
LYMPHOCYTES NFR BLD AUTO: 24 % (ref 14–44)
MCH RBC QN AUTO: 30.3 PG (ref 26.8–34.3)
MCHC RBC AUTO-ENTMCNC: 33 G/DL (ref 31.4–37.4)
MCV RBC AUTO: 92 FL (ref 82–98)
MONOCYTES # BLD AUTO: 0.6 THOUSAND/ÂΜL (ref 0.17–1.22)
MONOCYTES NFR BLD AUTO: 10 % (ref 4–12)
NEUTROPHILS # BLD AUTO: 3.85 THOUSANDS/ÂΜL (ref 1.85–7.62)
NEUTS SEG NFR BLD AUTO: 61 % (ref 43–75)
NRBC BLD AUTO-RTO: 0 /100 WBCS
PLATELET # BLD AUTO: 215 THOUSANDS/UL (ref 149–390)
PMV BLD AUTO: 9.8 FL (ref 8.9–12.7)
POTASSIUM SERPL-SCNC: 4.2 MMOL/L (ref 3.5–5.3)
RBC # BLD AUTO: 3.7 MILLION/UL (ref 3.88–5.62)
SODIUM SERPL-SCNC: 136 MMOL/L (ref 135–147)
WBC # BLD AUTO: 6.14 THOUSAND/UL (ref 4.31–10.16)

## 2023-06-01 PROCEDURE — 80048 BASIC METABOLIC PNL TOTAL CA: CPT

## 2023-06-01 PROCEDURE — 85025 COMPLETE CBC W/AUTO DIFF WBC: CPT

## 2023-06-01 PROCEDURE — 99239 HOSP IP/OBS DSCHRG MGMT >30: CPT | Performed by: INTERNAL MEDICINE

## 2023-06-01 PROCEDURE — NC001 PR NO CHARGE: Performed by: UROLOGY

## 2023-06-01 RX ADMIN — SODIUM CHLORIDE 75 ML/HR: 0.9 INJECTION, SOLUTION INTRAVENOUS at 04:00

## 2023-06-01 RX ADMIN — POLYETHYLENE GLYCOL 3350 17 G: 17 POWDER, FOR SOLUTION ORAL at 08:40

## 2023-06-01 RX ADMIN — BACITRACIN ZINC, NEOMYCIN, POLYMYXIN B 1 SMALL APPLICATION: 400; 3.5; 5 OINTMENT TOPICAL at 08:40

## 2023-06-01 RX ADMIN — CEFTRIAXONE SODIUM 1000 MG: 10 INJECTION, POWDER, FOR SOLUTION INTRAVENOUS at 08:40

## 2023-06-01 NOTE — DISCHARGE INSTRUCTIONS
Dear Mariana Nolasco,     It was our pleasure to care for you here at [facility]  It is our hope that we were always able to exceed the expected standards for your care during your stay  You were hospitalized due to hematuria  You were cared for on the 4th floor by Tamara Galan DO under the service of Jmaes Mcdonnell MD with the [facility] who covers for your primary care physician (PCP), Ford Carcamo DO, while you were hospitalized  If you have any questions or concerns related to this hospitalization, you may contact us at [phone]  For follow up as well as any medication refills, we recommend that you follow up with your primary care physician  A registered nurse will reach out to you by phone within a few days after your discharge to answer any additional questions that you may have after going home  However, at this time we provide for you here, the most important instructions/recommendations at discharge:     Notable Medication Adjustments -   None  Testing Required after Discharge -   Please follow up with your PCP to discuss incidental findings noted on CT scan  Important follow up information -   Please follow up with Urology in their outpatient office next week for navarro catheter removal  Schedule an appointment to be seen by your PCP, Ford Carcamo DO, for an office visit within 7-10 days after discharge to discuss hospital stay  Other Instructions -   Please return to the emergency department or contact urology if you notice worsening hematuria   Please review this entire after visit summary as additional general instructions including medication list, appointments, activity, diet, any pertinent wound care, and other additional recommendations from your care team that may be provided for you        Sincerely,     Tamara Galan DO

## 2023-06-01 NOTE — PLAN OF CARE
Problem: PAIN - ADULT  Goal: Verbalizes/displays adequate comfort level or baseline comfort level  Description: Interventions:  - Encourage patient to monitor pain and request assistance  - Assess pain using appropriate pain scale  - Administer analgesics based on type and severity of pain and evaluate response  - Implement non-pharmacological measures as appropriate and evaluate response  - Consider cultural and social influences on pain and pain management  - Notify physician/advanced practitioner if interventions unsuccessful or patient reports new pain  Outcome: Progressing     Problem: INFECTION - ADULT  Goal: Absence or prevention of progression during hospitalization  Description: INTERVENTIONS:  - Assess and monitor for signs and symptoms of infection  - Monitor lab/diagnostic results  - Monitor all insertion sites, i e  indwelling lines, tubes, and drains  - Monitor endotracheal if appropriate and nasal secretions for changes in amount and color  - [place] appropriate cooling/warming therapies per order  - Administer medications as ordered  - Instruct and encourage patient and family to use good hand hygiene technique  - Identify and instruct in appropriate isolation precautions for identified infection/condition  Outcome: Progressing  Goal: Absence of fever/infection during neutropenic period  Description: INTERVENTIONS:  - Monitor WBC    Outcome: Progressing     Problem: SAFETY ADULT  Goal: Patient will remain free of falls  Description: INTERVENTIONS:  - Educate patient/family on patient safety including physical limitations  - Instruct patient to call for assistance with activity   - Consult OT/PT to assist with strengthening/mobility   - Keep Call bell within reach  - Keep bed low and locked with side rails adjusted as appropriate  - Keep care items and personal belongings within reach  - Initiate and maintain comfort rounds  - Consider moving patient to room near nurses station  Outcome: Progressing  Goal: Maintain or return to baseline ADL function  Description: INTERVENTIONS:  -  Assess patient's ability to carry out ADLs; assess patient's baseline for ADL function and identify physical deficits which impact ability to perform ADLs (bathing, care of mouth/teeth, toileting, grooming, dressing, etc )  - Assess/evaluate cause of self-care deficits   - Assess range of motion  - Assess patient's mobility; develop plan if impaired  - Assess patient's need for assistive devices and provide as appropriate  - Encourage maximum independence but intervene and supervise when necessary  - Involve family in performance of ADLs  - Assess for home care needs following discharge   - Consider OT consult to assist with ADL evaluation and planning for discharge  - Provide patient education as appropriate  Outcome: Progressing  Goal: Maintains/Returns to pre admission functional level  Description: INTERVENTIONS:  - Perform BMAT or MOVE assessment daily    - Set and communicate daily mobility goal to care team and patient/family/caregiver  - Collaborate with rehabilitation services on mobility goals if consulted  - Perform Range of Motion 4 times a day  - Reposition patient every 2 hours    - Dangle patient 3 times a day  - Stand patient 3 times a day  - Ambulate patient 3 times a day  - Out of bed to chair 3 times a day   - Out of bed for meals 3 times a day  - Out of bed for toileting  - Record patient progress and toleration of activity level   Outcome: Progressing     Problem: DISCHARGE PLANNING  Goal: Discharge to home or other facility with appropriate resources  Description: INTERVENTIONS:  - Identify barriers to discharge w/patient and caregiver  - Arrange for needed discharge resources and transportation as appropriate  - Identify discharge learning needs (meds, wound care, etc )  - Arrange for interpretive services to assist at discharge as needed  - Refer to Case Management Department for coordinating discharge planning if the patient needs post-hospital services based on physician/advanced practitioner order or complex needs related to functional status, cognitive ability, or social support system  Outcome: Progressing     Problem: Knowledge Deficit  Goal: Patient/family/caregiver demonstrates understanding of disease process, treatment plan, medications, and discharge instructions  Description: Complete learning assessment and assess knowledge base    Interventions:  - Provide teaching at level of understanding  - Provide teaching via preferred learning methods  Outcome: Progressing

## 2023-06-01 NOTE — PLAN OF CARE
Problem: PAIN - ADULT  Goal: Verbalizes/displays adequate comfort level or baseline comfort level  Description: Interventions:  - Encourage patient to monitor pain and request assistance  - Assess pain using appropriate pain scale  - Administer analgesics based on type and severity of pain and evaluate response  - Implement non-pharmacological measures as appropriate and evaluate response  - Consider cultural and social influences on pain and pain management  - Notify physician/advanced practitioner if interventions unsuccessful or patient reports new pain  Outcome: Progressing     Problem: INFECTION - ADULT  Goal: Absence or prevention of progression during hospitalization  Description: INTERVENTIONS:  - Assess and monitor for signs and symptoms of infection  - Monitor lab/diagnostic results  - Monitor all insertion sites, i e  indwelling lines, tubes, and drains  - Monitor endotracheal if appropriate and nasal secretions for changes in amount and color  - [place] appropriate cooling/warming therapies per order  - Administer medications as ordered  - Instruct and encourage patient and family to use good hand hygiene technique  - Identify and instruct in appropriate isolation precautions for identified infection/condition  Outcome: Progressing  Goal: Absence of fever/infection during neutropenic period  Description: INTERVENTIONS:  - Monitor WBC    Outcome: Progressing     Problem: SAFETY ADULT  Goal: Patient will remain free of falls  Description: INTERVENTIONS:  - Educate patient/family on patient safety including physical limitations  - Instruct patient to call for assistance with activity   - Consult OT/PT to assist with strengthening/mobility   - Keep Call bell within reach  - Keep bed low and locked with side rails adjusted as appropriate  - Keep care items and personal belongings within reach  - Initiate and maintain comfort rounds  - Make Fall Risk Sign visible to staff  - Apply yellow socks and bracelet for high fall risk patients  - Consider moving patient to room near nurses station  Outcome: Progressing  Goal: Maintain or return to baseline ADL function  Description: INTERVENTIONS:  -  Assess patient's ability to carry out ADLs; assess patient's baseline for ADL function and identify physical deficits which impact ability to perform ADLs (bathing, care of mouth/teeth, toileting, grooming, dressing, etc )  - Assess/evaluate cause of self-care deficits   - Assess range of motion  - Assess patient's mobility; develop plan if impaired  - Assess patient's need for assistive devices and provide as appropriate  - Encourage maximum independence but intervene and supervise when necessary  - Involve family in performance of ADLs  - Assess for home care needs following discharge   - Consider OT consult to assist with ADL evaluation and planning for discharge  - Provide patient education as appropriate  Outcome: Progressing  Goal: Maintains/Returns to pre admission functional level  Description: INTERVENTIONS:  - Perform BMAT or MOVE assessment daily    - Set and communicate daily mobility goal to care team and patient/family/caregiver     - Collaborate with rehabilitation services on mobility goals if consulted  - Out of bed for toileting  - Record patient progress and toleration of activity level   Outcome: Progressing     Problem: DISCHARGE PLANNING  Goal: Discharge to home or other facility with appropriate resources  Description: INTERVENTIONS:  - Identify barriers to discharge w/patient and caregiver  - Arrange for needed discharge resources and transportation as appropriate  - Identify discharge learning needs (meds, wound care, etc )  - Arrange for interpretive services to assist at discharge as needed  - Refer to Case Management Department for coordinating discharge planning if the patient needs post-hospital services based on physician/advanced practitioner order or complex needs related to functional status, cognitive ability, or social support system  Outcome: Progressing     Problem: Knowledge Deficit  Goal: Patient/family/caregiver demonstrates understanding of disease process, treatment plan, medications, and discharge instructions  Description: Complete learning assessment and assess knowledge base    Interventions:  - Provide teaching at level of understanding  - Provide teaching via preferred learning methods  Outcome: Progressing

## 2023-06-01 NOTE — DISCHARGE SUMMARY
"[facility]  Discharge- Michelle Gustafson 1945, 70 y o  male MRN: [id number]  Unit/Bed#: S -01 Encounter: [id number]  Primary Care Provider: Aissatou Zuniga DO   Date and time admitted to hospital: 5/31/2023  5:10 AM    * Hematuria  Assessment & Plan  S/p prostatectomy 5/22 performed at [place] [address] by Dr Cheyanne Johns during which 48 bladder stones measuring 3 mm were evacuated, and large median lobe of prostate was resected  At that time procedure report indicated no complications  - On Saturday (5/27) patient noted feeling chills, c/o fever, presented to ED, UA showed WBC and occasional bacteria  /100, low sodium  Sent home with antibiotics, used x1 day, stopped when urine culture showed no infection  (Anticipated taking prophylactic antibiotic starting tomorrow for removal of Navarro on Friday )  -Yesterday patient has noted blood in urine  Midnight woke with darker urine \"like real blood  \"  Also noted painful pelvic cramps at that time and came to what he might feel when having diarrhea  CT Renal Protocol: 5 x 8 4 x 8 1 cm hematoma at the prostatectomy bed  No active extravasation noted  Diffusely thickened bladder wall  Small hyperdense focus likely reflecting a blood clot  Navarro in satisfactory position  Mild subcutaneous and minimal intrapelvic gas, likely postsurgical   · 5 mm right lower lobe nodule  Recommend comparison with prior outside studies  If not available, recommend baseline outpatient CT chest, and follow-up as per Fleischner Society criteria  · 1 4 cm pancreatic head cyst, not evident in 2017      - No leukocytosis, Hgb stable 11 5  - Lipase, Troponin, CK, Lactic, PT/INR, PTT - WNL  - UA: Innumerable WBCs, no bacteria    Trace leukocytes, nitrate negative  - ED: Received 1G ceftriaxone, navarro exchanged 2/2 poor draining with minimal clots noted during exchange, began continuous bladder irrigation    - Blood Cultures: No growth @ 24 Hrs  - Urine Culture: No " Growth <1000 cfu/mL    Plan:  · CBI Overnight, Clamped this morning  · Consult urology appreciate recommendations  · Daily CBC to monitor Hgb  · Prophylactic 1 g ceftriaxone daily  · Continue home tamsulosin QHS  · Home Ditropan PRN  · Per Urology, maintain navarro for the next week with outpatient f/u apt for removal in officer  · Cleared from urology standpoint for discharge and f/u with Dr Linda Jordan       Hypertension  Assessment & Plan  BP noted to be 206/99 on admission  Per patient this happens sometimes  On prior admission noted to be 200/100  Quickly normalized to < 165/85  Not prescribed antihypertensives PTA    Current Blood Pressure: 121/68    Plan   · 20 mg IV labetalol as needed for SBP greater than 190      Hyponatremia  Assessment & Plan  POA Na 129  Previously 131 on 5/27, 128 on 5/23, 135 on 4/25  ED: Received 750 mL and boli of normal saline with 125 mL/h running now    Results from last 7 days   Lab Units 06/01/23  0553 05/31/23  1210 05/31/23  0543 05/27/23  2335   SODIUM mmol/L 136 135 129* 131*     Possibly 2/2 to patient admitted extra hydration in setting of surgery    Plan:  · Continue 125 mL/h normal saline  · Trend Na w/ routine BMP            CAD in native artery  Assessment & Plan  History of 2 stents placed approximately 17 years ago  Patient maintained on 81 mg ASA PTA  Plan:  · Hold ASA 2/2 hematuria    Abnormal finding on CT scan  Assessment & Plan  Incidental Finding - 1 3 cm unilocular cyst noted on the pancreas during CT scanning      Plan:  Outpatient MRI for further evaluation      Medical Problems     Resolved Problems  Date Reviewed: 5/31/2023   None         Discharging Resident: Wili Hager DO  Discharging Attending: Rosa Azevedo MD  Primary Care Physician at Discharge: Melody Dobson, [address] [place] [address]      Admission Date: 5/31/2023  Discharge Date:06/01/23  Primary Discharge [facility]  • IP CONSULT TO UROLOGY     Procedures Performed:   • CBI    Operative Procedures Performed:  · None    Significant Findings / Test Results:   Labs:  Results from last 7 days   Lab Units 06/01/23  0553 05/31/23  0543 05/27/23  2335   EOS PCT % 2 1 2   HEMATOCRIT % 33 9* 34 0* 34 3*   HEMOGLOBIN g/dL 11 2* 11 5* 11 4*   LYMPHS PCT % 24 13* 23   MONOS PCT % 10 9 9   NEUTROS PCT % 61 76* 64   PLATELETS Thousands/uL 215 196 162   WBC Thousand/uL 6 14 10 07 6 02     Results from last 7 days   Lab Units 06/01/23  0553 05/31/23  1210 05/31/23  0543 05/27/23  2335   ANION GAP mmol/L 4 5 7 7   ALBUMIN g/dL  --   --  4 0 3 7   ALK PHOS U/L  --   --  45 41   ALT U/L  --   --  21 23   AST U/L  --   --  17 23   BUN mg/dL 11 9 13 12   CALCIUM mg/dL 8 2* 8 3* 8 9 8 8   CHLORIDE mmol/L 104 103 95* 97   CO2 mmol/L 28 27 27 27   CREATININE mg/dL 0 74 0 59* 0 71 0 67   GLUCOSE RANDOM mg/dL 108 90 104 99   POTASSIUM mmol/L 4 2 4 2 3 8 4 1   SODIUM mmol/L 136 135 129* 131*   TOTAL BILIRUBIN mg/dL  --   --  0 46 0 41     Results from last 7 days   Lab Units 05/31/23  0543 05/27/23  2335   INR  1 04 1 07   PTT seconds 24 29              Results from last 7 days   Lab Units 05/31/23  0551 05/27/23  2335   LACTIC ACID mmol/L 0 9 1 1   PROCALCITONIN ng/ml  --  <0 05            ]    Imaging:  XR chest 1 view portable    Result Date: 6/1/2023  Impression: No acute cardiopulmonary disease  Workstation performed: [id number]     CT renal protocol    Result Date: 5/31/2023  Impression: 5 x 8 4 x 8 1 cm hematoma at the prostatectomy bed  No active extravasation noted  Diffusely thickened bladder wall  Small hyperdense focus likely reflecting a blood clot  Mcgee in satisfactory position  Mild subcutaneous and minimal intrapelvic gas, likely postsurgical  5 mm right lower lobe nodule  Recommend comparison with prior outside studies  If not available, recommend baseline outpatient CT chest, and follow-up as per Fleischner Society criteria   1 4 cm pancreatic head cyst, not evident in 2017  Correlate for history of prior work-up  If none, recommend further evaluation with outpatient pancreatic protocol MRI  Chronic findings, as per the body of the report  The study was marked in [facility] for immediate notification  Workstation performed: [id number]        Incidental Findings:   · See CT Renal Protocol Above  · I reviewed the above mentioned incidental findings with the patient and/or family and they expressed understanding  Test Results Pending at Discharge (will require follow up):  Pending Studies (From admission, onward)    None         Pending Labs     Order Current Status    Blood culture #1 Preliminary result    Blood culture #2 Preliminary result          Outpatient Tests Requested:  None    Complications:  None    Reason for Admission: Hematuria    Hospital Course:   Rakan Walters is a 70 y o  male patient who originally presented to the hospital on 5/31/2023 due to  Hematuria  Patient presented for evaluation of hematuria in the setting of recent prostatectomy and evaculation of bladder stones with lysis of adhesions perfomed by Dr Dain Nunez on 5/22  Presented with worsening hematuria and suprapubic pressure  Since his procedure patient noted intermittent presence of gross hematuria, however night prior to arrival patient noted his navarro was bright red colored and had increased pain in his suprapubic region which prompted ED visit  On arrival patient had elevated blood pressures with initial BP of 200/100  ED initial lab work revealed hemoglobin of 11 5, no evidence of leukocytosis on CBC, CMP reveals hyponatremia of 129  Lipase, troponin, CK, lactic acid, PT/INR, PTT were all within normal limits  UA obtained showing trace leukocytes, 2+ protein, large occult blood, with microscopic urine revealing innumerable RBCs and WBCs  ED exchanged catheter due to poorly draining  Nursing reported obtained minimal clots prior to Navarro catheter exchange  Mcgee catheter draining light blush colored urine, no clots noted  CT renal protocol obtained which showed 5 x 8 4 x 8 1 centimeter hematoma at the prostatectomy bed  No active extravasation was noted  Diffusely thickened bladder wall  Small hyperdense focus reflecting a blood clot  Mcgee was noted to be in satisfactory position  Mild subcutaneous minimal intrapelvic gas was no stone noted likely postsurgical   As well as incidental findings consisting of a 1 4 cm pancreatic head cyst not evident 2017, and a 5 mm right lower lobe nodule  Patient was seen by urology in the ED and placed on continuous bladder irrigation overnight  Patient was admitted to the medical floor for continuous irrigation and to monitor hemoglobin levels  Patient was given prophylactic 1 g of ceftriaxone  Patient was started on IVF which resolved hyponatremia  After overnight CBI patient cleared from urology standpouint to discharge home  Blood and Urine Culures showed no growth  Patient was discharged home with close follow up instructions to be seen by Dr David Carroll next week for Mcgee removal        Please see above list of diagnoses and related plan for additional information  Condition at Discharge: fair    Discharge Day Visit / Exam:   Subjective: Patient seen and evaluated at bedside  Patient had CBI overnight, clamped this morning  Mcgee draining clear bluish color  From urology to be discharged home  Patient eager to go home  Denies any worsening abdominal pain or hematuria  Vitals: Blood Pressure: 121/68 (06/01/23 0730)  Pulse: 60 (06/01/23 0730)  Temperature: 98 8 °F (37 1 °C) (06/01/23 0730)  Temp Source: Oral (05/31/23 8013)  Respirations: 18 (06/01/23 0730)  SpO2: 96 % (06/01/23 0730)  Exam:   Physical Exam  Vitals and nursing note reviewed  Constitutional:       General: He is not in acute distress  Appearance: He is well-developed  He is not diaphoretic  HENT:      Head: Normocephalic and atraumatic  Nose: Nose normal    Eyes:      General: No scleral icterus  Right eye: No discharge  Left eye: No discharge  Conjunctiva/sclera: Conjunctivae normal    Cardiovascular:      Rate and Rhythm: Normal rate and regular rhythm  Heart sounds: Normal heart sounds  No murmur heard  No friction rub  No gallop  Pulmonary:      Effort: Pulmonary effort is normal  No respiratory distress  Breath sounds: Normal breath sounds  No wheezing  Chest:      Chest wall: No tenderness  Abdominal:      General: Bowel sounds are normal  There is no distension (Mildly distended)  Palpations: Abdomen is soft  Tenderness: There is no abdominal tenderness  Genitourinary:     Comments: Mcgee in place  Skin:     General: Skin is warm and dry  Neurological:      Mental Status: He is alert and oriented to person, place, and time  Discussion with Family: Updated  (wife) at bedside  Discharge instructions/Information to patient and family:   See after visit summary for information provided to patient and family  Provisions for Follow-Up Care:  See after visit summary for information related to follow-up care and any pertinent home health orders  Disposition:  Home    Planned Readmission: None    Discharge Medications:  See after visit summary for reconciled discharge medications provided to patient and/or family        **Please Note: This note may have been constructed using a voice recognition system**

## 2023-06-01 NOTE — ASSESSMENT & PLAN NOTE
History of 2 stents placed approximately 17 years ago    Patient maintained on 81 mg ASA PTA  Plan:  · Hold ASA 2/2 hematuria

## 2023-06-01 NOTE — INCIDENTAL FINDINGS
The following findings require follow up:  Radiographic finding    Finding: CT renal protocol - mpression: 5 x 8 4 x 8 1 cm hematoma at the prostatectomy bed  No active extravasation noted  Diffusely thickened bladder wall  Small hyperdense focus likely reflecting a blood clot  Mcgee in satisfactory position  Mild subcutaneous and minimal intrapelvic gas, likely postsurgical  5 mm right lower lobe nodule  Recommend comparison with prior outside studies  If not available, recommend baseline outpatient CT chest, and follow-up as per Fleischner Society criteria  1 4 cm pancreatic head cyst, not evident in 2017  Correlate for history of prior work-up  If none, recommend further evaluation with outpatient pancreatic protocol MRI  Chronic findings, as per the body of the report  The study was marked in [facility] for immediate notification   Workstation performed: [id number]     Follow up required: Yes with PCP  Follow up should be done within upcoming weeks  Please notify the following clinician to assist with the follow up:  Dr Allyosn Goddard, DO

## 2023-06-01 NOTE — TELEPHONE ENCOUNTER
Andrew Shaw is a 72-year-old status post prostatectomy, evacuation of bladder stones, lysis of adhesions on 5/22 with Dr Getachew Bal  Presented to the ED due to hematuria, CT showing hematoma in prostatectomy bed  Patient was scheduled for Mcgee catheter removal tomorrow  Please reschedule Mcgee catheter removal for later next week  Patient missed his appointment with Dr Getachew Bal due to hospitalization, please reschedule follow-up with Dr Getachew Bal

## 2023-06-01 NOTE — ASSESSMENT & PLAN NOTE
POA Na 129    Previously 131 on 5/27, 128 on 5/23, 135 on 4/25  ED: Received 750 mL and boli of normal saline with 125 mL/h running now    Results from last 7 days   Lab Units 06/01/23  0553 05/31/23  1210 05/31/23  0543 05/27/23  2335   SODIUM mmol/L 136 135 129* 131*     Possibly 2/2 to patient admitted extra hydration in setting of surgery    Plan:  · Continue 125 mL/h normal saline  · Trend Na w/ routine BMP

## 2023-06-01 NOTE — ASSESSMENT & PLAN NOTE
BP noted to be 206/99 on admission  Per patient this happens sometimes  On prior admission noted to be 200/100     Quickly normalized to < 165/85  Not prescribed antihypertensives PTA    Current Blood Pressure: 121/68    Plan   · 20 mg IV labetalol as needed for SBP greater than 190

## 2023-06-01 NOTE — PROGRESS NOTES
Progress Note - Urology  Michelle Gustafson 1945, 70 y o  male MRN: [id number]    Unit/Bed#: S -01 Encounter: [id number]      Assessment & Plan:  1  Hematuria  2  S/p prostatectomy  -Patient is status post prostatectomy, evacuation of bladder stones, lysis of adhesions on 522 with Dr Cheyanne Johns  -Presented to the ED yesterday with worsening hematuria  CT done showing 5 x 8 4 x 8 1 cm hematoma at prostatectomy bed  No active extravasation noted  Small hyperdense foci likely reflecting a blood clot  -Patient on CBI overnight, light blush colored  -CBI clamped this a m   -Irrigated catheter at bedside with return of light blush colored urine, no clots  -Hemoglobin 11 2, stable  -Vital signs stable, afebrile  -Urine culture no growth  -Blood cultures no growth  -Patient was scheduled for Mcgee catheter removal for tomorrow  We will reschedule for Mcgee catheter removal next week  -Will reschedule follow-up with Dr Cheyanne Johns, pathology review  -Patient stable from urology perspective for discharge     Subjective:   HPI: Patient seen at bedside this AM   No overnight events  Review of Systems   Constitutional: Negative for chills and fever  Respiratory: Negative for cough and shortness of breath  Cardiovascular: Negative for chest pain and palpitations  Gastrointestinal: Negative for abdominal pain and vomiting  Genitourinary: Positive for hematuria  Negative for dysuria  Musculoskeletal: Negative for arthralgias and back pain  Skin: Negative for color change and rash  Neurological: Negative for seizures and syncope  All other systems reviewed and are negative  Objective:  Vitals: Blood pressure 121/68, pulse 60, temperature 98 8 °F (37 1 °C), resp  rate 18, SpO2 96 %  ,There is no height or weight on file to calculate BMI  Physical Exam  Constitutional:       General: He is not in acute distress  Appearance: Normal appearance  He is normal weight   He is not ill-appearing or toxic-appearing  HENT:      Head: Normocephalic and atraumatic  Right Ear: External ear normal       Left Ear: External ear normal       Nose: Nose normal       Mouth/Throat:      Mouth: Mucous membranes are moist    Eyes:      General: No scleral icterus  Conjunctiva/sclera: Conjunctivae normal    Cardiovascular:      Rate and Rhythm: Normal rate  Pulses: Normal pulses  Pulmonary:      Effort: Pulmonary effort is normal    Abdominal:      General: Abdomen is flat  There is no distension  Palpations: Abdomen is soft  Tenderness: There is no abdominal tenderness  There is no guarding  Comments: Well healing surgical incisions   Genitourinary:     Comments: Mcgee catheter in place draining light blush colored urine  Musculoskeletal:         General: Normal range of motion  Cervical back: Normal range of motion  Skin:     General: Skin is warm  Findings: No rash  Neurological:      General: No focal deficit present  Mental Status: He is alert and oriented to person, place, and time  Mental status is at baseline  Psychiatric:         Mood and Affect: Mood normal          Behavior: Behavior normal          Thought Content:  Thought content normal          Judgment: Judgment normal          Labs:  Recent Labs     05/31/23  0543 06/01/23  0553   WBC 10 07 6 14     Recent Labs     05/31/23  0543 06/01/23  0553   HGB 11 5* 11 2*     Recent Labs     05/31/23  0543 06/01/23  0553   HCT 34 0* 33 9*     Recent Labs     05/31/23  0543 05/31/23  1210 06/01/23  0553   CREATININE 0 71 0 59* 0 74       History:  Past Medical History:   Diagnosis Date   • Bladder stones    • BPH (benign prostatic hyperplasia)    • Coronary artery disease    • Diverticulosis    • Myocardial infarction ([facility] [address] )    • Pink eye      Social History     Socioeconomic History   • Marital status: /Civil Union     Spouse name: None   • Number of children: None   • Years of education: None   • Highest education level: None   Occupational History   • None   Tobacco Use   • Smoking status: Never   • Smokeless tobacco: Never   Vaping Use   • Vaping Use: Never used   Substance and Sexual Activity   • Alcohol use: Yes     Comment: social   • Drug use: No   • Sexual activity: Not Currently     Partners: Female   Other Topics Concern   • None   Social History Narrative   • None     Social Determinants of Health     Financial Resource Strain: Low Risk  (1/12/2023)    Overall Financial Resource Strain (CARDIA)    • Difficulty of Paying Living Expenses: Not hard at all   Food Insecurity: Not on file   Transportation Needs: No Transportation Needs (1/12/2023)    PRAPARE - Transportation    • Lack of Transportation (Medical): No    • Lack of Transportation (Non-Medical):  No   Physical Activity: Not on file   Stress: Not on file   Social Connections: Not on file   Intimate Partner Violence: Not on file   Housing Stability: Not on file     Past Surgical History:   Procedure Laterality Date   • AORTIC VALVE SURGERY      Assessment   • CORONARY ANGIOPLASTY WITH STENT PLACEMENT     • CYSTOSCOPY  01/23/2014    Diagnostic   • JOINT REPLACEMENT Right    • KNEE ARTHROSCOPY     • PROSTATE BIOPSY  01/23/2014   • PROSTATECTOMY N/A 5/22/2023    Procedure: ROBOTIC ASSISTED SUB-TOTAL SUPRAPUBIC PROSTATECTOMY;  Surgeon: Trista Amaro MD;  Location: [facility];  Service: Urology   • REPLACEMENT TOTAL KNEE     • REPLACEMENT TOTAL KNEE       Family History   Problem Relation Age of Onset   • Other Father         Cardiac Disorder       Fabienne Nelson PA-C  Date: 6/1/2023 Time: 11:09 AM

## 2023-06-01 NOTE — ASSESSMENT & PLAN NOTE
"S/p prostatectomy 5/22 performed at [place] [address] by Dr Wen Rothman during which 50 bladder stones measuring 3 mm were evacuated, and large median lobe of prostate was resected  At that time procedure report indicated no complications  - On Saturday (5/27) patient noted feeling chills, c/o fever, presented to ED, UA showed WBC and occasional bacteria  /100, low sodium  Sent home with antibiotics, used x1 day, stopped when urine culture showed no infection  (Anticipated taking prophylactic antibiotic starting tomorrow for removal of Navarro on Friday )  -Yesterday patient has noted blood in urine  Midnight woke with darker urine \"like real blood  \"  Also noted painful pelvic cramps at that time and came to what he might feel when having diarrhea  CT Renal Protocol: 5 x 8 4 x 8 1 cm hematoma at the prostatectomy bed  No active extravasation noted  Diffusely thickened bladder wall  Small hyperdense focus likely reflecting a blood clot  Navarro in satisfactory position  Mild subcutaneous and minimal intrapelvic gas, likely postsurgical   · 5 mm right lower lobe nodule  Recommend comparison with prior outside studies  If not available, recommend baseline outpatient CT chest, and follow-up as per Fleischner Society criteria  · 1 4 cm pancreatic head cyst, not evident in 2017      - No leukocytosis, Hgb stable 11 5  - Lipase, Troponin, CK, Lactic, PT/INR, PTT - WNL  - UA: Innumerable WBCs, no bacteria    Trace leukocytes, nitrate negative  - ED: Received 1G ceftriaxone, navarro exchanged 2/2 poor draining with minimal clots noted during exchange, began continuous bladder irrigation    - Blood Cultures: No growth @ 24 Hrs  - Urine Culture: No Growth <1000 cfu/mL    Plan:  · CBI Overnight, Clamped this morning  · Consult urology appreciate recommendations  · Daily CBC to monitor Hgb  · Prophylactic 1 g ceftriaxone daily  · Continue home tamsulosin QHS  · Home Ditropan PRN  · Per Urology, maintain navarro for the next week " with outpatient f/u apt for removal in officer  · Cleared from urology standpoint for discharge and f/u with Dr Wen Rothman

## 2023-06-01 NOTE — ASSESSMENT & PLAN NOTE
Incidental Finding - 1 3 cm unilocular cyst noted on the pancreas during CT scanning      Plan:  Outpatient MRI for further evaluation

## 2023-06-02 ENCOUNTER — TRANSITIONAL CARE MANAGEMENT (OUTPATIENT)
Dept: FAMILY MEDICINE CLINIC | Facility: CLINIC | Age: 78
End: 2023-06-02

## 2023-06-02 LAB
BACTERIA BLD CULT: NORMAL
BACTERIA BLD CULT: NORMAL

## 2023-06-02 NOTE — TELEPHONE ENCOUNTER
Pt calling to get schedule for navarro removal     Pt rescheduled for follow up with Dr Heidi Perkins on 6/20    Pt can be reached at [phone]  VM can be left with appointment

## 2023-06-02 NOTE — TELEPHONE ENCOUNTER
Spoke to patient and appointment has been scheduled for catheter removal next week in the [facility] office  Location confirmed

## 2023-06-04 LAB
BACTERIA BLD CULT: NORMAL
BACTERIA BLD CULT: NORMAL

## 2023-06-05 LAB
BACTERIA BLD CULT: NORMAL
BACTERIA BLD CULT: NORMAL

## 2023-06-05 RX ORDER — CIPROFLOXACIN 500 MG/1
500 TABLET, FILM COATED ORAL EVERY 12 HOURS SCHEDULED
Qty: 6 TABLET | Refills: 0 | Status: SHIPPED | OUTPATIENT
Start: 2023-06-05 | End: 2023-06-08

## 2023-06-05 NOTE — TELEPHONE ENCOUNTER
Patient coming in for navarro removal on 6/8/23  Patient calling in asking if he still needs to take Cypro prior to this visit  He states he forgot to  antibiotic when it was sent last time  Patient requesting call back        CB: [phone]

## 2023-06-07 ENCOUNTER — OFFICE VISIT (OUTPATIENT)
Dept: FAMILY MEDICINE CLINIC | Facility: CLINIC | Age: 78
End: 2023-06-07
Payer: MEDICARE

## 2023-06-07 VITALS
BODY MASS INDEX: 23.1 KG/M2 | HEART RATE: 70 BPM | HEIGHT: 74 IN | SYSTOLIC BLOOD PRESSURE: 144 MMHG | DIASTOLIC BLOOD PRESSURE: 84 MMHG | OXYGEN SATURATION: 99 % | WEIGHT: 180 LBS | TEMPERATURE: 97.2 F

## 2023-06-07 DIAGNOSIS — R97.20 BENIGN PROSTATIC HYPERPLASIA WITH ELEVATED PROSTATE SPECIFIC ANTIGEN (PSA): Primary | ICD-10-CM

## 2023-06-07 DIAGNOSIS — N40.0 BENIGN PROSTATIC HYPERPLASIA WITH ELEVATED PROSTATE SPECIFIC ANTIGEN (PSA): Primary | ICD-10-CM

## 2023-06-07 DIAGNOSIS — K86.2 PANCREAS CYST: ICD-10-CM

## 2023-06-07 DIAGNOSIS — R91.1 PULMONARY NODULE: ICD-10-CM

## 2023-06-07 PROCEDURE — 99496 TRANSJ CARE MGMT HIGH F2F 7D: CPT | Performed by: FAMILY MEDICINE

## 2023-06-07 NOTE — PROGRESS NOTES
Subjective:   Chief Complaint   Patient presents with   • Transition of Care Management        Patient ID: Sherryll Scheuermann is a 70 y o  male  Patient is here after undergoing transurethral resection of the prostate  He has a Mcgee catheter in place and has occasional passage of clots  Has been seen in the emergency room twice once for suspected fever and second for gross hematuria  He is scheduled to follow-up with urology later in the week for a void trial   During his evaluation in the emergency room he had a CT which demonstrated the presence of a 5 mm right upper lobe nodule as well as a cyst on the head of the pancreas  These will both require follow-up imaging  The following portions of the patient's history were reviewed and updated as appropriate: allergies, current medications, past family history, past medical history, past social history, past surgical history and problem list     Review of Systems   Constitutional: Negative for activity change, appetite change, chills, diaphoresis, fatigue and unexpected weight change  HENT: Negative for congestion, ear discharge, ear pain, hearing loss, nosebleeds and rhinorrhea  Eyes: Negative for pain, redness, itching and visual disturbance  Respiratory: Negative for cough, choking, chest tightness and shortness of breath  Cardiovascular: Negative for chest pain and leg swelling  Gastrointestinal: Negative for abdominal pain, blood in stool, constipation, diarrhea and nausea  Endocrine: Negative for cold intolerance, polydipsia and polyphagia  Genitourinary: Positive for hematuria  Negative for dysuria, frequency and urgency  Musculoskeletal: Negative for arthralgias, back pain, gait problem, joint swelling, neck pain and neck stiffness  Skin: Negative for color change and rash  Allergic/Immunologic: Negative for environmental allergies and food allergies     Neurological: Negative for dizziness, tremors, seizures, speech "difficulty, numbness and headaches  Hematological: Negative for adenopathy  Does not bruise/bleed easily  Psychiatric/Behavioral: Negative for behavioral problems, dysphoric mood, hallucinations and self-injury  Objective:  Vitals:    06/07/23 1042   BP: 122/68   Pulse: 70   Temp: (!) 97 2 °F (36 2 °C)   TempSrc: Tympanic   SpO2: 99%   Weight: 81 6 kg (180 lb)   Height: 6' 2\" (1 88 m)      Physical Exam  Constitutional:       General: He is not in acute distress  Appearance: He is well-developed  He is not diaphoretic  HENT:      Head: Normocephalic and atraumatic  Right Ear: External ear normal       Left Ear: External ear normal       Nose: Nose normal       Mouth/Throat:      Pharynx: No oropharyngeal exudate  Eyes:      General: No scleral icterus  Right eye: No discharge  Left eye: No discharge  Conjunctiva/sclera: Conjunctivae normal       Pupils: Pupils are equal, round, and reactive to light  Neck:      Thyroid: No thyromegaly  Cardiovascular:      Rate and Rhythm: Normal rate and regular rhythm  Heart sounds: Normal heart sounds  No murmur heard  Pulmonary:      Effort: Pulmonary effort is normal       Breath sounds: Normal breath sounds  No wheezing or rales  Abdominal:      General: Bowel sounds are normal       Palpations: Abdomen is soft  There is no mass  Tenderness: There is no abdominal tenderness  There is no guarding  Musculoskeletal:         General: No tenderness  Normal range of motion  Cervical back: Normal range of motion and neck supple  Lymphadenopathy:      Cervical: No cervical adenopathy  Skin:     General: Skin is warm and dry  Neurological:      Mental Status: He is alert and oriented to person, place, and time  Deep Tendon Reflexes: Reflexes are normal and symmetric  Psychiatric:         Thought Content:  Thought content normal          Judgment: Judgment normal            Assessment/Plan:    No " problem-specific Assessment & Plan notes found for this encounter  Diagnoses and all orders for this visit:    Benign prostatic hyperplasia with elevated prostate specific antigen (PSA)    Pancreas cyst  -     MRI abdomen w wo contrast; Future    Pulmonary nodule  -     CT chest wo contrast; Future        He will get an MRI of the abdomen with attention to the pancreatic cyst now and a CT of the chest in 1 years time for follow-up    He will see urology for a void trial

## 2023-06-08 ENCOUNTER — TELEPHONE (OUTPATIENT)
Dept: UROLOGY | Facility: MEDICAL CENTER | Age: 78
End: 2023-06-08

## 2023-06-08 ENCOUNTER — PROCEDURE VISIT (OUTPATIENT)
Dept: UROLOGY | Facility: AMBULATORY SURGERY CENTER | Age: 78
End: 2023-06-08

## 2023-06-08 VITALS
BODY MASS INDEX: 23.23 KG/M2 | SYSTOLIC BLOOD PRESSURE: 148 MMHG | DIASTOLIC BLOOD PRESSURE: 72 MMHG | WEIGHT: 181 LBS | HEIGHT: 74 IN | HEART RATE: 58 BPM

## 2023-06-08 DIAGNOSIS — N40.0 BENIGN PROSTATIC HYPERPLASIA WITH ELEVATED PROSTATE SPECIFIC ANTIGEN (PSA): Primary | ICD-10-CM

## 2023-06-08 DIAGNOSIS — R97.20 BENIGN PROSTATIC HYPERPLASIA WITH ELEVATED PROSTATE SPECIFIC ANTIGEN (PSA): Primary | ICD-10-CM

## 2023-06-08 PROCEDURE — 99024 POSTOP FOLLOW-UP VISIT: CPT

## 2023-06-08 NOTE — PROGRESS NOTES
"6/8/2023    Marni Myers is a 70 y o  male  [id number]    Diagnosis:  Chief Complaint    Post-op; Navarro removal         Patient presents for navarro removal s/p RALP on 5/22/20232 with Dr Rodriguez Asa:  Follow up as scheduled for OV  Knows to call the office in the meantime with concerns  Procedure:  Catheter removed without difficulty after deflation of fully intact balloon without any complications or immediate concerns  Vitals:    06/08/23 0849   BP: 148/72   Pulse: 58   Weight: 82 1 kg (181 lb)   Height: 6' 2\" (1 88 m)     Patient was provided a RALP handout and reviewed  All questions answered  Advised to call the office in the afternoon with any difficulties with urination  He understands       Kendra Dugan RN  "

## 2023-06-08 NOTE — TELEPHONE ENCOUNTER
Patient called stating he was in the office this morning for navarro removal and he was to come in at 3 pm if any issues  He stated he is doing fine  He will not come for 3 pm  Appointment was reserved but not scheduled  Notified the office via teams

## 2023-06-16 ENCOUNTER — TELEPHONE (OUTPATIENT)
Dept: UROLOGY | Facility: CLINIC | Age: 78
End: 2023-06-16

## 2023-06-16 NOTE — TELEPHONE ENCOUNTER
I called the patient to see how he was doing  He is voiding with a strong stream   He has some brownish discoloration of his urine which I explained is likely blood clot breaking down based on his CT scan which showed prostatic bed hematoma  He reports a cramping sensation at the end of his void which I told him I cannot explain well but hope will improve with time  Otherwise he is doing fine  Denies any leakage  He has appointment to see me next week

## 2023-06-20 ENCOUNTER — OFFICE VISIT (OUTPATIENT)
Dept: UROLOGY | Facility: AMBULATORY SURGERY CENTER | Age: 78
End: 2023-06-20
Payer: MEDICARE

## 2023-06-20 VITALS
BODY MASS INDEX: 23.23 KG/M2 | OXYGEN SATURATION: 99 % | HEIGHT: 74 IN | DIASTOLIC BLOOD PRESSURE: 80 MMHG | HEART RATE: 60 BPM | SYSTOLIC BLOOD PRESSURE: 132 MMHG | WEIGHT: 181 LBS

## 2023-06-20 DIAGNOSIS — N40.0 BENIGN PROSTATIC HYPERPLASIA WITH ELEVATED PROSTATE SPECIFIC ANTIGEN (PSA): ICD-10-CM

## 2023-06-20 DIAGNOSIS — R30.9 PAIN EMPTYING BLADDER: ICD-10-CM

## 2023-06-20 DIAGNOSIS — R97.20 BENIGN PROSTATIC HYPERPLASIA WITH ELEVATED PROSTATE SPECIFIC ANTIGEN (PSA): ICD-10-CM

## 2023-06-20 DIAGNOSIS — N21.0 BLADDER CALCULI: Primary | ICD-10-CM

## 2023-06-20 LAB
POST-VOID RESIDUAL VOLUME, ML POC: 0 ML
SL AMB  POCT GLUCOSE, UA: ABNORMAL
SL AMB LEUKOCYTE ESTERASE,UA: POSITIVE
SL AMB POCT BILIRUBIN,UA: ABNORMAL
SL AMB POCT BLOOD,UA: ABNORMAL
SL AMB POCT CLARITY,UA: CLEAR
SL AMB POCT COLOR,UA: YELLOW
SL AMB POCT KETONES,UA: ABNORMAL
SL AMB POCT NITRITE,UA: ABNORMAL
SL AMB POCT PH,UA: 6
SL AMB POCT SPECIFIC GRAVITY,UA: 1.01
SL AMB POCT URINE PROTEIN: + 100
SL AMB POCT UROBILINOGEN: 0.2

## 2023-06-20 PROCEDURE — 81002 URINALYSIS NONAUTO W/O SCOPE: CPT | Performed by: UROLOGY

## 2023-06-20 PROCEDURE — 51798 US URINE CAPACITY MEASURE: CPT | Performed by: UROLOGY

## 2023-06-20 PROCEDURE — 99024 POSTOP FOLLOW-UP VISIT: CPT | Performed by: UROLOGY

## 2023-06-20 NOTE — ASSESSMENT & PLAN NOTE
The patient voices discomfort at the end of his voids with a cramping station the bladder  Not sure what the etiology of this is  We discussed role for observation versus antimuscarinic versus cystoscopy  The patient wants to hold off on medications and assess how he is doing in a few months  If he is still having issues would consider medication or cystoscopy

## 2023-06-20 NOTE — PROGRESS NOTES
Assessment/Plan:    Benign prostatic hyperplasia with elevated prostate specific antigen (PSA)  Patient status post robot-assisted simple prostatectomy  His pathology showed stromal hyperplasia and a small focus suspicious for potential low-grade cancer  Given the patient's age and these overall low risk findings I do not think anywhere other work-up is needed other than following his PSAs  He had his PSA drawn in January 2023  We will plan to repeat later this year or by January of next year  Bladder calculi  Almost 50 bladder stones removed at the time of surgery  Pain emptying bladder  The patient voices discomfort at the end of his voids with a cramping station the bladder  Not sure what the etiology of this is  We discussed role for observation versus antimuscarinic versus cystoscopy  The patient wants to hold off on medications and assess how he is doing in a few months  If he is still having issues would consider medication or cystoscopy  Subjective:      Patient ID: Grace Hood is a 70 y o  male  HPI    70 y  o  male with a history of BPH associated with large bladder stone burden and elevated PSA status post robot-assisted prostatectomy in May 9739 complicated by hematuria requiring readmission         The patient reports intermittent issues with weak stream and frequency urgency   He remains on terazosin 5 mg daily which is managed by his PCP     MRI in 2020 showed 160cc gland with bladder stones        Flexible cystoscopy September 2022 showed trilobar hypertrophy with severe trabeculations and approximately 50 the bladder stones measuring 5-10 mm in size   TRUS volume was approximately 150 cc      Patient also has a history of elevated PSA status post negative prostate biopsy x2 as well as multiparametric MRI of prostate in January 2020 with category 2 and 160 cc size and multiple bladder stones   Most recent PSA is 7 4 which is roughly stable over the last decade (range 4 5 - 7  5)  Prostate tissue from several positive prostatectomy was benign with a small focus suspicious for low-grade cancer  He underwent robot-assisted prostatectomy with removal of 4 dozen small bladder stones May 2023  He was discharged on postop day 1 but unfortunately returned to hospital 8 days later for hematuria requiring admission for 24 hours of monitoring  He now comes in for follow-up  He is voiding with a strong stream   He is emptying his bladder completely  He was having some brownish discolored urine but this has cleared  Urine dipstick today showed leukocyte esterase and heme positive but nitrite negative  He reports some cramping type sensation at the end of void otherwise no issues  This cramping sensation has improved slightly over time  He asks about getting back into heavier activities      The patient surgical history significant for aortic valve replacement and cardiac stents but he has not had abdominal surgery      He is not on blood thinners other than baby aspirin  Past Surgical History:   Procedure Laterality Date   • AORTIC VALVE SURGERY      Assessment   • CORONARY ANGIOPLASTY WITH STENT PLACEMENT     • CYSTOSCOPY  01/23/2014    Diagnostic   • JOINT REPLACEMENT Right    • KNEE ARTHROSCOPY     • PROSTATE BIOPSY  01/23/2014   • PROSTATECTOMY N/A 5/22/2023    Procedure: ROBOTIC ASSISTED SUB-TOTAL SUPRAPUBIC PROSTATECTOMY;  Surgeon: Catalino Childs MD;  Location: [facility];  Service: Urology   • REPLACEMENT TOTAL KNEE     • REPLACEMENT TOTAL KNEE          Past Medical History:   Diagnosis Date   • Bladder stones    • BPH (benign prostatic hyperplasia)    • Coronary artery disease    • Diverticulosis    • Myocardial infarction ([facility] [address] )    • Pink eye              Review of Systems   Constitutional: Negative for chills and fever  HENT: Negative for ear pain and sore throat  Eyes: Negative for pain and visual disturbance     Respiratory: Negative for cough and shortness of "breath  Cardiovascular: Negative for chest pain and palpitations  Gastrointestinal: Negative for abdominal pain and vomiting  Genitourinary: Negative for difficulty urinating, dysuria and hematuria  Musculoskeletal: Negative for arthralgias and back pain  Skin: Negative for color change and rash  Neurological: Negative for seizures and syncope  All other systems reviewed and are negative  Objective:      /80   Pulse 60   Ht 6' 2\" (1 88 m)   Wt 82 1 kg (181 lb)   SpO2 99%   BMI 23 24 kg/m²     Lab Results   Component Value Date    PSA 7 4 (H) 01/18/2023    PSA 6 1 (H) 07/09/2022    PSA 7 5 (H) 01/03/2022    PSA 6 2 (H) 12/11/2020    PSA 6 5 (H) 12/16/2019    PSA 6 2 (H) 12/07/2018    PSA 6 4 (H) 04/19/2017    PSA 6 2 (H) 12/09/2016    PSA 4 6 (H) 12/03/2015    PSA 5 6 (H) 04/10/2015          Physical Exam  Vitals reviewed  Constitutional:       Appearance: Normal appearance  He is normal weight  HENT:      Head: Normocephalic and atraumatic  Eyes:      Pupils: Pupils are equal, round, and reactive to light  Abdominal:      General: Abdomen is flat  Neurological:      General: No focal deficit present  Mental Status: He is alert and oriented to person, place, and time  Psychiatric:         Mood and Affect: Mood normal          Thought Content:  Thought content normal            Orders  Orders Placed This Encounter   Procedures   • POCT urine dip   • POCT Measure PVR     "

## 2023-06-20 NOTE — ASSESSMENT & PLAN NOTE
Patient status post robot-assisted simple prostatectomy  His pathology showed stromal hyperplasia and a small focus suspicious for potential low-grade cancer  Given the patient's age and these overall low risk findings I do not think anywhere other work-up is needed other than following his PSAs  He had his PSA drawn in January 2023  We will plan to repeat later this year or by January of next year

## 2023-07-09 ENCOUNTER — HOSPITAL ENCOUNTER (OUTPATIENT)
Dept: MRI IMAGING | Facility: HOSPITAL | Age: 78
Discharge: HOME | End: 2023-07-09
Payer: MEDICARE

## 2023-07-09 DIAGNOSIS — K86.2 PANCREAS CYST: ICD-10-CM

## 2023-07-09 PROCEDURE — G1004 CDSM NDSC: HCPCS

## 2023-07-09 PROCEDURE — 74183 MRI ABD W/O CNTR FLWD CNTR: CPT

## 2023-07-09 PROCEDURE — A9585 GADOBUTROL INJECTION: HCPCS | Performed by: FAMILY MEDICINE

## 2023-07-09 RX ADMIN — GADOBUTROL 8 ML: 604.72 INJECTION INTRAVENOUS at 15:41

## 2023-07-14 ENCOUNTER — TELEPHONE (OUTPATIENT)
Dept: FAMILY MEDICINE CLINIC | Facility: CLINIC | Age: 78
End: 2023-07-14

## 2023-07-17 ENCOUNTER — TELEPHONE (OUTPATIENT)
Dept: FAMILY MEDICINE CLINIC | Facility: CLINIC | Age: 78
End: 2023-07-17

## 2023-07-17 NOTE — TELEPHONE ENCOUNTER
----- Message from Tyrell Clemens DO sent at 7/14/2023  4:01 PM EDT -----  MRI of abdomen shows pancreatic cyst with no suspicious features. Follow-up is recommended in 2 years.

## 2023-07-30 ENCOUNTER — NURSE TRIAGE (OUTPATIENT)
Dept: OTHER | Facility: OTHER | Age: 78
End: 2023-07-30

## 2023-07-30 ENCOUNTER — HOSPITAL ENCOUNTER (EMERGENCY)
Facility: HOSPITAL | Age: 78
Discharge: HOME | End: 2023-07-30
Attending: EMERGENCY MEDICINE | Admitting: EMERGENCY MEDICINE
Payer: MEDICARE

## 2023-07-30 ENCOUNTER — TELEPHONE (OUTPATIENT)
Dept: OTHER | Facility: HOSPITAL | Age: 78
End: 2023-07-30

## 2023-07-30 VITALS
HEART RATE: 53 BPM | SYSTOLIC BLOOD PRESSURE: 188 MMHG | OXYGEN SATURATION: 98 % | DIASTOLIC BLOOD PRESSURE: 84 MMHG | TEMPERATURE: 97.6 F | RESPIRATION RATE: 20 BRPM

## 2023-07-30 DIAGNOSIS — R33.9 URINARY RETENTION: Primary | ICD-10-CM

## 2023-07-30 DIAGNOSIS — N39.0 UTI (URINARY TRACT INFECTION): ICD-10-CM

## 2023-07-30 DIAGNOSIS — N20.0 NEPHROLITHIASIS: Primary | ICD-10-CM

## 2023-07-30 LAB
ANION GAP SERPL CALCULATED.3IONS-SCNC: 4 MMOL/L
BACTERIA UR QL AUTO: ABNORMAL /HPF
BASOPHILS # BLD AUTO: 0.04 THOUSANDS/ÂΜL (ref 0–0.1)
BASOPHILS NFR BLD AUTO: 1 % (ref 0–1)
BILIRUB UR QL STRIP: NEGATIVE
BUN SERPL-MCNC: 13 MG/DL (ref 5–25)
CALCIUM SERPL-MCNC: 9.1 MG/DL (ref 8.4–10.2)
CHLORIDE SERPL-SCNC: 105 MMOL/L (ref 96–108)
CLARITY UR: CLEAR
CO2 SERPL-SCNC: 29 MMOL/L (ref 21–32)
COLOR UR: ABNORMAL
CREAT SERPL-MCNC: 0.79 MG/DL (ref 0.6–1.3)
EOSINOPHIL # BLD AUTO: 0.06 THOUSAND/ÂΜL (ref 0–0.61)
EOSINOPHIL NFR BLD AUTO: 1 % (ref 0–6)
ERYTHROCYTE [DISTWIDTH] IN BLOOD BY AUTOMATED COUNT: 13.1 % (ref 11.6–15.1)
GFR SERPL CREATININE-BSD FRML MDRD: 87 ML/MIN/1.73SQ M
GLUCOSE SERPL-MCNC: 107 MG/DL (ref 65–140)
GLUCOSE UR STRIP-MCNC: NEGATIVE MG/DL
HCT VFR BLD AUTO: 40.7 % (ref 36.5–49.3)
HGB BLD-MCNC: 13.7 G/DL (ref 12–17)
HGB UR QL STRIP.AUTO: ABNORMAL
IMM GRANULOCYTES # BLD AUTO: 0.02 THOUSAND/UL (ref 0–0.2)
IMM GRANULOCYTES NFR BLD AUTO: 1 % (ref 0–2)
KETONES UR STRIP-MCNC: NEGATIVE MG/DL
LEUKOCYTE ESTERASE UR QL STRIP: ABNORMAL
LYMPHOCYTES # BLD AUTO: 1.41 THOUSANDS/ÂΜL (ref 0.6–4.47)
LYMPHOCYTES NFR BLD AUTO: 33 % (ref 14–44)
MCH RBC QN AUTO: 30.4 PG (ref 26.8–34.3)
MCHC RBC AUTO-ENTMCNC: 33.7 G/DL (ref 31.4–37.4)
MCV RBC AUTO: 90 FL (ref 82–98)
MONOCYTES # BLD AUTO: 0.3 THOUSAND/ÂΜL (ref 0.17–1.22)
MONOCYTES NFR BLD AUTO: 7 % (ref 4–12)
NEUTROPHILS # BLD AUTO: 2.41 THOUSANDS/ÂΜL (ref 1.85–7.62)
NEUTS SEG NFR BLD AUTO: 57 % (ref 43–75)
NITRITE UR QL STRIP: NEGATIVE
NON-SQ EPI CELLS URNS QL MICRO: ABNORMAL /HPF
NRBC BLD AUTO-RTO: 0 /100 WBCS
PH UR STRIP.AUTO: 5.5 [PH]
PLATELET # BLD AUTO: 128 THOUSANDS/UL (ref 149–390)
PMV BLD AUTO: 10.5 FL (ref 8.9–12.7)
POTASSIUM SERPL-SCNC: 3.9 MMOL/L (ref 3.5–5.3)
PROT UR STRIP-MCNC: NEGATIVE MG/DL
RBC # BLD AUTO: 4.5 MILLION/UL (ref 3.88–5.62)
RBC #/AREA URNS AUTO: ABNORMAL /HPF
SODIUM SERPL-SCNC: 138 MMOL/L (ref 135–147)
SP GR UR STRIP.AUTO: 1.01 (ref 1–1.03)
UROBILINOGEN UR STRIP-ACNC: <2 MG/DL
WBC # BLD AUTO: 4.24 THOUSAND/UL (ref 4.31–10.16)
WBC #/AREA URNS AUTO: ABNORMAL /HPF

## 2023-07-30 PROCEDURE — 36415 COLL VENOUS BLD VENIPUNCTURE: CPT

## 2023-07-30 PROCEDURE — 87086 URINE CULTURE/COLONY COUNT: CPT

## 2023-07-30 PROCEDURE — 85025 COMPLETE CBC W/AUTO DIFF WBC: CPT

## 2023-07-30 PROCEDURE — 99284 EMERGENCY DEPT VISIT MOD MDM: CPT | Performed by: EMERGENCY MEDICINE

## 2023-07-30 PROCEDURE — 80048 BASIC METABOLIC PNL TOTAL CA: CPT

## 2023-07-30 PROCEDURE — 99284 EMERGENCY DEPT VISIT MOD MDM: CPT

## 2023-07-30 PROCEDURE — 81001 URINALYSIS AUTO W/SCOPE: CPT

## 2023-07-30 RX ORDER — CEPHALEXIN 250 MG/1
500 CAPSULE ORAL ONCE
Status: COMPLETED | OUTPATIENT
Start: 2023-07-30 | End: 2023-07-30

## 2023-07-30 RX ORDER — CEPHALEXIN 500 MG/1
500 CAPSULE ORAL EVERY 6 HOURS SCHEDULED
Qty: 20 CAPSULE | Refills: 0 | Status: SHIPPED | OUTPATIENT
Start: 2023-07-30 | End: 2023-08-04

## 2023-07-30 RX ADMIN — CEPHALEXIN 500 MG: 250 CAPSULE ORAL at 12:10

## 2023-07-30 NOTE — TELEPHONE ENCOUNTER
Regarding: trouble urinating  ----- Message from Antolin Arriola sent at 7/30/2023  8:18 AM EDT -----  " I had my catheter taken out a few weeks ago and everything was going fine but now I'm having trouble urinating."

## 2023-07-30 NOTE — DISCHARGE INSTRUCTIONS
Take Keflex as prescribed. Follow-up with Dr. Tamara Calderon. Return sooner to the ED if you experience worsening symptoms.

## 2023-07-30 NOTE — TELEPHONE ENCOUNTER
Reason for Disposition  • [1] Unable to urinate (or only a few drops) > 4 hours AND [2] bladder feels very full (e.g., palpable bladder or strong urge to urinate)    Answer Assessment - Initial Assessment Questions  1. SYMPTOM: "What's the main symptom you're concerned about?" (e.g., frequency, incontinence)      Only a few drops since early this morning. Last normal urine flow was yesterday evening. Has frequent urge to urinate but nothing is coming out. Is drinking fluids this morning. 2. ONSET: "When did the    start?"      Since this morning. 3. PAIN: "Is there any pain?" If Yes, ask: "How bad is it?" (Scale: 1-10; mild, moderate, severe)      No pain    4. CAUSE: "What do you think is causing the symptoms?"      Urinary retention. 5. OTHER SYMPTOMS: "Do you have any other symptoms?" (e.g., fever, flank pain, blood in urine, pain with urination)      No pain no fever.     Protocols used: [facility]

## 2023-07-30 NOTE — TELEPHONE ENCOUNTER
Yeison Tam is a 68-year-old male known to Dr. Darcie Houston for BPH status post subtotal prostatectomy presenting to [facility] with urinary retention 7/30 status post Mcgee catheter insertion. Will require outpatient visit later this week Wednesday or Thursday for trial of void. May need AP visit or with Dr. Darcie Houston to reevaluate--? Why retention again? Please contact patient with hospital follow-up for trial of void first visit and AP/AS visits second soon after.

## 2023-07-30 NOTE — ED PROVIDER NOTES
History  Chief Complaint   Patient presents with   • Urinary Retention     Reports unable to urinate since yesterday. Able to go but "3 drops". Hx BPH     81-year-old male presented for evaluation of urinary retention. Patient has a past medical history of BPH with partial prostatic resection surgery and on May 22nd followed by placement of urinary catheter after surgery which was removed on June 8th. Patient has been able to urinate normally up until June 22th when he noticed low  urine stream.  Patient reports the stream of his urine has been decreasing since June 22th. Yesterday, patient  was unable to urinate. He only had 3 drops of urine. Patient complain of suprapubic discomfort. Patient denies nausea, vomiting, diarrhea, fever, hematuria, or flank pain. Patient seen at bedside in no acute distress however uncomfortable. Prior to Admission Medications   Prescriptions Last Dose Informant Patient Reported? Taking?    ALPRAZolam (XANAX) 0.25 mg tablet  Self No No   Sig: Take 1 tablet (0.25 mg total) by mouth 3 (three) times a day   Patient taking differently: Take 0.25 mg by mouth 3 (three) times a day as needed   aspirin 81 MG tablet  Self Yes No   Sig: Take by mouth every other day   docusate sodium (COLACE) 100 mg capsule   No No   Sig: Take 1 capsule (100 mg total) by mouth 3 (three) times a day as needed for constipation for up to 7 days   oxyCODONE (Roxicodone) 5 immediate release tablet  Self No No   Sig: Take 1 tablet (5 mg total) by mouth every 4 (four) hours as needed for moderate pain for up to 10 doses Max Daily Amount: 30 mg   Patient not taking: Reported on 6/20/2023   oxybutynin (DITROPAN) 5 mg tablet   No No   Sig: Take 1 tablet (5 mg total) by mouth if needed (TID PRN) for up to 10 days   Patient not taking: Reported on 5/31/2023   polyethylene glycol (MIRALAX) 17 g packet   No No   Sig: Take 17 g by mouth daily for 7 days   simvastatin (ZOCOR) 20 mg tablet  Self No No   Sig: Take 1 tablet (20 mg total) by mouth daily at bedtime   terazosin (HYTRIN) 5 mg capsule  Self No No   Sig: TAKE 1 CAPSULE EVERY DAY   Patient taking differently: Take 5 mg by mouth daily at bedtime      Facility-Administered Medications: None       Past Medical History:   Diagnosis Date   • Bladder stones    • BPH (benign prostatic hyperplasia)    • Coronary artery disease    • Diverticulosis    • Myocardial infarction (HCC)    • Pink eye        Past Surgical History:   Procedure Laterality Date   • AORTIC VALVE SURGERY      Assessment   • CORONARY ANGIOPLASTY WITH STENT PLACEMENT     • CYSTOSCOPY  01/23/2014    Diagnostic   • JOINT REPLACEMENT Right    • KNEE ARTHROSCOPY     • PROSTATE BIOPSY  01/23/2014   • PROSTATECTOMY N/A 5/22/2023    Procedure: ROBOTIC ASSISTED SUB-TOTAL SUPRAPUBIC PROSTATECTOMY;  Surgeon: Zarina Tineo MD;  Location: [facility];  Service: Urology   • REPLACEMENT TOTAL KNEE     • REPLACEMENT TOTAL KNEE         Family History   Problem Relation Age of Onset   • Other Father         Cardiac Disorder     I have reviewed and agree with the history as documented. E-Cigarette/Vaping   • E-Cigarette Use Never User      E-Cigarette/Vaping Substances   • Nicotine No    • THC No    • CBD No    • Flavoring No    • Other No    • Unknown No      Social History     Tobacco Use   • Smoking status: Never   • Smokeless tobacco: Never   Vaping Use   • Vaping Use: Never used   Substance Use Topics   • Alcohol use: Yes     Comment: social   • Drug use: No        Review of Systems   Constitutional: Negative for chills and fever. HENT: Negative for ear pain and sore throat. Eyes: Negative for pain and visual disturbance. Respiratory: Negative for cough and shortness of breath. Cardiovascular: Negative for chest pain and palpitations. Gastrointestinal: Positive for abdominal pain (Suprapubic discomfort). Negative for vomiting. Genitourinary: Positive for difficulty urinating.  Negative for dysuria, flank pain, hematuria and penile pain. Musculoskeletal: Negative for arthralgias and back pain. Skin: Negative for color change and rash. Neurological: Negative for seizures and syncope. All other systems reviewed and are negative. Physical Exam  ED Triage Vitals   Temperature Pulse Respirations Blood Pressure SpO2   07/30/23 0920 07/30/23 0916 07/30/23 0916 07/30/23 0916 07/30/23 0916   97.6 °F (36.4 °C) 58 16 (!) 218/91 98 %      Temp Source Heart Rate Source Patient Position - Orthostatic VS BP Location FiO2 (%)   07/30/23 0920 07/30/23 0916 07/30/23 0916 07/30/23 0916 --   Oral Monitor Sitting Right arm       Pain Score       --                    Orthostatic Vital Signs  Vitals:    07/30/23 0916 07/30/23 1142   BP: (!) 218/91 (!) 188/84   Pulse: 58 (!) 53   Patient Position - Orthostatic VS: Sitting Sitting       Physical Exam  Vitals and nursing note reviewed. Constitutional:       General: He is not in acute distress. Appearance: He is well-developed. HENT:      Head: Normocephalic and atraumatic. Eyes:      Conjunctiva/sclera: Conjunctivae normal.   Cardiovascular:      Rate and Rhythm: Normal rate and regular rhythm. Heart sounds: No murmur heard. Pulmonary:      Effort: Pulmonary effort is normal. No respiratory distress. Breath sounds: Normal breath sounds. Abdominal:      General: There is distension (Suprapubic). Palpations: Abdomen is soft. Tenderness: There is abdominal tenderness (Suprapubic discomfort). There is no right CVA tenderness or left CVA tenderness. Comments: Suprapubic discomfort on palpation. Musculoskeletal:         General: No swelling. Cervical back: Neck supple. Skin:     General: Skin is warm and dry. Capillary Refill: Capillary refill takes less than 2 seconds. Neurological:      Mental Status: He is alert.    Psychiatric:         Mood and Affect: Mood normal.         ED Medications  Medications   cephalexin (KEFLEX) capsule 500 mg (500 mg Oral Given 7/30/23 1210)       Diagnostic Studies  Results Reviewed     Procedure Component Value Units Date/Time    Urine Microscopic [[id number]]  (Abnormal) Collected: 07/30/23 1114    Lab Status: Final result Specimen: Urine, Indwelling Mcgee Catheter Updated: 07/30/23 1141     RBC, UA 4-10 /hpf      WBC, UA 20-30 /hpf      Epithelial Cells None Seen /hpf      Bacteria, UA None Seen /hpf     Urine culture [[id number]] Collected: 07/30/23 1114    Lab Status:  In process Specimen: Urine, Indwelling Mcgee Catheter Updated: 07/30/23 1141    UA w Reflex to Microscopic w Reflex to Culture [[id number]]  (Abnormal) Collected: 07/30/23 1114    Lab Status: Final result Specimen: Urine, Indwelling Mcgee Catheter Updated: 07/30/23 1135     Color, UA Light Yellow     Clarity, UA Clear     Specific Gravity, UA 1.012     pH, UA 5.5     Leukocytes, UA Moderate     Nitrite, UA Negative     Protein, UA Negative mg/dl      Glucose, UA Negative mg/dl      Ketones, UA Negative mg/dl      Urobilinogen, UA <2.0 mg/dl      Bilirubin, UA Negative     Occult Blood, UA Trace    CBC and differential [[id number]]  (Abnormal) Collected: 07/30/23 0948    Lab Status: Final result Specimen: Blood from Arm, Right Updated: 07/30/23 1047     WBC 4.24 Thousand/uL      RBC 4.50 Million/uL      Hemoglobin 13.7 g/dL      Hematocrit 40.7 %      MCV 90 fL      MCH 30.4 pg      MCHC 33.7 g/dL      RDW 13.1 %      MPV 10.5 fL      Platelets 503 Thousands/uL      nRBC 0 /100 WBCs      Neutrophils Relative 57 %      Immat GRANS % 1 %      Lymphocytes Relative 33 %      Monocytes Relative 7 %      Eosinophils Relative 1 %      Basophils Relative 1 %      Neutrophils Absolute 2.41 Thousands/µL      Immature Grans Absolute 0.02 Thousand/uL      Lymphocytes Absolute 1.41 Thousands/µL      Monocytes Absolute 0.30 Thousand/µL      Eosinophils Absolute 0.06 Thousand/µL      Basophils Absolute 0.04 Thousands/µL     Basic metabolic panel [[id number]] Collected: 07/30/23 0948    Lab Status: Final result Specimen: Blood from Arm, Right Updated: 07/30/23 1019     Sodium 138 mmol/L      Potassium 3.9 mmol/L      Chloride 105 mmol/L      CO2 29 mmol/L      ANION GAP 4 mmol/L      BUN 13 mg/dL      Creatinine 0.79 mg/dL      Glucose 107 mg/dL      Calcium 9.1 mg/dL      eGFR 87 ml/min/1.73sq m     Narrative:      [place] guidelines for Chronic Kidney Disease (CKD):   •  Stage 1 with normal or high GFR (GFR > 90 mL/min/1.73 square meters)  •  Stage 2 Mild CKD (GFR = 60-89 mL/min/1.73 square meters)  •  Stage 3A Moderate CKD (GFR = 45-59 mL/min/1.73 square meters)  •  Stage 3B Moderate CKD (GFR = 30-44 mL/min/1.73 square meters)  •  Stage 4 Severe CKD (GFR = 15-29 mL/min/1.73 square meters)  •  Stage 5 End Stage CKD (GFR <15 mL/min/1.73 square meters)  Note: GFR calculation is accurate only with a steady state creatinine                 No orders to display         Procedures  Procedures      ED Course  ED Course as of 07/30/23 1839   Corky Bailey Jul 30, 2023   269 70-year-old male presented for evaluation of urinary retention. Patient has a prostate resection surgery due to BPH 2 months ago. 7069 Bladder scan remarkable for 442 mL of fluid. Due to recent history of surgery. Urology was consulted on whether Mcgee catheter should be placed. 8866 Ordered CBC, BMP, UA   1214 WBC(!): 4.24  Low. Decreased from 6.14 a-month ago. 1214 Urine Microscopic(!)  RBC 4-10, WBC 20-30, indicative of UTI. Treat with Keflex. 5898 Basic metabolic panel  Wnl, normal anion gap, no LEE   1217 Mcgee catheter in place with drainage of 400 mL of urine after urology consult. Per commendation of urology, patient will follow up outpatient with Dr. tSacia Acevedo. 1219 Patient discharged with 5 days prescription of Keflex for UTI. Return precautions discussed.                                        Medical Decision Making  Patient presents with:  Urinary Retention: Reports unable to urinate since yesterday. Able to go but "3 drops". Hx BPH      Patient seen and examined noted to have urinary retention. Temp:  (97.6 °F (36.4 °C)) 97.6 °F (36.4 °C)  HR:  (53-58) 53  Resp:  (16-20) 20  BP: (188-218)/(84-91) 188/84    Differential diagnosis includes but is not limited to ureteral strictures, urinary tract infection, stone, pyelonephritis among others.       Due to patient's history and presentation the following laboratory evidence was collected:  Recent Results (from the past 12 hour(s))  -CBC and differential:   Collection Time: 07/30/23  9:48 AM       Result                      Value             Ref Range           WBC                         4.24 (L)          4.31 - 10.16*       RBC                         4.50              3.88 - 5.62 *       Hemoglobin                  13.7              12.0 - 17.0 *       Hematocrit                  40.7              36.5 - 49.3 %       MCV                         90                82 - 98 fL          MCH                         30.4              26.8 - 34.3 *       MCHC                        33.7              31.4 - 37.4 *       RDW                         13.1              11.6 - 15.1 %       MPV                         10.5              8.9 - 12.7 fL       Platelets                   128 (L)           149 - 390 Th*       nRBC                        0                 /100 WBCs           Neutrophils Relative        57                43 - 75 %           Immat GRANS %               1                 0 - 2 %             Lymphocytes Relative        33                14 - 44 %           Monocytes Relative          7                 4 - 12 %            Eosinophils Relative        1                 0 - 6 %             Basophils Relative          1                 0 - 1 %             Neutrophils Absolute        2.41              1.85 - 7.62 *       Immature Grans Absolute     0.02              0.00 - 0.20 *       Lymphocytes Absolute        1.41              0.60 - 4.47 *       Monocytes Absolute          0.30              0.17 - 1.22 *       Eosinophils Absolute        0.06              0.00 - 0.61 *       Basophils Absolute          0.04              0.00 - 0.10 *  -Basic metabolic panel:   Collection Time: 07/30/23  9:48 AM       Result                      Value             Ref Range           Sodium                      138               135 - 147 mm*       Potassium                   3.9               3.5 - 5.3 mm*       Chloride                    105               96 - 108 mmo*       CO2                         29                21 - 32 mmol*       ANION GAP                   4                 mmol/L              BUN                         13                5 - 25 mg/dL        Creatinine                  0.79              0.60 - 1.30 *       Glucose                     107               65 - 140 mg/*       Calcium                     9.1               8.4 - 10.2 m*       eGFR                        87                ml/min/1.73s*  -UA w Reflex to Microscopic w Reflex to Culture:   Collection Time: 07/30/23 11:14 AM  Specimen: Urine, Indwelling Mcgee Catheter       Result                      Value             Ref Range           Color, UA                   Light Yellow                          Clarity, UA                 Clear                                 Specific [place], UA        1.012             1.003 - 1.030       pH, UA                      5.5               4.5, 5.0, 5.*       Leukocytes, UA              Moderate (A)      Negative            Nitrite, UA                 Negative          Negative            Protein, UA                 Negative          Negative mg/*       Glucose, UA                 Negative          Negative mg/*       Ketones, UA                 Negative          Negative mg/*       Urobilinogen, UA            <2.0              <2.0 mg/dl m*       Bilirubin, UA               Negative Negative            Occult Blood, UA            Trace (A)         Negative       -Urine Microscopic:   Collection Time: 07/30/23 11:14 AM       Result                      Value             Ref Range           RBC, UA                     4-10 (A)          None Seen, 1*       WBC, UA                     20-30 (A)         None Seen, 1*       Epithelial Cells            None Seen         None Seen, O*       Bacteria, UA                None Seen         None Seen, O*    Patient was administered:      Medication Administration - last 24 hours from 07/29/2023 1836 to   07/30/2023 1836       Date/Time Order Dose Route Action Action by     07/30/2023 1210 EDT cephalexin (KEFLEX) capsule 500 mg 500 mg Oral Given   Daniella Sosa RN        Patient appears well, is nontoxic appearing, he expresses understanding and agrees with plan of care at this time. In light of this patient would benefit from outpatient management. Discussed patient's case with inpatient urology who recommend patient follow-up outpatient with Dr. Mayo Kimble    Patient was rx'd: 5-days course of Keflex for UTI      Urinary retention: acute illness or injury  UTI (urinary tract infection): acute illness or injury  Amount and/or Complexity of Data Reviewed  Independent Historian: spouse  Labs: ordered. Decision-making details documented in ED Course. Risk  Prescription drug management.             Disposition  Final diagnoses:   Urinary retention   UTI (urinary tract infection)     Time reflects when diagnosis was documented in both MDM as applicable and the Disposition within this note     Time User Action Codes Description Comment    7/30/2023 11:50 AM Shittou, Darcy-Aziz Ever Add [R33.9] Urinary retention     7/30/2023 11:51 AM Shittou, Darcy-Aziz Ever Add [R33.8] Acute urinary retention     7/30/2023 11:51 AM Shittou, Darcy-Aziz Ever Remove [R33.8] Acute urinary retention     7/30/2023 11:51 AM Shittou, Darcy-Aziz Ever Add [N39.0] UTI (urinary tract infection)     7/30/2023 11:51 AM Shittawa, Darcy-Aziz Ever Modify [R33.9] Urinary retention     7/30/2023 11:51 AM Shittawa, Darcy-Aziz Ever Modify [N39.0] UTI (urinary tract infection)     7/30/2023 12:11 PM Shittou, Darcy-Aziz Ever Modify [R33.9] Urinary retention     7/30/2023 12:11 PM Shittawa, Darcy-Aziz Ever Modify [N39.0] UTI (urinary tract infection)       ED Disposition     ED Disposition   Discharge    Condition   Stable    Date/Time   Sun Jul 30, 2023 12:13 PM    Comment   Melissa Oneal discharge to home/self care.                Follow-up Information     Follow up With Specialties Details Why Zoraida Brock MD Urology Schedule an appointment as soon as possible for a visit   [facility]  [phone]            Discharge Medication List as of 7/30/2023 12:13 PM      START taking these medications    Details   cephalexin (KEFLEX) 500 mg capsule Take 1 capsule (500 mg total) by mouth every 6 (six) hours for 5 days, Starting Sun 7/30/2023, Until Fri 8/4/2023, Normal         CONTINUE these medications which have NOT CHANGED    Details   ALPRAZolam (XANAX) 0.25 mg tablet Take 1 tablet (0.25 mg total) by mouth 3 (three) times a day, Starting Tue 7/31/2018, Normal      aspirin 81 MG tablet Take by mouth every other day, Historical Med      docusate sodium (COLACE) 100 mg capsule Take 1 capsule (100 mg total) by mouth 3 (three) times a day as needed for constipation for up to 7 days, Starting Tue 5/23/2023, Until Wed 5/31/2023 at 2359, Normal      oxybutynin (DITROPAN) 5 mg tablet Take 1 tablet (5 mg total) by mouth if needed (TID PRN) for up to 10 days, Starting Tue 5/23/2023, Until Fri 6/2/2023 at 2359, Normal      oxyCODONE (Roxicodone) 5 immediate release tablet Take 1 tablet (5 mg total) by mouth every 4 (four) hours as needed for moderate pain for up to 10 doses Max Daily Amount: 30 mg, Starting Tue 5/23/2023, Normal      polyethylene glycol (MIRALAX) 17 g packet Take 17 g by mouth daily for 7 days, Starting Tue 5/23/2023, Until Wed 5/31/2023, Normal      simvastatin (ZOCOR) 20 mg tablet Take 1 tablet (20 mg total) by mouth daily at bedtime, Starting Fri 10/1/2021, Normal      terazosin (HYTRIN) 5 mg capsule TAKE 1 CAPSULE EVERY DAY, Normal               PDMP Review       Value Time User    PDMP Reviewed  Yes 5/31/2023  5:05 AM Rodrigo Prince MD           ED Provider  Attending physically available and evaluated Keven Frazier. I managed the patient along with the ED Attending.     Electronically Signed by         James Tong MD  07/30/23 5653

## 2023-07-31 LAB — BACTERIA UR CULT: NORMAL

## 2023-07-31 NOTE — TELEPHONE ENCOUNTER
Pt called asking if he should stay on the keflex not that the urine culture came back. Please advise.     [phone]

## 2023-07-31 NOTE — TELEPHONE ENCOUNTER
Called Adan Chew back and scheduled him for a void trial on Wednesday. Patient had prostatectomy on 5/22/23 with Dr Reyes Sidle - unsure why he is in retention. Patient also had over 50 bladder stones removed at the same time. He he scheduled for follow up with Dr Reyes Sidle on 9/28.  Please advise if any testing should be completed

## 2023-07-31 NOTE — TELEPHONE ENCOUNTER
Agree with nurse's plan and follow up with Dr. Ann Marie Sharif. Will also order renal ultrasound to reassess kidneys and bladder. Stone analysis identified primarily uric acid composition.

## 2023-07-31 NOTE — TELEPHONE ENCOUNTER
Called Alex Angeles back and reminded him of appointment Wednesday and also that it is recommended he have an u/s - he understood

## 2023-07-31 NOTE — TELEPHONE ENCOUNTER
Pt called and stated he was seen in ED yesterday for urinary retention and pt is requesting to be seen asap. Pt also has a UTI and has been put on keflex.     Pt call back-[phone]

## 2023-08-01 NOTE — ED ATTENDING ATTESTATION
7/30/2023  I, Ramiro Kwon MD, saw and evaluated the patient. I have discussed the patient with the resident/non-physician practitioner and agree with the resident's/non-physician practitioner's findings, Plan of Care, and MDM as documented in the resident's/non-physician practitioner's note, except where noted. All available labs and Radiology studies were reviewed. I was present for key portions of any procedure(s) performed by the resident/non-physician practitioner and I was immediately available to provide assistance. At this point I agree with the current assessment done in the Emergency Department. I have conducted an independent evaluation of this patient a history and physical is as follows:    72-year-old presenting with lower abdominal discomfort, urinary retention. Recent prostate surgery,  2 months ago. Retaining on ultrasound. Will place Mcgee. Check kidney function and urine.       ED Course         Critical Care Time  Procedures

## 2023-08-02 ENCOUNTER — PROCEDURE VISIT (OUTPATIENT)
Dept: UROLOGY | Facility: AMBULATORY SURGERY CENTER | Age: 78
End: 2023-08-02
Payer: MEDICARE

## 2023-08-02 VITALS
BODY MASS INDEX: 23.61 KG/M2 | HEART RATE: 64 BPM | DIASTOLIC BLOOD PRESSURE: 80 MMHG | HEIGHT: 74 IN | WEIGHT: 184 LBS | SYSTOLIC BLOOD PRESSURE: 154 MMHG

## 2023-08-02 DIAGNOSIS — R33.9 URINARY RETENTION: ICD-10-CM

## 2023-08-02 LAB — POST-VOID RESIDUAL VOLUME, ML POC: 11 ML

## 2023-08-02 PROCEDURE — 99024 POSTOP FOLLOW-UP VISIT: CPT

## 2023-08-02 PROCEDURE — 51798 US URINE CAPACITY MEASURE: CPT

## 2023-08-02 NOTE — PROGRESS NOTES
8/2/2023    Remington Angelo  1945  [id number]    Diagnosis  Chief Complaint    void trial; Urinary Retention         Patient presents for void trial managed by Dr. Sharmaine Cr  Follow up as scheduled for OV   Knows to call the office in the meantime with concerns. Procedure Mcgee removal/voiding trial    Mcgee catheter removed after deflation of an intact balloon. Patient tolerated well. Encouraged patient to hydrate well and return this afternoon for post void residual.  he knows he may return early if uncomfortable and unable to urinate. Patient agrees to this plan. Patient returned this afternoon. Patient states able to void. Patient voided while in office. Bladder ultrasound performed and PVR measured 11ml.     Recent Results (from the past 4 hour(s))   POCT Measure PVR    Collection Time: 08/02/23  2:50 PM   Result Value Ref Range    POST-VOID RESIDUAL VOLUME, ML POC 11 mL           Vitals:    08/02/23 0829   BP: 154/80   Pulse: 64   Weight: 83.5 kg (184 lb)   Height: 6' 2" (1.88 m)           Chito Bloom RN

## 2023-08-04 ENCOUNTER — TELEPHONE (OUTPATIENT)
Dept: UROLOGY | Facility: CLINIC | Age: 78
End: 2023-08-04

## 2023-08-04 NOTE — TELEPHONE ENCOUNTER
I called and spoke to patient. He will come to [facility] on 8/8 at 6 with Dr. Deric Francisco. Date, time, and location have been reviewed.

## 2023-08-04 NOTE — TELEPHONE ENCOUNTER
I called and spoke with the patient. We discussed his course. It is very frustrating that he has had such difficulties voiding after surgery. At this time he is voiding with a moderately strong stream but not as strong as originally after surgery. There was also difficulty getting a catheter in an emergency room on July 30 which makes me concerned for possible stricture formation. I think we need to perform cystoscopy ASAP to figure out what is going on.

## 2023-08-04 NOTE — TELEPHONE ENCOUNTER
I tried to call the patient check in on how he was doing. I received his voicemail. I left a message. I will try him again later.

## 2023-08-05 ENCOUNTER — HOSPITAL ENCOUNTER (OUTPATIENT)
Dept: ULTRASOUND IMAGING | Facility: HOSPITAL | Age: 78
Discharge: HOME | End: 2023-08-05
Payer: MEDICARE

## 2023-08-05 DIAGNOSIS — N20.0 NEPHROLITHIASIS: ICD-10-CM

## 2023-08-05 PROCEDURE — 76775 US EXAM ABDO BACK WALL LIM: CPT

## 2023-08-08 ENCOUNTER — PROCEDURE VISIT (OUTPATIENT)
Dept: UROLOGY | Facility: CLINIC | Age: 78
End: 2023-08-08
Payer: MEDICARE

## 2023-08-08 VITALS
HEIGHT: 74 IN | HEART RATE: 72 BPM | BODY MASS INDEX: 23.82 KG/M2 | OXYGEN SATURATION: 98 % | DIASTOLIC BLOOD PRESSURE: 70 MMHG | RESPIRATION RATE: 16 BRPM | SYSTOLIC BLOOD PRESSURE: 120 MMHG | WEIGHT: 185.6 LBS

## 2023-08-08 DIAGNOSIS — N35.912 STRICTURE OF BULBOUS URETHRA IN MALE, UNSPECIFIED STRICTURE TYPE: Primary | ICD-10-CM

## 2023-08-08 PROCEDURE — 52000 CYSTOURETHROSCOPY: CPT | Performed by: UROLOGY

## 2023-08-08 NOTE — PROGRESS NOTES
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 Etiology is not clear I suspect is related to his postoperative bleeding and resultant healing since we typically do not access the bulbar urethra withdrawing robot simple prostatectomy itself. Discussed options but since the patient is on retention I am concerned the stricture will scar back down therefore recommend intervention. We discussed urethroplasty versus drug-coated balloon dilation which I think is a reasonable intervention to try first.  He agrees. Consent was obtained for cystoscopy with DVIU and specifically OptiLume balloon dilation. Subjective:      Patient ID: Leydi Ortega is a 70 y.o. male. HPI   70 y. o. male with a history of BPH associated with large bladder stone burden and elevated PSA status post robot-assisted prostatectomy in May 8456 complicated by hematuria requiring readmission and then by retention after.        The patient reports intermittent issues with weak stream and frequency urgency on terazosin 5mg.   MRI in 2020 showed 160cc gland with bladder stones.  Flexible cystoscopy September 2022 showed trilobar hypertrophy with severe trabeculations and approximately 50 the bladder stones measuring 5-10 mm in size.  TRUS volume was approximately 150 cc.     Patient also has a history of elevated PSA status post negative prostate biopsy x2 as well as multiparametric MRI of prostate in January 2020 with category 2 and 160 cc size and multiple bladder stones.  Most recent PSA is 7.4 which is roughly stable over the last decade (range 4.5 - 7.5). Prostate tissue from several positive prostatectomy was benign with a small focus suspicious for low-grade cancer.     He underwent robot-assisted prostatectomy with removal of 4 dozen small bladder stones May 2023.   He was discharged on postop day 1 but unfortunately returned to hospital 8 days later for hematuria requiring admission for 24 hours of monitoring. He was seen in the office June 20th 2023 and was voiding with a strong stream with some clearing brownish discolored urine and some cramping at the end of voids. He subsequently went into urinary retention on July 30 and there was apparently difficulty placing a catheter in the emergency room. Mcgee removed a few days later with PVR of 11cc. He subsequently has been voiding without difficulty although his stream is not as strong as it was originally after surgery. Cystoscopy today showed a 14 Swazi 1 cm bulbar stricture and areas of healing within the prostatic fossa but an open bladder neck      Past Surgical History:   Procedure Laterality Date   • AORTIC VALVE SURGERY      Assessment   • CORONARY ANGIOPLASTY WITH STENT PLACEMENT     • CYSTOSCOPY  01/23/2014    Diagnostic   • JOINT REPLACEMENT Right    • KNEE ARTHROSCOPY     • PROSTATE BIOPSY  01/23/2014   • PROSTATECTOMY N/A 5/22/2023    Procedure: ROBOTIC ASSISTED SUB-TOTAL SUPRAPUBIC PROSTATECTOMY;  Surgeon: Lynn Peters MD;  [facility];  Service: Urology   • REPLACEMENT TOTAL KNEE     • REPLACEMENT TOTAL KNEE          Past Medical History:   Diagnosis Date   • Bladder stones    • BPH (benign prostatic hyperplasia)    • Coronary artery disease    • Diverticulosis    • Myocardial infarction ([address])    • [place] eye              Review of Systems   Constitutional: Negative for chills and fever. HENT: Negative for ear pain and sore throat. Eyes: Negative for pain and visual disturbance. Respiratory: Negative for cough and shortness of breath. Cardiovascular: Negative for chest pain and palpitations. Gastrointestinal: Negative for abdominal pain and vomiting. Genitourinary: Positive for difficulty urinating. Negative for dysuria and hematuria. Musculoskeletal: Negative for arthralgias and back pain. Skin: Negative for color change and rash.    Neurological: Negative for seizures and syncope. All other systems reviewed and are negative. Objective:      /70 (BP Location: Left arm, Patient Position: Sitting, Cuff Size: Large)   Pulse 72   Resp 16   Ht 6' 2" (1.88 m)   Wt 84.2 kg (185 lb 9.6 oz)   SpO2 98%   BMI 23.83 kg/m²     Lab Results   Component Value Date    PSA 7.4 (H) 01/18/2023    PSA 6.1 (H) 07/09/2022    PSA 7.5 (H) 01/03/2022    PSA 6.2 (H) 12/11/2020    PSA 6.5 (H) 12/16/2019    PSA 6.2 (H) 12/07/2018    PSA 6.4 (H) 04/19/2017    PSA 6.2 (H) 12/09/2016    PSA 4.6 (H) 12/03/2015    PSA 5.6 (H) 04/10/2015          Physical Exam  Vitals reviewed. Constitutional:       Appearance: Normal appearance. He is normal weight. HENT:      Head: Normocephalic and atraumatic. Eyes:      Pupils: Pupils are equal, round, and reactive to light. Abdominal:      General: Abdomen is flat. Neurological:      General: No focal deficit present. Mental Status: He is alert and oriented to person, place, and time. Psychiatric:         Mood and Affect: Mood normal.         Thought Content: Thought content normal.                Cystoscopy     Date/Time 8/8/2023 11:00 AM     Performed by  Jennifer Rodriguez MD   Authorized by Jennifer Rodriguez MD     [place] Protocol:  Consent: Written consent obtained. Risks and benefits: risks, benefits and alternatives were discussed  Consent given by: patient  Time out: Immediately prior to procedure a "time out" was called to verify the correct patient, procedure, equipment, support staff and site/side marked as required.   Patient understanding: patient states understanding of the procedure being performed  Patient consent: the patient's understanding of the procedure matches consent given  Procedure consent: procedure consent matches procedure scheduled  Patient identity confirmed: verbally with patient        Procedure Details:  Procedure type: cystoscopy    Patient tolerance: Patient tolerated the procedure well with no immediate complications    Additional Procedure Details: A time-out was performed identifying the correct patient site and procedure. A MA chaperone was in the room. A flexible cystoscope was introduced into the urethra. The pendulous urethra had a 1 cm 14 Syrian bulbar urethral stricture which appeared atrophic in nature. The scope was able to gently push through this area. The prostatic urethra showed a wide open fossa with areas of brown-yellow healing tissue predominantly on the left side that was difficult to displace. The bladder did not have any lesions concerning for malignancy. There were mild slow up effect. Suture line on the back wall was seen to be in place and still healing. There were trabeculations  The ureteral orifices were in orthotopic position.       Orders  Orders Placed This Encounter   Procedures   • Cystoscopy     This order was created via procedure documentation

## 2023-08-08 NOTE — ASSESSMENT & PLAN NOTE
The patient has a relatively short bulbar urethral stricture. Etiology is not clear I suspect is related to his postoperative bleeding and resultant healing since we typically do not access the bulbar urethra withdrawing robot simple prostatectomy itself. Discussed options but since the patient is on retention I am concerned the stricture will scar back down therefore recommend intervention. We discussed urethroplasty versus drug-coated balloon dilation which I think is a reasonable intervention to try first.  He agrees. Consent was obtained for cystoscopy with DVIU and specifically OptiLume balloon dilation.     We will try to do this in the next 2-4 weeks if possible

## 2023-08-08 NOTE — H&P (VIEW-ONLY)
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 Etiology is not clear I suspect is related to his postoperative bleeding and resultant healing since we typically do not access the bulbar urethra withdrawing robot simple prostatectomy itself. Discussed options but since the patient is on retention I am concerned the stricture will scar back down therefore recommend intervention. We discussed urethroplasty versus drug-coated balloon dilation which I think is a reasonable intervention to try first.  He agrees. Consent was obtained for cystoscopy with DVIU and specifically OptiLume balloon dilation. Subjective:      Patient ID: Mikki Snyder is a 70 y.o. male. HPI   70 y. o. male with a history of BPH associated with large bladder stone burden and elevated PSA status post robot-assisted prostatectomy in May 9896 complicated by hematuria requiring readmission and then by retention after.        The patient reports intermittent issues with weak stream and frequency urgency on terazosin 5mg.   MRI in 2020 showed 160cc gland with bladder stones.  Flexible cystoscopy September 2022 showed trilobar hypertrophy with severe trabeculations and approximately 50 the bladder stones measuring 5-10 mm in size.  TRUS volume was approximately 150 cc.     Patient also has a history of elevated PSA status post negative prostate biopsy x2 as well as multiparametric MRI of prostate in January 2020 with category 2 and 160 cc size and multiple bladder stones.  Most recent PSA is 7.4 which is roughly stable over the last decade (range 4.5 - 7.5). Prostate tissue from several positive prostatectomy was benign with a small focus suspicious for low-grade cancer.     He underwent robot-assisted prostatectomy with removal of 4 dozen small bladder stones May 2023.   He was discharged on postop day 1 but unfortunately returned to hospital 8 days later for hematuria requiring admission for 24 hours of monitoring. He was seen in the office June 20th 2023 and was voiding with a strong stream with some clearing brownish discolored urine and some cramping at the end of voids. He subsequently went into urinary retention on July 30 and there was apparently difficulty placing a catheter in the emergency room. Mcgee removed a few days later with PVR of 11cc. He subsequently has been voiding without difficulty although his stream is not as strong as it was originally after surgery. Cystoscopy today showed a 14 Palestinian 1 cm bulbar stricture and areas of healing within the prostatic fossa but an open bladder neck      Past Surgical History:   Procedure Laterality Date   • AORTIC VALVE SURGERY      Assessment   • CORONARY ANGIOPLASTY WITH STENT PLACEMENT     • CYSTOSCOPY  01/23/2014    Diagnostic   • JOINT REPLACEMENT Right    • KNEE ARTHROSCOPY     • PROSTATE BIOPSY  01/23/2014   • PROSTATECTOMY N/A 5/22/2023    Procedure: ROBOTIC ASSISTED SUB-TOTAL SUPRAPUBIC PROSTATECTOMY;  Surgeon: Tammie Cassidy MD;  [facility];  Service: Urology   • REPLACEMENT TOTAL KNEE     • REPLACEMENT TOTAL KNEE          Past Medical History:   Diagnosis Date   • Bladder stones    • BPH (benign prostatic hyperplasia)    • Coronary artery disease    • Diverticulosis    • Myocardial infarction ([address])    • [place] eye              Review of Systems   Constitutional: Negative for chills and fever. HENT: Negative for ear pain and sore throat. Eyes: Negative for pain and visual disturbance. Respiratory: Negative for cough and shortness of breath. Cardiovascular: Negative for chest pain and palpitations. Gastrointestinal: Negative for abdominal pain and vomiting. Genitourinary: Positive for difficulty urinating. Negative for dysuria and hematuria. Musculoskeletal: Negative for arthralgias and back pain. Skin: Negative for color change and rash.    Neurological: Negative for seizures and syncope. All other systems reviewed and are negative. Objective:      /70 (BP Location: Left arm, Patient Position: Sitting, Cuff Size: Large)   Pulse 72   Resp 16   Ht 6' 2" (1.88 m)   Wt 84.2 kg (185 lb 9.6 oz)   SpO2 98%   BMI 23.83 kg/m²     Lab Results   Component Value Date    PSA 7.4 (H) 01/18/2023    PSA 6.1 (H) 07/09/2022    PSA 7.5 (H) 01/03/2022    PSA 6.2 (H) 12/11/2020    PSA 6.5 (H) 12/16/2019    PSA 6.2 (H) 12/07/2018    PSA 6.4 (H) 04/19/2017    PSA 6.2 (H) 12/09/2016    PSA 4.6 (H) 12/03/2015    PSA 5.6 (H) 04/10/2015          Physical Exam  Vitals reviewed. Constitutional:       Appearance: Normal appearance. He is normal weight. HENT:      Head: Normocephalic and atraumatic. Eyes:      Pupils: Pupils are equal, round, and reactive to light. Abdominal:      General: Abdomen is flat. Neurological:      General: No focal deficit present. Mental Status: He is alert and oriented to person, place, and time. Psychiatric:         Mood and Affect: Mood normal.         Thought Content: Thought content normal.                Cystoscopy     Date/Time 8/8/2023 11:00 AM     Performed by  Evangelina Jimenez MD   Authorized by Evangelina Jimenez MD     Universal Protocol:  Consent: Written consent obtained. Risks and benefits: risks, benefits and alternatives were discussed  Consent given by: patient  Time out: Immediately prior to procedure a "time out" was called to verify the correct patient, procedure, equipment, support staff and site/side marked as required.   Patient understanding: patient states understanding of the procedure being performed  Patient consent: the patient's understanding of the procedure matches consent given  Procedure consent: procedure consent matches procedure scheduled  Patient identity confirmed: verbally with patient        Procedure Details:  Procedure type: cystoscopy    Patient tolerance: Patient tolerated the procedure well with no immediate complications    Additional Procedure Details: A time-out was performed identifying the correct patient site and procedure. A MA chaperone was in the room. A flexible cystoscope was introduced into the urethra. The pendulous urethra had a 1 cm 14 Syrian bulbar urethral stricture which appeared atrophic in nature. The scope was able to gently push through this area. The prostatic urethra showed a wide open fossa with areas of brown-yellow healing tissue predominantly on the left side that was difficult to displace. The bladder did not have any lesions concerning for malignancy. There were mild slow up effect. Suture line on the back wall was seen to be in place and still healing. There were trabeculations  The ureteral orifices were in orthotopic position.       Orders  Orders Placed This Encounter   Procedures   • Cystoscopy     This order was created via procedure documentation

## 2023-08-10 ENCOUNTER — TELEPHONE (OUTPATIENT)
Dept: UROLOGY | Facility: MEDICAL CENTER | Age: 78
End: 2023-08-10

## 2023-08-10 DIAGNOSIS — N40.1 BPH WITH URINARY OBSTRUCTION: ICD-10-CM

## 2023-08-10 DIAGNOSIS — N13.8 BPH WITH URINARY OBSTRUCTION: ICD-10-CM

## 2023-08-10 RX ORDER — TERAZOSIN 5 MG/1
5 CAPSULE ORAL DAILY
Qty: 90 CAPSULE | Refills: 1 | Status: SHIPPED | OUTPATIENT
Start: 2023-08-10

## 2023-08-11 ENCOUNTER — TELEPHONE (OUTPATIENT)
Dept: UROLOGY | Facility: AMBULATORY SURGERY CENTER | Age: 78
End: 2023-08-11

## 2023-08-21 NOTE — TELEPHONE ENCOUNTER
Called Codie Palomares and notified him of results of u/s - which what was suspected at visit with Dr Leny Singer.   He  Was questioning surgery - and explained that Dr Jennifer Mims surgery scheduler will be contacting him once she has a date

## 2023-08-21 NOTE — TELEPHONE ENCOUNTER
Ultrasound of kidney and bladder reveal diffuse thickening of the bladder wall which is secondary to bladder outlet obstruction, prostate versus the narrowing down of his neck of his bladder. Otherwise unremarkable findings.   Patient should follow-up as scheduled with Dr. Mervin Melgar

## 2023-08-29 ENCOUNTER — TELEPHONE (OUTPATIENT)
Dept: UROLOGY | Facility: CLINIC | Age: 78
End: 2023-08-29

## 2023-08-29 ENCOUNTER — PREP FOR PROCEDURE (OUTPATIENT)
Dept: UROLOGY | Facility: CLINIC | Age: 78
End: 2023-08-29

## 2023-08-29 DIAGNOSIS — R39.89 SUSPECTED UTI: Primary | ICD-10-CM

## 2023-08-29 NOTE — TELEPHONE ENCOUNTER
Spoke to pt, scheduled OR procedure with Dr Coleen Pardo for 9/5 at [facility] while on [place] call per his request. Charla Backer pt to get UCX today.  Emailed all instructions

## 2023-08-30 ENCOUNTER — APPOINTMENT (OUTPATIENT)
Dept: LAB | Facility: CLINIC | Age: 78
End: 2023-08-30
Payer: MEDICARE

## 2023-08-30 PROCEDURE — 87086 URINE CULTURE/COLONY COUNT: CPT

## 2023-08-30 NOTE — PRE-PROCEDURE INSTRUCTIONS
Pre-Surgery Instructions:   Medication Instructions   • ALPRAZolam (XANAX) 0.25 mg tablet Uses PRN- OK to take day of surgery   • aspirin 81 MG tablet Pt stopped taking on 8/28/23   • simvastatin (ZOCOR) 20 mg tablet Take night before surgery   • terazosin (HYTRIN) 5 mg capsule Take night before surgery   Medication instructions for day surgery reviewed. Please use only a sip of water to take your instructed medications. Avoid all over the counter vitamins, supplements and NSAIDS for one week prior to surgery per anesthesia guidelines. Tylenol is ok to take as needed. You will receive a call one business day prior to surgery with an arrival time and hospital directions. If your surgery is scheduled on a Monday, the hospital will be calling you on the Friday prior to your surgery. If you have not heard from anyone by 8pm, please call the hospital supervisor through the hospital  at [phone]. Qian Every [phone]). Do not eat or drink anything after midnight the night before your surgery, including candy, mints, lifesavers, or chewing gum. Do not drink alcohol 24hrs before your surgery. Try not to smoke at least 24hrs before your surgery. Follow the pre surgery showering instructions as listed in the [facility] Surgical Experience Booklet” or otherwise provided by your surgeon's office. Do not shave the surgical area 24 hours before surgery. Do not apply any lotions, creams, including makeup, cologne, deodorant, or perfumes after showering on the day of your surgery. No contact lenses, eye make-up, or artificial eyelashes. Remove nail polish, including gel polish, and any artificial, gel, or acrylic nails if possible. Remove all jewelry including rings and body piercing jewelry. Wear causal clothing that is easy to take on and off. Consider your type of surgery. Keep any valuables, jewelry, piercings at home.  Please bring any specially ordered equipment (sling, braces) if indicated. Arrange for a responsible person to drive you to and from the hospital on the day of your surgery. Visitor Guidelines discussed. Call the surgeon's office with any new illnesses, exposures, or additional questions prior to surgery. Please reference your [facility] Surgical Experience Booklet” for additional information to prepare for your upcoming surgery.

## 2023-08-31 LAB — BACTERIA UR CULT: NORMAL

## 2023-08-31 NOTE — TELEPHONE ENCOUNTER
Per Dr. Wesley Fregoso I called pt to inform him that we are moving his surgery on 9/5/23 from the [facility] to the [facility]. Pt confirmed that this will work for him. He will wait to hear from the hospital regarding his arrival time.

## 2023-09-03 ENCOUNTER — ANESTHESIA EVENT (OUTPATIENT)
Dept: PERIOP | Facility: AMBULARY SURGERY CENTER | Age: 78
End: 2023-09-03
Payer: MEDICARE

## 2023-09-04 NOTE — ANESTHESIA PREPROCEDURE EVALUATION
Procedure:  Urethral stricture balloon dilation under fluoroscopic guidance (Urethra)    Relevant Problems   CARDIO   (+) CAD in native artery   (+) Hyperlipidemia   (+) Hypertension      GI/HEPATIC   (+) Esophageal reflux      /RENAL   (+) Benign prostatic hyperplasia with elevated prostate specific antigen (PSA)      HEMATOLOGY   (+) Thrombocytopenia (HCC)      NEURO/PSYCH   (+) Anxiety        Physical Exam    Airway    Mallampati score: II  TM Distance: >3 FB  Neck ROM: full     Dental   No notable dental hx     Cardiovascular  Cardiovascular exam normal    Pulmonary  Pulmonary exam normal     Other Findings     Sinus bradycardia with 1st degree A-V block  Incomplete right bundle branch block  Left anterior fascicular block  Septal infarct , age undetermined  Abnormal ECG        Anesthesia Plan  ASA Score- 3     Anesthesia Type- IV sedation with anesthesia with ASA Monitors. Additional Monitors:   Airway Plan: LMA. Plan Factors-Exercise tolerance (METS): >4 METS. Chart reviewed. EKG reviewed. Imaging results reviewed. Existing labs reviewed. Patient summary reviewed. Patient is not a current smoker. Patient not instructed to abstain from smoking on day of procedure. Patient did not smoke on day of surgery. Obstructive sleep apnea risk education given perioperatively. Induction- intravenous. Postoperative Plan-     Informed Consent- Anesthetic plan and risks discussed with patient. I personally reviewed this patient with the CRNA. Discussed and agreed on the Anesthesia Plan with the CRNA. Ana Holland

## 2023-09-05 ENCOUNTER — APPOINTMENT (OUTPATIENT)
Dept: RADIOLOGY | Facility: HOSPITAL | Age: 78
End: 2023-09-05
Payer: MEDICARE

## 2023-09-05 ENCOUNTER — TELEPHONE (OUTPATIENT)
Dept: UROLOGY | Facility: CLINIC | Age: 78
End: 2023-09-05

## 2023-09-05 ENCOUNTER — HOSPITAL ENCOUNTER (OUTPATIENT)
Facility: AMBULARY SURGERY CENTER | Age: 78
Setting detail: OUTPATIENT SURGERY
Discharge: HOME | End: 2023-09-05
Attending: UROLOGY | Admitting: UROLOGY
Payer: MEDICARE

## 2023-09-05 ENCOUNTER — ANESTHESIA (OUTPATIENT)
Dept: PERIOP | Facility: AMBULARY SURGERY CENTER | Age: 78
End: 2023-09-05
Payer: MEDICARE

## 2023-09-05 VITALS
OXYGEN SATURATION: 98 % | WEIGHT: 182 LBS | BODY MASS INDEX: 23.37 KG/M2 | TEMPERATURE: 97.1 F | SYSTOLIC BLOOD PRESSURE: 170 MMHG | RESPIRATION RATE: 16 BRPM | HEART RATE: 51 BPM | DIASTOLIC BLOOD PRESSURE: 83 MMHG

## 2023-09-05 PROCEDURE — 74450 X-RAY URETHRA/BLADDER: CPT

## 2023-09-05 PROCEDURE — C1726 CATH, BAL DIL, NON-VASCULAR: HCPCS | Performed by: UROLOGY

## 2023-09-05 PROCEDURE — C1769 GUIDE WIRE: HCPCS | Performed by: UROLOGY

## 2023-09-05 PROCEDURE — 52281 CYSTOSCOPY AND TREATMENT: CPT | Performed by: UROLOGY

## 2023-09-05 RX ORDER — CEFAZOLIN SODIUM 2 G/50ML
2000 SOLUTION INTRAVENOUS ONCE
Status: COMPLETED | OUTPATIENT
Start: 2023-09-05 | End: 2023-09-05

## 2023-09-05 RX ORDER — ACETAMINOPHEN 325 MG/1
975 TABLET ORAL ONCE
Status: COMPLETED | OUTPATIENT
Start: 2023-09-05 | End: 2023-09-05

## 2023-09-05 RX ORDER — SODIUM CHLORIDE, SODIUM LACTATE, POTASSIUM CHLORIDE, CALCIUM CHLORIDE 600; 310; 30; 20 MG/100ML; MG/100ML; MG/100ML; MG/100ML
INJECTION, SOLUTION INTRAVENOUS CONTINUOUS PRN
Status: DISCONTINUED | OUTPATIENT
Start: 2023-09-05 | End: 2023-09-05

## 2023-09-05 RX ORDER — FENTANYL CITRATE 50 UG/ML
INJECTION, SOLUTION INTRAMUSCULAR; INTRAVENOUS AS NEEDED
Status: DISCONTINUED | OUTPATIENT
Start: 2023-09-05 | End: 2023-09-05

## 2023-09-05 RX ORDER — ONDANSETRON 2 MG/ML
4 INJECTION INTRAMUSCULAR; INTRAVENOUS ONCE AS NEEDED
Status: DISCONTINUED | OUTPATIENT
Start: 2023-09-05 | End: 2023-09-05 | Stop reason: HOSPADM

## 2023-09-05 RX ORDER — PROPOFOL 10 MG/ML
INJECTION, EMULSION INTRAVENOUS AS NEEDED
Status: DISCONTINUED | OUTPATIENT
Start: 2023-09-05 | End: 2023-09-05

## 2023-09-05 RX ORDER — PROPOFOL 10 MG/ML
INJECTION, EMULSION INTRAVENOUS CONTINUOUS PRN
Status: DISCONTINUED | OUTPATIENT
Start: 2023-09-05 | End: 2023-09-05

## 2023-09-05 RX ORDER — FENTANYL CITRATE/PF 50 MCG/ML
25 SYRINGE (ML) INJECTION
Status: DISCONTINUED | OUTPATIENT
Start: 2023-09-05 | End: 2023-09-05 | Stop reason: HOSPADM

## 2023-09-05 RX ADMIN — SODIUM CHLORIDE, SODIUM LACTATE, POTASSIUM CHLORIDE, AND CALCIUM CHLORIDE: .6; .31; .03; .02 INJECTION, SOLUTION INTRAVENOUS at 12:02

## 2023-09-05 RX ADMIN — PROPOFOL 50 MG: 10 INJECTION, EMULSION INTRAVENOUS at 12:13

## 2023-09-05 RX ADMIN — PROPOFOL 75 MCG/KG/MIN: 10 INJECTION, EMULSION INTRAVENOUS at 12:17

## 2023-09-05 RX ADMIN — CEFAZOLIN SODIUM 2000 MG: 2 SOLUTION INTRAVENOUS at 12:04

## 2023-09-05 RX ADMIN — FENTANYL CITRATE 50 MCG: 50 INJECTION, SOLUTION INTRAMUSCULAR; INTRAVENOUS at 12:04

## 2023-09-05 RX ADMIN — PROPOFOL 50 MG: 10 INJECTION, EMULSION INTRAVENOUS at 12:08

## 2023-09-05 RX ADMIN — ACETAMINOPHEN 975 MG: 325 TABLET, FILM COATED ORAL at 12:00

## 2023-09-05 RX ADMIN — PROPOFOL 100 MG: 10 INJECTION, EMULSION INTRAVENOUS at 12:04

## 2023-09-05 NOTE — INTERVAL H&P NOTE
H&P reviewed. After examining the patient I find no changes in the patients condition since the H&P had been written.     Vitals:    09/05/23 1142   BP: (!) 184/93   Pulse: 57   Resp: 16   Temp: 98.1 °F (36.7 °C)   SpO2: 98%     Consented (again) for cystoscopy with urethral stricture dilation with use of opitlume balloon  Urine cx negative

## 2023-09-05 NOTE — OP NOTE
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 (Active)   Site Assessment Skin intact 23 1243   Collection Container Standard drainage bag 23 1243   Securement Method Securing device (Describe) 23 1243   Number of days: 0       Anesthesia Type:   General    Operative Indications:  Patient with history of BPH and bladder stones resulting in robotic simple prostatectomy complicated by hematuria requiring readmission and then retention with subsequent cystoscopy showing a bulbar urethral stricture approximately 14 Libyan in size x1 cm in length    Operative Findings:  14 Libyan bulbar urethral stricture approximately 1 to 2 cm in length. Retrograde urethrogram performed. Prostatic fossa open with area of brown-yellow healing tissue on the left side and open bladder neck  Stricture was dilated with 30 Libyan Optilume balloon for 5 minutes under visual and fluoroscopic guidance. Complications:   None    Procedure and Technique: With the patient probably identified and informed consent obtained including the risks of bleeding, infection, urethral stricture, urinary incontinence, retrograde ejaculation, bladder perforation, and need for secondary procedures, patient was placed supine in the operating room theater. General anesthesia was administered. He was placed in a dorsal lithotomy position and sterilely prepped and draped in the usual fashion.      A 22 [place] cystoscope with 30 degree lens was introduced into the urethra and the stricture was appreciated in the bulbar urethra. It measured about 14 [place] in size. Difficult determine length visually. The scope was removed. A retrograde urethrogram performed using 18 Finnish catheter placed into the distal urethra. This showed a area of narrowing that appeared to be approximately 1 to 2 cm length although hard to tell because it was near the sphincter complex. Obvious filling defect in the prostatic fossa. The scope was reintroduced and a wire was passed through the stricture into the bladder under visual and fluoroscopic guidance. A flexible cystoscope was then passed into the urethra alongside the wire and was able to carefully navigate through the stricture resulting in mild dilation. The prostatic fossa was open. There was an area of healing brown-yellowish tissue on the left lateral aspect of the fossa. The bladder had significant trabeculations and multiple diverticula but otherwise unremarkable. The scope was removed and the OptiLume balloon was passed over the wire under fluoroscopic guidance and then the flexible cystoscope was again placed into urethra next to wire to visually confirm the balloon was across the area of stricture disease. The balloon was then inflated under visual and fluoroscopic guidance. A thin waist was seen which subsequently expanded. The balloon was seen to be correct location visually with the cystoscope. Flow was turned off before dilation and an effort to ensure that the drug coating on the balloon was not displaced. The balloon was kept up for 5 minutes. The scope was removed. The balloon was then taken down and the balloon was removed with minimal resistance along with a wire. A 12 Finnish silicone catheter was placed without difficulty into the bladder. Clear urine was drained. 20 cc were placed in the balloon.     Patient awakened from anesthesia having tolerated the procedure well. A qualified resident physician was not available. Patient Disposition:  PACU      Plan: Catheter be kept in place for 2 to 3 days. If he is otherwise doing we will plan to perform cystoscopy on the patient in 2 months.         SIGNATURE: Valerie Santos MD  DATE: September 5, 2023  TIME: 12:50 PM

## 2023-09-05 NOTE — TELEPHONE ENCOUNTER
The patient underwent OptiLume balloon dilation of bulbar urethral stricture disease today. 12 Welsh catheter in place with planned dwell time of 2 days.

## 2023-09-05 NOTE — DISCHARGE INSTR - AVS FIRST PAGE
Mr. Charo Ortega,    Your stricture surgery went well. We dilated the stricture with the balloon under x-ray and visual guidance. We will plan to remove your catheter in 2-3 days in the Urology Clinic. Some bloody urine is quite normal for up to 2 weeks after surgery. If the urine his bloody but clear at this is not concerning. However if it is bloody and thick like ketchup this is concerning and you should let us know. Feelings of having to urinate more often with urgency and having bladder spasms is very common after this kind surgery as it is your bladder's way of reacting to the surgery. If you are having difficulty voiding please let us know    Please call us if you have any questions or concerns, [phone].     Francisco,  Shai Vicente MD  [facility]

## 2023-09-05 NOTE — ANESTHESIA POSTPROCEDURE EVALUATION
Post-Op Assessment Note    CV Status:  Stable  Pain Score: 0    Pain management: adequate     Mental Status:  Arousable and sleepy   Hydration Status:  Stable   PONV Controlled:  Controlled   Airway Patency:  Patent   Two or more mitigation strategies used for obstructive sleep apnea   Post Op Vitals Reviewed: Yes      Staff: CRNA         No notable events documented.     BP  110/59   Temp 97.6   Pulse 66   Resp 14   SpO2 99

## 2023-09-06 NOTE — TELEPHONE ENCOUNTER
Called and advised patient of appt tomorrow.  He said he normally goes to [place] office but will come here tomorrow for the removal. Endorses no issues with catheter and has no questions or concerns at this time

## 2023-09-07 ENCOUNTER — PROCEDURE VISIT (OUTPATIENT)
Dept: UROLOGY | Facility: CLINIC | Age: 78
End: 2023-09-07

## 2023-09-07 VITALS
OXYGEN SATURATION: 99 % | HEIGHT: 74 IN | WEIGHT: 182 LBS | SYSTOLIC BLOOD PRESSURE: 136 MMHG | HEART RATE: 82 BPM | BODY MASS INDEX: 23.36 KG/M2 | DIASTOLIC BLOOD PRESSURE: 88 MMHG

## 2023-09-07 DIAGNOSIS — N35.912 STRICTURE OF BULBOUS URETHRA IN MALE, UNSPECIFIED STRICTURE TYPE: Primary | ICD-10-CM

## 2023-09-07 PROCEDURE — 99024 POSTOP FOLLOW-UP VISIT: CPT

## 2023-09-07 NOTE — PROGRESS NOTES
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 Gabo Servin  -follow up as scheduled with Dr. Yael Singh. Procedure Navarro removal/voiding trial    Navarro catheter removed after deflation of an intact balloon. Patient tolerated well. Encouraged patient to hydrate well and return this afternoon for post void residual if unable to urinate. He knows he may return early if uncomfortable and unable to urinate. Patient agrees to this plan.         Vitals:    09/07/23 0816   BP: 136/88   Pulse: 82   SpO2: 99%   Weight: 82.6 kg (182 lb)   Height: 6' 2" (1.88 m)           Jayce Todd LPN

## 2023-09-08 ENCOUNTER — TELEPHONE (OUTPATIENT)
Dept: UROLOGY | Facility: CLINIC | Age: 78
End: 2023-09-08

## 2023-09-12 ENCOUNTER — TELEPHONE (OUTPATIENT)
Dept: UROLOGY | Facility: CLINIC | Age: 78
End: 2023-09-12

## 2023-09-12 NOTE — TELEPHONE ENCOUNTER
I called pt to check on he is doing after optilume urethral balloon dilation. He says his stream is strong. He denies dysuria or hematuria or other issues. He is happy. He has an appt to see me in a few weeks.

## 2023-09-28 ENCOUNTER — OFFICE VISIT (OUTPATIENT)
Dept: UROLOGY | Facility: AMBULATORY SURGERY CENTER | Age: 78
End: 2023-09-28
Payer: MEDICARE

## 2023-09-28 VITALS
HEART RATE: 66 BPM | OXYGEN SATURATION: 97 % | HEIGHT: 74 IN | SYSTOLIC BLOOD PRESSURE: 146 MMHG | DIASTOLIC BLOOD PRESSURE: 88 MMHG | BODY MASS INDEX: 23.1 KG/M2 | RESPIRATION RATE: 18 BRPM | WEIGHT: 180 LBS

## 2023-09-28 DIAGNOSIS — N35.912 STRICTURE OF BULBOUS URETHRA IN MALE, UNSPECIFIED STRICTURE TYPE: Primary | ICD-10-CM

## 2023-09-28 LAB — POST-VOID RESIDUAL VOLUME, ML POC: 0 ML

## 2023-09-28 PROCEDURE — 99213 OFFICE O/P EST LOW 20 MIN: CPT | Performed by: UROLOGY

## 2023-09-28 PROCEDURE — 51798 US URINE CAPACITY MEASURE: CPT | Performed by: UROLOGY

## 2023-09-28 NOTE — ASSESSMENT & PLAN NOTE
The patient appears to be doing well after stricture dilation as he is voiding well with a strong stream and no issues. I would like to evaluate further with cystoscopy in 3 months. He is amenable to this.

## 2023-09-28 NOTE — PROGRESS NOTES
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 I would like to evaluate further with cystoscopy in 3 months. He is amenable to this. Subjective:      Patient ID: Delmer Sagastume is a 70 y.o. male. HPI    70 y. o. male with a history of BPH associated with large bladder stone burden and elevated PSA status post robot-assisted prostatectomy in May 3357 complicated by hematuria requiring readmission and then urethral stricture status post OptiLume balloon dilation.        The patient reports intermittent issues with weak stream and frequency urgency.  He remains on terazosin 5 mg daily which is managed by his PCP.    MRI in 2020 showed 160cc gland with bladder stones.       Flexible cystoscopy September 2022 showed trilobar hypertrophy with severe trabeculations and approximately 50 the bladder stones measuring 5-10 mm in size.  TRUS volume was approximately 150 cc.     Patient also has a history of elevated PSA status post negative prostate biopsy x2 as well as multiparametric MRI of prostate in January 2020 with category 2 and 160 cc size and multiple bladder stones.  Most recent PSA is 7.4 which is roughly stable over the last decade (range 4.5 - 7.5). Prostate tissue from several positive prostatectomy was benign with a small focus suspicious for low-grade cancer.     He underwent robot-assisted prostatectomy with removal of 4 dozen small bladder stones May 2023. He was discharged on postop day 1 but unfortunately returned to hospital 8 days later for hematuria requiring admission for 24 hours of monitoring.     At follow-up appointments he was a first voiding with a strong stream but began to have cramping issues and his stream got weaker resulting in trip to emergency room where difficult catheterization was performed on July 30, 2023.   He then had cystoscopy showing a 14 Swedish 1 cm bulbar urethral stricture. He underwent OptiLume balloon dilation September 5, 2023. He reports he is now voiding well. Very strong stream.  Almost no leakage. He is very happy with current results. PVR today 0 cc    Past Surgical History:   Procedure Laterality Date   • CORONARY ANGIOPLASTY WITH STENT PLACEMENT     • CYSTOSCOPY  01/23/2014    Diagnostic   • JOINT REPLACEMENT Right    • KNEE ARTHROSCOPY     • ND CYSTOURETHROSCOPY W/INTERNAL URETHROTOMY N/A 9/5/2023    Procedure: Urethral stricture balloon dilation under fluoroscopic guidance;  Surgeon: Alexsandra Terry MD;  Location: [facility];  Service: Urology   • PROSTATE BIOPSY  01/23/2014   • PROSTATECTOMY N/A 05/22/2023    Procedure: ROBOTIC ASSISTED SUB-TOTAL SUPRAPUBIC PROSTATECTOMY;  Surgeon: Alexsandra Terry MD;  Location[facility];  Service: Urology   • REPLACEMENT TOTAL KNEE     • REPLACEMENT TOTAL KNEE          Past Medical History:   Diagnosis Date   • Anxiety    • Bladder stones    • BPH (benign prostatic hyperplasia)    • Coronary artery disease    • Diverticulosis    • Hyperlipidemia    • Hypertension    • Myocardial infarction ([address])     2006   • [place] eye              Review of Systems   Constitutional: Negative for chills and fever. HENT: Negative for ear pain and sore throat. Eyes: Negative for pain and visual disturbance. Respiratory: Negative for cough and shortness of breath. Cardiovascular: Negative for chest pain and palpitations. Gastrointestinal: Negative for abdominal pain and vomiting. Genitourinary: Negative for dysuria and hematuria. Musculoskeletal: Negative for arthralgias and back pain. Skin: Negative for color change and rash. Neurological: Negative for seizures and syncope. All other systems reviewed and are negative.         Objective:      /88 (BP Location: Left arm, Patient Position: Sitting, Cuff Size: Adult)   Pulse 66   Resp 18   Ht 6' 2" (1.88 m)   Wt 81.6 kg (180 lb) SpO2 97%   BMI 23.11 kg/m²     Lab Results   Component Value Date    PSA 7.4 (H) 01/18/2023    PSA 6.1 (H) 07/09/2022    PSA 7.5 (H) 01/03/2022    PSA 6.2 (H) 12/11/2020    PSA 6.5 (H) 12/16/2019    PSA 6.2 (H) 12/07/2018    PSA 6.4 (H) 04/19/2017    PSA 6.2 (H) 12/09/2016    PSA 4.6 (H) 12/03/2015    PSA 5.6 (H) 04/10/2015          Physical Exam  Vitals reviewed. Constitutional:       Appearance: Normal appearance. He is normal weight. HENT:      Head: Normocephalic and atraumatic. Eyes:      Pupils: Pupils are equal, round, and reactive to light. Abdominal:      General: Abdomen is flat. Neurological:      General: No focal deficit present. Mental Status: He is alert and oriented to person, place, and time. Psychiatric:         Mood and Affect: Mood normal.         Thought Content:  Thought content normal.           Orders  Orders Placed This Encounter   Procedures   • POCT Measure PVR

## 2024-01-02 ENCOUNTER — PROCEDURE VISIT (OUTPATIENT)
Dept: UROLOGY | Facility: AMBULATORY SURGERY CENTER | Age: 79
End: 2024-01-02
Payer: MEDICARE

## 2024-01-02 VITALS
WEIGHT: 180 LBS | HEIGHT: 74 IN | SYSTOLIC BLOOD PRESSURE: 142 MMHG | HEART RATE: 56 BPM | DIASTOLIC BLOOD PRESSURE: 90 MMHG | BODY MASS INDEX: 23.1 KG/M2 | RESPIRATION RATE: 18 BRPM | OXYGEN SATURATION: 98 %

## 2024-01-02 DIAGNOSIS — N35.912 STRICTURE OF BULBOUS URETHRA IN MALE, UNSPECIFIED STRICTURE TYPE: Primary | ICD-10-CM

## 2024-01-02 DIAGNOSIS — R97.20 ELEVATED PSA: ICD-10-CM

## 2024-01-02 LAB

## 2024-01-02 PROCEDURE — 51798 US URINE CAPACITY MEASURE: CPT | Performed by: UROLOGY

## 2024-01-02 PROCEDURE — 52310 CYSTOSCOPY AND TREATMENT: CPT | Performed by: UROLOGY

## 2024-01-02 PROCEDURE — 82360 CALCULUS ASSAY QUANT: CPT | Performed by: UROLOGY

## 2024-01-02 PROCEDURE — 81002 URINALYSIS NONAUTO W/O SCOPE: CPT | Performed by: UROLOGY

## 2024-01-02 NOTE — PROGRESS NOTES
Assessment/Plan:    Elevated PSA  Path from robot simple did not show cancer (small focus suspicious).  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 Has recurred but mildly enough to not impact voiding.   I hope and expect it will not worsen from this point as he is about 3-4 months out from dilation.  Will see him back in 9-12 months with PVR.    He had soft tissue that is likely dystrophic calcification removed, will send for analysis          Subjective:      Patient ID: Juan Marcelo is a 78 y.o. male.    HPI    78 y.o. male with a history of BPH associated with large bladder stone burden and elevated PSA status post robot-assisted prostatectomy in May 2023 complicated by hematuria requiring readmission and then urethral stricture status post OptiLume balloon dilation in Sep 2023.        The patient reports intermittent issues with weak stream and frequency urgency.  He remains on terazosin 5 mg daily which is managed by his PCP.   MRI in 2020 showed 160cc gland with bladder stones.       Flexible cystoscopy September 2022 showed trilobar hypertrophy with severe trabeculations and approximately 50 the bladder stones measuring 5-10 mm in size.  TRUS volume was approximately 150 cc.     Patient also has a history of elevated PSA status post negative prostate biopsy x2 as well as multiparametric MRI of prostate in January 2020 with category 2 and 160 cc size and multiple bladder stones.  Most recent PSA is 7.4 which is roughly stable over the last decade (range 4.5 - 7.5).  Prostate tissue was benign with a small focus suspicious for low-grade cancer.     He underwent robot-assisted prostatectomy with removal of 4 dozen small bladder stones May 2023.  He was discharged on postop day 1 but unfortunately returned to hospital 8 days later for hematuria requiring admission for 24 hours of monitoring.     At follow-up appointments he was a first voiding with a strong stream  but began to have cramping issues and his stream got weaker resulting in trip to emergency room where difficult catheterization was performed on July 30, 2023.  He then had cystoscopy showing a 14 Dutch 1 cm bulbar urethral stricture.  He underwent OptiLume balloon dilation September 5, 2023.  He reports he is now voiding well.  Has a generally strong stream.  Almost no leakage.      Cysto today shows  recurrence of bulbar stricture but not as severe, about 16fr     PVR at Sep 2023 visit was 0 cc. today  Past Surgical History:   Procedure Laterality Date    CORONARY ANGIOPLASTY WITH STENT PLACEMENT      CYSTOSCOPY  01/23/2014    Diagnostic    JOINT REPLACEMENT Right     KNEE ARTHROSCOPY      ME CYSTOURETHROSCOPY W/INTERNAL URETHROTOMY N/A 9/5/2023    Procedure: Urethral stricture balloon dilation under fluoroscopic guidance;  Surgeon: Jai Infante MD;  Location: [facility] OR;  Service: Urology    PROSTATE BIOPSY  01/23/2014    PROSTATECTOMY N/A 05/22/2023    Procedure: ROBOTIC ASSISTED SUB-TOTAL SUPRAPUBIC PROSTATECTOMY;  Surgeon: Jai Infante MD;  [facility];  Service: Urology    REPLACEMENT TOTAL KNEE      REPLACEMENT TOTAL KNEE          Past Medical History:   Diagnosis Date    Anxiety     Bladder stones     BPH (benign prostatic hyperplasia)     Coronary artery disease     Diverticulosis     Hyperlipidemia     Hypertension     Myocardial infarction ([facility])     2006    Pink eye              Review of Systems   Constitutional:  Negative for chills and fever.   HENT:  Negative for ear pain and sore throat.    Eyes:  Negative for pain and visual disturbance.   Respiratory:  Negative for cough and shortness of breath.    Cardiovascular:  Negative for chest pain and palpitations.   Gastrointestinal:  Negative for abdominal pain and vomiting.   Genitourinary:  Negative for dysuria and hematuria.   Musculoskeletal:  Negative for arthralgias and back pain.   Skin:  Negative for color change and rash.  "  Neurological:  Negative for seizures and syncope.   All other systems reviewed and are negative.        Objective:      /90 (BP Location: Left arm, Patient Position: Sitting, Cuff Size: Adult)   Pulse 56   Resp 18   Ht 6' 2\" (1.88 m)   Wt 81.6 kg (180 lb)   SpO2 98%   BMI 23.11 kg/m²     Lab Results   Component Value Date    PSA 7.4 (H) 01/18/2023    PSA 6.1 (H) 07/09/2022    PSA 7.5 (H) 01/03/2022    PSA 6.2 (H) 12/11/2020    PSA 6.5 (H) 12/16/2019    PSA 6.2 (H) 12/07/2018    PSA 6.4 (H) 04/19/2017    PSA 6.2 (H) 12/09/2016    PSA 4.6 (H) 12/03/2015    PSA 5.6 (H) 04/10/2015          Physical Exam  Vitals reviewed.   Constitutional:       Appearance: Normal appearance. He is normal weight.   HENT:      Head: Normocephalic and atraumatic.   Eyes:      Pupils: Pupils are equal, round, and reactive to light.   Abdominal:      General: Abdomen is flat.   Neurological:      General: No focal deficit present.      Mental Status: He is alert and oriented to person, place, and time.   Psychiatric:         Mood and Affect: Mood normal.         Thought Content: Thought content normal.            Cystoscopy     Date/Time  1/2/2024 2:00 PM     Performed by  Jai Infante MD   Authorized by  Jai Infante MD     Universal Protocol:  Consent: Written consent obtained.  Risks and benefits: risks, benefits and alternatives were discussed  Consent given by: patient  Time out: Immediately prior to procedure a \"time out\" was called to verify the correct patient, procedure, equipment, support staff and site/side marked as required.  Patient understanding: patient states understanding of the procedure being performed  Patient consent: the patient's understanding of the procedure matches consent given  Procedure consent: procedure consent matches procedure scheduled  Patient identity confirmed: verbally with patient      Procedure Details:  Procedure type: simple removal of a foreign body, stone, or stent    Patient " tolerance: Patient tolerated the procedure well with no immediate complications    Additional Procedure Details: A time-out was performed identifying the correct patient site and procedure.  A MA chaperone was in the room.  A flexible cystoscope was introduced into the urethra.  The pendulous urethra shows 16-17fr bulbar stricture measuring 2-3cm in length. Able to gently pass scope through.    The prostatic fossa is open with yellow brown soft healing/stone like tissue.  These pieces of tissue were grasped and removed with a basket without issues.  The bladder did not have any lesions concerning for malignancy.  There were mild trabeculations and no diverticula.  The ureteral orifices were in orthotopic position.    Photos taken      Orders  Orders Placed This Encounter   Procedures    Cystoscopy     This order was created via procedure documentation    Stone analysis    POCT urine dip    POCT Measure PVR

## 2024-01-02 NOTE — ASSESSMENT & PLAN NOTE
This has healed decently well. Has recurred but mildly enough to not impact voiding.   I hope and expect it will not worsen from this point as he is about 3-4 months out from dilation.  Will see him back in 9-12 months with PVR.    He had soft tissue that is likely dystrophic calcification removed, will send for analysis

## 2024-01-09 LAB
CALCIUM OXALATE DIHYDRATE MFR STONE IR: 10 %
COLOR STONE: NORMAL
COM MFR STONE: 90 %
COMMENT-STONE3: NORMAL
COMPOSITION: NORMAL
LABORATORY COMMENT REPORT: NORMAL
PHOTO: NORMAL
SIZE STONE: NORMAL MM
SPEC SOURCE SUBJ: NORMAL
STONE ANALYSIS-IMP: NORMAL
STONE ANALYSIS-IMP: NORMAL
WT STONE: 15 MG

## 2024-01-18 ENCOUNTER — OFFICE VISIT (OUTPATIENT)
Dept: FAMILY MEDICINE CLINIC | Facility: CLINIC | Age: 79
End: 2024-01-18
Payer: MEDICARE

## 2024-01-18 VITALS
OXYGEN SATURATION: 97 % | WEIGHT: 190 LBS | SYSTOLIC BLOOD PRESSURE: 180 MMHG | TEMPERATURE: 97.4 F | DIASTOLIC BLOOD PRESSURE: 100 MMHG | BODY MASS INDEX: 24.38 KG/M2 | HEIGHT: 74 IN | HEART RATE: 55 BPM

## 2024-01-18 DIAGNOSIS — I10 PRIMARY HYPERTENSION: Chronic | ICD-10-CM

## 2024-01-18 DIAGNOSIS — F41.9 ANXIETY: ICD-10-CM

## 2024-01-18 DIAGNOSIS — E78.2 MIXED HYPERLIPIDEMIA: ICD-10-CM

## 2024-01-18 DIAGNOSIS — Z00.00 MEDICARE ANNUAL WELLNESS VISIT, SUBSEQUENT: Primary | ICD-10-CM

## 2024-01-18 DIAGNOSIS — D69.6 THROMBOCYTOPENIA (HCC): ICD-10-CM

## 2024-01-18 PROCEDURE — G0439 PPPS, SUBSEQ VISIT: HCPCS | Performed by: FAMILY MEDICINE

## 2024-01-18 RX ORDER — ALPRAZOLAM 0.25 MG/1
0.25 TABLET ORAL AS NEEDED
Qty: 10 TABLET | Refills: 0 | Status: SHIPPED | OUTPATIENT
Start: 2024-01-18

## 2024-01-18 RX ORDER — VALSARTAN 160 MG/1
160 TABLET ORAL DAILY
Qty: 90 TABLET | Refills: 3 | Status: SHIPPED | OUTPATIENT
Start: 2024-01-18

## 2024-01-18 RX ORDER — SIMVASTATIN 20 MG
20 TABLET ORAL
Qty: 90 TABLET | Refills: 1 | Status: SHIPPED | OUTPATIENT
Start: 2024-01-18

## 2024-01-18 NOTE — PROGRESS NOTES
Assessment and Plan:     Problem List Items Addressed This Visit       Anxiety    Hyperlipidemia        Preventive health issues were discussed with patient, and age appropriate screening tests were ordered as noted in patient's After Visit Summary.  Personalized health advice and appropriate referrals for health education or preventive services given if needed, as noted in patient's After Visit Summary.     History of Present Illness:     Patient presents for a Medicare Wellness Visit    Here for adult wellness visit.  Has had elevated blood pressure readings recently       Patient Care Team:  Floyd Ernandez DO as PCP - General  DO Jacob Bennett MD     Review of Systems:     Review of Systems   Constitutional:  Negative for activity change, appetite change, chills, diaphoresis, fatigue and unexpected weight change.   HENT:  Negative for congestion, ear discharge, ear pain, hearing loss, nosebleeds and rhinorrhea.    Eyes:  Negative for pain, redness, itching and visual disturbance.   Respiratory:  Negative for cough, choking, chest tightness and shortness of breath.    Cardiovascular:  Negative for chest pain and leg swelling.   Gastrointestinal:  Negative for abdominal pain, blood in stool, constipation, diarrhea and nausea.   Endocrine: Negative for cold intolerance, polydipsia and polyphagia.   Genitourinary:  Negative for dysuria, frequency, hematuria and urgency.   Musculoskeletal:  Negative for arthralgias, back pain, gait problem, joint swelling, neck pain and neck stiffness.   Skin:  Negative for color change and rash.   Allergic/Immunologic: Negative for environmental allergies and food allergies.   Neurological:  Negative for dizziness, tremors, seizures, speech difficulty, numbness and headaches.   Hematological:  Negative for adenopathy. Does not bruise/bleed easily.   Psychiatric/Behavioral:  Negative for behavioral problems, dysphoric mood, hallucinations and self-injury.          Problem List:     Patient Active Problem List   Diagnosis    Anxiety    Benign prostatic hyperplasia with elevated prostate specific antigen (PSA)    CAD in native artery    Esophageal reflux    Hyperlipidemia    Hypertension    Osteopenia    Vitamin D deficiency    Bladder calculi    Elevated PSA    Encounter for geriatric assessment    Thrombocytopenia (HCC)    Hematuria    Abnormal finding on CT scan    Hyponatremia    Pain emptying bladder    Stricture of bulbous urethra in male      Past Medical and Surgical History:     Past Medical History:   Diagnosis Date    Anxiety     Bladder stones     BPH (benign prostatic hyperplasia)     Coronary artery disease     Diverticulosis     Hyperlipidemia     Hypertension     Myocardial infarction (HCC)     2006    Pink eye      Past Surgical History:   Procedure Laterality Date    CORONARY ANGIOPLASTY WITH STENT PLACEMENT      CYSTOSCOPY  01/23/2014    Diagnostic    JOINT REPLACEMENT Right     KNEE ARTHROSCOPY      ID CYSTOURETHROSCOPY W/INTERNAL URETHROTOMY N/A 9/5/2023    Procedure: Urethral stricture balloon dilation under fluoroscopic guidance;  Surgeon: Jai Infante MD;  Location: [facility];  Service: Urology    PROSTATE BIOPSY  01/23/2014    PROSTATECTOMY N/A 05/22/2023    Procedure: ROBOTIC ASSISTED SUB-TOTAL SUPRAPUBIC PROSTATECTOMY;  Surgeon: Jai Infante MD;  Location: [facility];  Service: Urology    REPLACEMENT TOTAL KNEE      REPLACEMENT TOTAL KNEE        Family History:     Family History   Problem Relation Age of Onset    Other Father         Cardiac Disorder      Social History:     Social History     Socioeconomic History    Marital status: /Civil Union     Spouse name: None    Number of children: None    Years of education: None    Highest education level: None   Occupational History    None   Tobacco Use    Smoking status: Never    Smokeless tobacco: Never   Vaping Use    Vaping status: Never Used   Substance and Sexual Activity     Alcohol use: Yes     Alcohol/week: 1.0 standard drink of alcohol     Types: 1 Standard drinks or equivalent per week     Comment: social    Drug use: No    Sexual activity: Not Currently     Partners: Female   Other Topics Concern    None   Social History Narrative    None     Social Determinants of Health     Financial Resource Strain: Low Risk  (1/12/2023)    Overall Financial Resource Strain (CARDIA)     Difficulty of Paying Living Expenses: Not hard at all   Food Insecurity: Not on file   Transportation Needs: No Transportation Needs (1/12/2023)    PRAPARE - Transportation     Lack of Transportation (Medical): No     Lack of Transportation (Non-Medical): No   Physical Activity: Not on file   Stress: Not on file   Social Connections: Not on file   Intimate Partner Violence: Not on file   Housing Stability: Not on file      Medications and Allergies:     Current Outpatient Medications   Medication Sig Dispense Refill    ALPRAZolam (XANAX) 0.25 mg tablet Take 1 tablet (0.25 mg total) by mouth 3 (three) times a day (Patient taking differently: Take 0.25 mg by mouth if needed for anxiety) 30 tablet 0    aspirin 81 MG tablet Take by mouth every other day      simvastatin (ZOCOR) 20 mg tablet Take 1 tablet (20 mg total) by mouth daily at bedtime 90 tablet 1    terazosin (HYTRIN) 5 mg capsule TAKE 1 CAPSULE BY MOUTH EVERY DAY 90 capsule 1    docusate sodium (COLACE) 100 mg capsule Take 1 capsule (100 mg total) by mouth 3 (three) times a day as needed for constipation for up to 7 days (Patient not taking: Reported on 1/2/2024) 21 capsule 0     No current facility-administered medications for this visit.     No Known Allergies   Immunizations:     Immunization History   Administered Date(s) Administered    COVID-19 MODERNA VACC 0.5 ML IM 02/12/2021, 11/03/2021, 05/02/2022    COVID-19 Moderna Vac BIVALENT 12 Yr+ IM 0.5 ML 10/04/2022    COVID-19 Moderna mRNA Vaccine 12 Yr+ 50 mcg/0.5 mL (Spikevax) 09/23/2023    Influenza,  Seasonal Vaccine, Quadrivalent, Adjuvanted, .5e 10/24/2022    Influenza, high dose seasonal 0.7 mL 11/06/2019, 09/29/2020, 12/23/2021    Tdap 03/06/2007, 02/06/2020    Tuberculin Skin Test-PPD Intradermal 08/06/2014, 08/09/2017      Health Maintenance:         Topic Date Due    Hepatitis C Screening  Completed    Colorectal Cancer Screening  Discontinued         Topic Date Due    Pneumococcal Vaccine: 65+ Years (1 - PCV) Never done    Influenza Vaccine (1) 09/01/2023    COVID-19 Vaccine (6 - 2023-24 season) 11/18/2023      Medicare Screening Tests and Risk Assessments:     Juan is here for his Subsequent Wellness visit. Last Medicare Wellness visit information reviewed, patient interviewed and updates made to the record today.      Health Risk Assessment:   Patient rates overall health as good. Patient feels that their physical health rating is slightly better. Patient is satisfied with their life. Eyesight was rated as same. Hearing was rated as same. Patient feels that their emotional and mental health rating is same. Patients states they are never, rarely angry. Patient states they are never, rarely unusually tired/fatigued. Pain experienced in the last 7 days has been some. Patient's pain rating has been 5/10. Patient states that he has experienced no weight loss or gain in last 6 months. Knee pain     Depression Screening:   PHQ-2 Score: 0      Fall Risk Screening:   In the past year, patient has experienced: no history of falling in past year      Home Safety:  Patient does not have trouble with stairs inside or outside of their home. Patient has working smoke alarms and has working carbon monoxide detector. Home safety hazards include: none.     Nutrition:   Current diet is Regular, Low Cholesterol and Low Carb. healthy diet, nutrition is really good  Patient only drinks on occasion    Medications:   Patient is currently taking over-the-counter supplements. OTC medications include: see medication list.  Patient is able to manage medications.     Activities of Daily Living (ADLs)/Instrumental Activities of Daily Living (IADLs):   Walk and transfer into and out of bed and chair?: Yes  Dress and groom yourself?: Yes    Bathe or shower yourself?: Yes    Feed yourself? Yes  Do your laundry/housekeeping?: Yes  Manage your money, pay your bills and track your expenses?: Yes  Make your own meals?: Yes    Do your own shopping?: Yes    Previous Hospitalizations:   Any hospitalizations or ED visits within the last 12 months?: Yes    How many hospitalizations have you had in the last year?: 3-4    Hospitalization Comments: Prostectomy, balloon insertion in urethra,  and other ER visits     Advance Care Planning:   Living will: No    Durable POA for healthcare: No    Advanced directive: No    ACP document given: Yes      Cognitive Screening:   Provider or family/friend/caregiver concerned regarding cognition?: No    PREVENTIVE SCREENINGS      Cardiovascular Screening:    General: Screening Not Indicated and History Lipid Disorder      Diabetes Screening:     General: Screening Current      Prostate Cancer Screening:    General: Screening Not Indicated      Lung Cancer Screening:     General: Screening Not Indicated      Hepatitis C Screening:    General: Screening Current    Screening, Brief Intervention, and Referral to Treatment (SBIRT)    Screening  Typical number of drinks in a day: 1  Typical number of drinks in a week: 1  Interpretation: Low risk drinking behavior.    AUDIT-C Screenin) How often did you have a drink containing alcohol in the past year? monthly or less  2) How many drinks did you have on a typical day when you were drinking in the past year? 1 to 2  3) How often did you have 6 or more drinks on one occasion in the past year? never    AUDIT-C Score: 1  Interpretation: Score 0-3 (male): Negative screen for alcohol misuse    Single Item Drug Screening:  How often have you used an illegal drug (including  "marijuana) or a prescription medication for non-medical reasons in the past year? never    Single Item Drug Screen Score: 0  Interpretation: Negative screen for possible drug use disorder    Other Counseling Topics:   Regular weightbearing exercise.     No results found.     Physical Exam:     Ht 6' 2\" (1.88 m)   Wt 86.2 kg (190 lb)   BMI 24.39 kg/m²     Physical Exam  Constitutional:       Appearance: He is well-developed.   HENT:      Head: Normocephalic.      Right Ear: External ear normal.      Left Ear: External ear normal.      Nose: Nose normal.   Eyes:      Pupils: Pupils are equal, round, and reactive to light.   Cardiovascular:      Rate and Rhythm: Normal rate and regular rhythm.      Heart sounds: Normal heart sounds.   Pulmonary:      Effort: Pulmonary effort is normal.      Breath sounds: Normal breath sounds.   Abdominal:      General: Bowel sounds are normal.      Palpations: Abdomen is soft.      Tenderness: There is no abdominal tenderness.   Genitourinary:     Prostate: Normal.   Musculoskeletal:         General: Normal range of motion.      Cervical back: Normal range of motion and neck supple.   Skin:     General: Skin is warm and dry.   Neurological:      Mental Status: He is alert and oriented to person, place, and time.      Deep Tendon Reflexes: Reflexes normal.   Psychiatric:         Behavior: Behavior normal.         Thought Content: Thought content normal.         Judgment: Judgment normal.        Begin valsartan 160 mg daily.  Continue other meds.  Advance care planning documents provided  Floyd Ernandez DO  "

## 2024-01-18 NOTE — PATIENT INSTRUCTIONS
Medicare Preventive Visit Patient Instructions  Thank you for completing your Welcome to Medicare Visit or Medicare Annual Wellness Visit today. Your next wellness visit will be due in one year (1/18/2025).  The screening/preventive services that you may require over the next 5-10 years are detailed below. Some tests may not apply to you based off risk factors and/or age. Screening tests ordered at today's visit but not completed yet may show as past due. Also, please note that scanned in results may not display below.  Preventive Screenings:  Service Recommendations Previous Testing/Comments   Colorectal Cancer Screening  Colonoscopy    Fecal Occult Blood Test (FOBT)/Fecal Immunochemical Test (FIT)  Fecal DNA/Cologuard Test  Flexible Sigmoidoscopy Age: 45-75 years old   Colonoscopy: every 10 years (May be performed more frequently if at higher risk)  OR  FOBT/FIT: every 1 year  OR  Cologuard: every 3 years  OR  Sigmoidoscopy: every 5 years  Screening may be recommended earlier than age 45 if at higher risk for colorectal cancer. Also, an individualized decision between you and your healthcare provider will decide whether screening between the ages of 76-85 would be appropriate. Colonoscopy: 11/17/2014  FOBT/FIT: Not on file  Cologuard: Not on file  Sigmoidoscopy: Not on file          Prostate Cancer Screening Individualized decision between patient and health care provider in men between ages of 55-69   Medicare will cover every 12 months beginning on the day after your 50th birthday PSA: 7.4 ng/mL     Screening Not Indicated     Hepatitis C Screening Once for adults born between 1945 and 1965  More frequently in patients at high risk for Hepatitis C Hep C Antibody: 12/07/2018    Screening Current   Diabetes Screening 1-2 times per year if you're at risk for diabetes or have pre-diabetes Fasting glucose: 125 mg/dL (5/23/2023)  A1C: No results in last 5 years (No results in last 5 years)  Screening Current    Cholesterol Screening Once every 5 years if you don't have a lipid disorder. May order more often based on risk factors. Lipid panel: 01/18/2023  Screening Not Indicated  History Lipid Disorder      Other Preventive Screenings Covered by Medicare:  Abdominal Aortic Aneurysm (AAA) Screening: covered once if your at risk. You're considered to be at risk if you have a family history of AAA or a male between the age of 65-75 who smoking at least 100 cigarettes in your lifetime.  Lung Cancer Screening: covers low dose CT scan once per year if you meet all of the following conditions: (1) Age 55-77; (2) No signs or symptoms of lung cancer; (3) Current smoker or have quit smoking within the last 15 years; (4) You have a tobacco smoking history of at least 20 pack years (packs per day x number of years you smoked); (5) You get a written order from a healthcare provider.  Glaucoma Screening: covered annually if you're considered high risk: (1) You have diabetes OR (2) Family history of glaucoma OR (3)  aged 50 and older OR (4)  American aged 65 and older  Osteoporosis Screening: covered every 2 years if you meet one of the following conditions: (1) Have a vertebral abnormality; (2) On glucocorticoid therapy for more than 3 months; (3) Have primary hyperparathyroidism; (4) On osteoporosis medications and need to assess response to drug therapy.  HIV Screening: covered annually if you're between the age of 15-65. Also covered annually if you are younger than 15 and older than 65 with risk factors for HIV infection. For pregnant patients, it is covered up to 3 times per pregnancy.    Immunizations:  Immunization Recommendations   Influenza Vaccine Annual influenza vaccination during flu season is recommended for all persons aged >= 6 months who do not have contraindications   Pneumococcal Vaccine   * Pneumococcal conjugate vaccine = PCV13 (Prevnar 13), PCV15 (Vaxneuvance), PCV20 (Prevnar 20)  *  Pneumococcal polysaccharide vaccine = PPSV23 (Pneumovax) Adults 19-65 yo with certain risk factors or if 65+ yo  If never received any pneumonia vaccine: recommend Prevnar 20 (PCV20)  Give PCV20 if previously received 1 dose of PCV13 or PPSV23   Hepatitis B Vaccine 3 dose series if at intermediate or high risk (ex: diabetes, end stage renal disease, liver disease)   Respiratory syncytial virus (RSV) Vaccine - COVERED BY MEDICARE PART D  * RSVPreF3 (Arexvy) CDC recommends that adults 60 years of age and older may receive a single dose of RSV vaccine using shared clinical decision-making (SCDM)   Tetanus (Td) Vaccine - COST NOT COVERED BY MEDICARE PART B Following completion of primary series, a booster dose should be given every 10 years to maintain immunity against tetanus. Td may also be given as tetanus wound prophylaxis.   Tdap Vaccine - COST NOT COVERED BY MEDICARE PART B Recommended at least once for all adults. For pregnant patients, recommended with each pregnancy.   Shingles Vaccine (Shingrix) - COST NOT COVERED BY MEDICARE PART B  2 shot series recommended in those 19 years and older who have or will have weakened immune systems or those 50 years and older     Health Maintenance Due:      Topic Date Due   • Hepatitis C Screening  Completed   • Colorectal Cancer Screening  Discontinued     Immunizations Due:      Topic Date Due   • Pneumococcal Vaccine: 65+ Years (1 - PCV) Never done   • Influenza Vaccine (1) 09/01/2023   • COVID-19 Vaccine (6 - 2023-24 season) 11/18/2023     Advance Directives   What are advance directives?  Advance directives are legal documents that state your wishes and plans for medical care. These plans are made ahead of time in case you lose your ability to make decisions for yourself. Advance directives can apply to any medical decision, such as the treatments you want, and if you want to donate organs.   What are the types of advance directives?  There are many types of advance  directives, and each state has rules about how to use them. You may choose a combination of any of the following:  Living will:  This is a written record of the treatment you want. You can also choose which treatments you do not want, which to limit, and which to stop at a certain time. This includes surgery, medicine, IV fluid, and tube feedings.   Durable power of  for healthcare (DPAHC):  This is a written record that states who you want to make healthcare choices for you when you are unable to make them for yourself. This person, called a proxy, is usually a family member or a friend. You may choose more than 1 proxy.  Do not resuscitate (DNR) order:  A DNR order is used in case your heart stops beating or you stop breathing. It is a request not to have certain forms of treatment, such as CPR. A DNR order may be included in other types of advance directives.  Medical directive:  This covers the care that you want if you are in a coma, near death, or unable to make decisions for yourself. You can list the treatments you want for each condition. Treatment may include pain medicine, surgery, blood transfusions, dialysis, IV or tube feedings, and a ventilator (breathing machine).  Values history:  This document has questions about your views, beliefs, and how you feel and think about life. This information can help others choose the care that you would choose.  Why are advance directives important?  An advance directive helps you control your care. Although spoken wishes may be used, it is better to have your wishes written down. Spoken wishes can be misunderstood, or not followed. Treatments may be given even if you do not want them. An advance directive may make it easier for your family to make difficult choices about your care.       © Copyright [employer] 2018 Information is for End User's use only and may not be sold, redistributed or otherwise used for commercial purposes. All illustrations and  images included in CareNotes® are the copyrighted property of [employer] or [email or web address]

## 2024-03-01 DIAGNOSIS — N40.1 BPH WITH URINARY OBSTRUCTION: ICD-10-CM

## 2024-03-01 DIAGNOSIS — N13.8 BPH WITH URINARY OBSTRUCTION: ICD-10-CM

## 2024-03-01 RX ORDER — TERAZOSIN 5 MG/1
5 CAPSULE ORAL DAILY
Qty: 90 CAPSULE | Refills: 1 | Status: SHIPPED | OUTPATIENT
Start: 2024-03-01

## 2024-03-10 DIAGNOSIS — I10 PRIMARY HYPERTENSION: Chronic | ICD-10-CM

## 2024-03-11 RX ORDER — VALSARTAN 160 MG/1
160 TABLET ORAL DAILY
Qty: 90 TABLET | Refills: 4 | Status: SHIPPED | OUTPATIENT
Start: 2024-03-11

## 2024-04-10 ENCOUNTER — TELEPHONE (OUTPATIENT)
Dept: FAMILY MEDICINE CLINIC | Facility: CLINIC | Age: 79
End: 2024-04-10

## 2024-04-10 NOTE — TELEPHONE ENCOUNTER
Patient calling because he was informed by his insurance that the Irbesartan 150mg would be a better for him than the Valsartan 160mg. Patient would like to know if the doctor would agree with this and if it is could it be sent to [facility].    Please advise

## 2024-04-15 DIAGNOSIS — Z00.00 MEDICARE ANNUAL WELLNESS VISIT, SUBSEQUENT: ICD-10-CM

## 2024-04-15 DIAGNOSIS — R97.20 ELEVATED PSA: ICD-10-CM

## 2024-04-15 DIAGNOSIS — I10 PRIMARY HYPERTENSION: Primary | ICD-10-CM

## 2024-04-15 DIAGNOSIS — E78.2 MIXED HYPERLIPIDEMIA: ICD-10-CM

## 2024-04-15 DIAGNOSIS — I25.10 CAD IN NATIVE ARTERY: ICD-10-CM

## 2024-04-24 ENCOUNTER — TELEPHONE (OUTPATIENT)
Dept: FAMILY MEDICINE CLINIC | Facility: CLINIC | Age: 79
End: 2024-04-24

## 2024-04-24 ENCOUNTER — APPOINTMENT (OUTPATIENT)
Dept: LAB | Facility: CLINIC | Age: 79
End: 2024-04-24
Payer: MEDICARE

## 2024-04-24 DIAGNOSIS — R97.20 ELEVATED PSA: ICD-10-CM

## 2024-04-24 DIAGNOSIS — E78.2 MIXED HYPERLIPIDEMIA: ICD-10-CM

## 2024-04-24 DIAGNOSIS — I25.10 CAD IN NATIVE ARTERY: ICD-10-CM

## 2024-04-24 DIAGNOSIS — I10 PRIMARY HYPERTENSION: ICD-10-CM

## 2024-04-24 DIAGNOSIS — Z00.00 MEDICARE ANNUAL WELLNESS VISIT, SUBSEQUENT: ICD-10-CM

## 2024-04-24 LAB
ALBUMIN SERPL BCP-MCNC: 4 G/DL (ref 3.5–5)
ALP SERPL-CCNC: 49 U/L (ref 34–104)
ALT SERPL W P-5'-P-CCNC: 10 U/L (ref 7–52)
ANION GAP SERPL CALCULATED.3IONS-SCNC: 6 MMOL/L (ref 4–13)
AST SERPL W P-5'-P-CCNC: 14 U/L (ref 13–39)
BASOPHILS # BLD AUTO: 0.05 THOUSANDS/ÂΜL (ref 0–0.1)
BASOPHILS NFR BLD AUTO: 1 % (ref 0–1)
BILIRUB SERPL-MCNC: 0.75 MG/DL (ref 0.2–1)
BUN SERPL-MCNC: 18 MG/DL (ref 5–25)
CALCIUM SERPL-MCNC: 9.1 MG/DL (ref 8.4–10.2)
CHLORIDE SERPL-SCNC: 100 MMOL/L (ref 96–108)
CHOLEST SERPL-MCNC: 124 MG/DL
CO2 SERPL-SCNC: 30 MMOL/L (ref 21–32)
CREAT SERPL-MCNC: 0.8 MG/DL (ref 0.6–1.3)
EOSINOPHIL # BLD AUTO: 0.14 THOUSAND/ÂΜL (ref 0–0.61)
EOSINOPHIL NFR BLD AUTO: 2 % (ref 0–6)
ERYTHROCYTE [DISTWIDTH] IN BLOOD BY AUTOMATED COUNT: 13 % (ref 11.6–15.1)
GFR SERPL CREATININE-BSD FRML MDRD: 86 ML/MIN/1.73SQ M
GLUCOSE P FAST SERPL-MCNC: 82 MG/DL (ref 65–99)
HCT VFR BLD AUTO: 41.2 % (ref 36.5–49.3)
HDLC SERPL-MCNC: 46 MG/DL
HGB BLD-MCNC: 13.6 G/DL (ref 12–17)
IMM GRANULOCYTES # BLD AUTO: 0.05 THOUSAND/UL (ref 0–0.2)
IMM GRANULOCYTES NFR BLD AUTO: 1 % (ref 0–2)
LDLC SERPL CALC-MCNC: 62 MG/DL (ref 0–100)
LYMPHOCYTES # BLD AUTO: 2.2 THOUSANDS/ÂΜL (ref 0.6–4.47)
LYMPHOCYTES NFR BLD AUTO: 35 % (ref 14–44)
MCH RBC QN AUTO: 30 PG (ref 26.8–34.3)
MCHC RBC AUTO-ENTMCNC: 33 G/DL (ref 31.4–37.4)
MCV RBC AUTO: 91 FL (ref 82–98)
MONOCYTES # BLD AUTO: 0.69 THOUSAND/ÂΜL (ref 0.17–1.22)
MONOCYTES NFR BLD AUTO: 11 % (ref 4–12)
NEUTROPHILS # BLD AUTO: 3.15 THOUSANDS/ÂΜL (ref 1.85–7.62)
NEUTS SEG NFR BLD AUTO: 50 % (ref 43–75)
NRBC BLD AUTO-RTO: 0 /100 WBCS
PLATELET # BLD AUTO: 172 THOUSANDS/UL (ref 149–390)
PMV BLD AUTO: 11.2 FL (ref 8.9–12.7)
POTASSIUM SERPL-SCNC: 4 MMOL/L (ref 3.5–5.3)
PROT SERPL-MCNC: 7.2 G/DL (ref 6.4–8.4)
RBC # BLD AUTO: 4.53 MILLION/UL (ref 3.88–5.62)
SODIUM SERPL-SCNC: 136 MMOL/L (ref 135–147)
TRIGL SERPL-MCNC: 81 MG/DL
TSH SERPL DL<=0.05 MIU/L-ACNC: 2.34 UIU/ML (ref 0.45–4.5)
WBC # BLD AUTO: 6.28 THOUSAND/UL (ref 4.31–10.16)

## 2024-04-24 PROCEDURE — 80053 COMPREHEN METABOLIC PANEL: CPT

## 2024-04-24 PROCEDURE — 84443 ASSAY THYROID STIM HORMONE: CPT

## 2024-04-24 PROCEDURE — 80061 LIPID PANEL: CPT

## 2024-04-24 PROCEDURE — 85025 COMPLETE CBC W/AUTO DIFF WBC: CPT

## 2024-04-24 PROCEDURE — 84153 ASSAY OF PSA TOTAL: CPT

## 2024-04-24 PROCEDURE — 84154 ASSAY OF PSA FREE: CPT

## 2024-04-24 PROCEDURE — 36415 COLL VENOUS BLD VENIPUNCTURE: CPT

## 2024-04-24 NOTE — TELEPHONE ENCOUNTER
Caller: Tamara Singh    Relationship: Self    Best call back number: [phone]    Medication needed:   Requested Prescriptions     Pending Prescriptions Disp Refills   • HYDROcodone-acetaminophen (NORCO) 5-325 MG per tablet 120 tablet 0     Sig: Take 1 tablet by mouth Every 4 (Four) Hours As Needed for Moderate Pain  or Severe Pain .       When do you need the refill by:     What additional details did the patient provide when requesting the medication:     Does the patient have less than a 3 day supply:  [x] Yes  [] No    What is the patient's preferred pharmacy:    [facility]  [address] [place] [address]  [phone]             I call pt and he is advised with the results

## 2024-04-24 NOTE — TELEPHONE ENCOUNTER
----- Message from Floyd Ernandez DO sent at 4/24/2024 11:57 AM EDT -----  CBC and platelets are normal.  Others are still pending

## 2024-04-25 ENCOUNTER — TELEPHONE (OUTPATIENT)
Dept: FAMILY MEDICINE CLINIC | Facility: CLINIC | Age: 79
End: 2024-04-25

## 2024-04-25 LAB
PSA FREE MFR SERPL: 15.3 %
PSA FREE SERPL-MCNC: 0.23 NG/ML
PSA SERPL-MCNC: 1.5 NG/ML (ref 0–4)

## 2024-04-25 NOTE — TELEPHONE ENCOUNTER
----- Message from Floyd Ernandez DO sent at 4/25/2024 11:34 AM EDT -----  Labs are normal.  Continue the current Rx

## 2024-05-02 ENCOUNTER — OFFICE VISIT (OUTPATIENT)
Dept: AUDIOLOGY | Age: 79
End: 2024-05-02
Payer: MEDICARE

## 2024-05-02 DIAGNOSIS — H90.3 SENSORY HEARING LOSS, BILATERAL: Primary | ICD-10-CM

## 2024-05-02 PROCEDURE — 92567 TYMPANOMETRY: CPT | Performed by: AUDIOLOGIST

## 2024-05-02 PROCEDURE — 92557 COMPREHENSIVE HEARING TEST: CPT | Performed by: AUDIOLOGIST

## 2024-05-02 NOTE — PROGRESS NOTES
Diagnostic Hearing Evaluation    Name:  Juan Marcelo  :  1945  Age:  79 y.o.   MRN:  [id number]  Date of Evaluation: 24     HISTORY:     Reason for visit: Difficulty Understanding    Juan Marcelo is being seen today at the request of Dr. Ernandez for an initial  evaluation of hearing. Patient reports difficulty understanding, especially when in noise.  Patient denies otalgia, otorrhea, dizziness, fullness, tinnitus, family history of hearing loss, and notes a positive history of noise exposure.     EVALUATION:    Otoscopic Evaluation:   Right Ear: Unremarkable, canal clear   Left Ear: Unremarkable, canal clear    Tympanometry:   Right Ear: Type A; normal middle ear pressure and static compliance    Left Ear: Type A; normal middle ear pressure and static compliance     Speech Audiometry:  Speech Reception (SRT)    Right Ear: 35 dB HL    Left Ear: 35 dB HL    Word Recognition Scores (WRS):  Right Ear: excellent (100 % correct)     Left Ear: excellent (100 % correct)    Stimuli: [id number]    Pure Tone Audiometry:  Conventional pure tone audiometry from 250 - 8000 Hz  was obtained with good reliability and revealed the following:     Right Ear: Mild sloping to severe sensorineural hearing loss (SNHL)    Left Ear: Mild sloping to severe sensorineural hearing loss (SNHL)     *see attached audiogram    IMPRESSIONS:   Mild sloping to severe, SNHL bilaterally.    RECOMMENDATIONS:  Return to [facility]. for F/U, Hearing Aid Evaluation, and Copy to Patient/Caregiver    PATIENT EDUCATION:   The results of today's results and recommendations were reviewed with the patient and his hearing thresholds were explained at length. Treatment options, including amplification and communication strategies, were discussed as appropriate. The patient voiced understanding of his test results. Questions were addressed and the patient was encouraged to contact our department should concerns arise.      Shilpi Beaulieu., CCC-A  Clinical  Audiologist  [facility]  [address] [id number]

## 2024-05-06 ENCOUNTER — OFFICE VISIT (OUTPATIENT)
Dept: AUDIOLOGY | Age: 79
End: 2024-05-06

## 2024-05-06 DIAGNOSIS — H90.3 SENSORY HEARING LOSS, BILATERAL: Primary | ICD-10-CM

## 2024-05-06 NOTE — PROGRESS NOTES
Hearing Aid Evaluation  Name:  Juan Marcelo  :  1945  Age:  79 y.o.  MRN:  [id number]  Date of Evaluation: 24     HISTORY:    Juan Marcelo was seen today for a hearing aid evaluation following his audiometric testing performed on 24. Juan was unaccompanied to today's visit. Juan was referred by Dr. Ernandez.     RESULTS REVIEW:    The audiometric findings were reviewed with the patient . All of the patient's questions regarding his hearing status were addressed, and the importance of realistic expectations of hearing loss and amplification were discussed.      DEVICE REVIEW & RECOMMENDATION:     Strengths and limitations of amplification, including various hearing aid styles, technology, options, and accessories were discussed at length with patient. Hearing aids are assistive devices and are not designed to restore normal hearing. The patient was counseled on the importance of self-advocacy, motivation, as well as effective communication strategies that can be used to optimize hearing aid success. Based on the degree of his hearing loss, preferences, and lifestyle needs, the following hearing recommendations were made:    The first hearing aid recommendation is Oticon Real 2.  The second hearing aid recommendation is Oticon Real 3.    [facility] office policies regarding our hearing aid program were reviewed, including the 45 day trial period, non-refundable return fees, as well as the  warranties and service plan. Hearing aid cost, and payment, as well as insurance benefit (if applicable) were discussed with the patient.     *See attached quote sheet    DEVICE SELECTION:    At this time, patient wishes to defer the purchase of hearing aids.      Shilpi Beaulieu., CCC-A  Clinical Audiologist  [facility]  [address]  [place] [address]

## 2024-05-07 ENCOUNTER — TELEPHONE (OUTPATIENT)
Age: 79
End: 2024-05-07

## 2024-05-07 NOTE — TELEPHONE ENCOUNTER
Received call from patient asking to manish a new patient appt for redness on his face.    I offered the next available in 2025.    Patient stated that was to far away.    I recommended him to see his PC,urgent care, ER or call his insurance company to request a list of Dermatologist that accept his insurance to see if he could possibly get in sooner then with us.    Patient verbalized understanding

## 2024-05-08 DIAGNOSIS — I10 PRIMARY HYPERTENSION: Chronic | ICD-10-CM

## 2024-05-08 RX ORDER — VALSARTAN 160 MG/1
160 TABLET ORAL DAILY
Qty: 90 TABLET | Refills: 1 | Status: SHIPPED | OUTPATIENT
Start: 2024-05-08

## 2024-05-29 ENCOUNTER — HOSPITAL ENCOUNTER (OUTPATIENT)
Dept: CT IMAGING | Facility: HOSPITAL | Age: 79
Discharge: HOME | End: 2024-05-29
Payer: MEDICARE

## 2024-05-29 DIAGNOSIS — R91.1 PULMONARY NODULE: ICD-10-CM

## 2024-05-29 PROCEDURE — G1004 CDSM NDSC: HCPCS

## 2024-05-29 PROCEDURE — 71250 CT THORAX DX C-: CPT

## 2024-06-17 ENCOUNTER — TELEPHONE (OUTPATIENT)
Age: 79
End: 2024-06-17

## 2024-06-17 NOTE — TELEPHONE ENCOUNTER
Received a call from Eleonora with [facility] cardiology. Patient is there in office for a physical and they need his most recent lipid panel and CMP sent over. Confirmed fax number and sent over to them.

## 2024-07-11 DIAGNOSIS — E78.2 MIXED HYPERLIPIDEMIA: ICD-10-CM

## 2024-07-11 RX ORDER — SIMVASTATIN 20 MG
20 TABLET ORAL
Qty: 100 TABLET | Refills: 1 | Status: SHIPPED | OUTPATIENT
Start: 2024-07-11

## 2024-07-17 ENCOUNTER — OFFICE VISIT (OUTPATIENT)
Dept: FAMILY MEDICINE CLINIC | Facility: CLINIC | Age: 79
End: 2024-07-17
Payer: MEDICARE

## 2024-07-17 VITALS
BODY MASS INDEX: 23.1 KG/M2 | HEIGHT: 74 IN | WEIGHT: 180 LBS | SYSTOLIC BLOOD PRESSURE: 135 MMHG | HEART RATE: 54 BPM | OXYGEN SATURATION: 98 % | TEMPERATURE: 98.2 F | DIASTOLIC BLOOD PRESSURE: 80 MMHG

## 2024-07-17 DIAGNOSIS — I10 ESSENTIAL (PRIMARY) HYPERTENSION: ICD-10-CM

## 2024-07-17 DIAGNOSIS — H00.015 HORDEOLUM EXTERNUM OF LEFT LOWER EYELID: Primary | ICD-10-CM

## 2024-07-17 PROCEDURE — 99213 OFFICE O/P EST LOW 20 MIN: CPT | Performed by: FAMILY MEDICINE

## 2024-07-17 PROCEDURE — G2211 COMPLEX E/M VISIT ADD ON: HCPCS | Performed by: FAMILY MEDICINE

## 2024-07-17 RX ORDER — AMOXICILLIN 875 MG/1
875 TABLET, COATED ORAL 2 TIMES DAILY
Qty: 14 TABLET | Refills: 0 | Status: SHIPPED | OUTPATIENT
Start: 2024-07-17 | End: 2024-07-24

## 2024-07-17 RX ORDER — IRBESARTAN 75 MG/1
75 TABLET ORAL DAILY
COMMUNITY

## 2024-07-17 RX ORDER — ERYTHROMYCIN 5 MG/G
0.5 OINTMENT OPHTHALMIC EVERY 8 HOURS SCHEDULED
Qty: 3.5 G | Refills: 0 | Status: SHIPPED | OUTPATIENT
Start: 2024-07-17

## 2024-07-17 NOTE — PROGRESS NOTES
"Assessment/Plan:      1. Hordeolum externum of left lower eyelid  Assessment & Plan:  Amoxicillin 1 tab twice a day and   Ilotycin apply small amount of ointment 3 times a day for 3-5 days.   Orders:  -     amoxicillin (AMOXIL) 875 mg tablet; Take 1 tablet (875 mg total) by mouth 2 (two) times a day for 7 days  -     erythromycin (ILOTYCIN) ophthalmic ointment; Administer 0.5 inches into the left eye every 8 (eight) hours  2. Essential (primary) hypertension  Assessment & Plan:  Controlled on current medication        Subjective:  Chief Complaint   Patient presents with    Stye     On left eye. Appeared 4 [place] ago[place] puss afterwards. Warm compresses helped for the most part.         Patient ID: Juan Marcelo is a 79 y.o. male.    Started with stye in left eye   Had some problems with the lid for a few weeks.           Review of Systems   Constitutional: Negative.  Negative for fatigue and fever.   HENT: Negative.     Eyes:         Left lower lid stye   Respiratory: Negative.  Negative for cough.    Cardiovascular: Negative.    Gastrointestinal: Negative.    Endocrine: Negative.    Genitourinary: Negative.    Musculoskeletal: Negative.    Skin: Negative.    Allergic/Immunologic: Negative.    Neurological: Negative.    Psychiatric/Behavioral: Negative.           The following portions of the patient's history were reviewed and updated as appropriate: allergies, current medications, past family history, past medical history, past social history, past surgical history and problem list.    Objective:  Vitals:    07/17/24 1509 07/17/24 1531   BP:  135/80   Pulse: (!) 54    Temp: 98.2 °F (36.8 °C)    TempSrc: Tympanic    SpO2: 98%    Weight: 81.6 kg (180 lb)    Height: 6' 2\" (1.88 m)       Physical Exam  Vitals and nursing note reviewed.   Constitutional:       Appearance: He is well-developed.   HENT:      Head: Normocephalic and atraumatic.   Eyes:      Comments: Papule with erythema " over left lower lid.   Cardiovascular:      Rate and Rhythm: Normal rate and regular rhythm.      Heart sounds: Normal heart sounds.   Pulmonary:      Effort: Pulmonary effort is normal.      Breath sounds: Normal breath sounds.   Abdominal:      General: Bowel sounds are normal.      Palpations: Abdomen is soft.   Skin:     General: Skin is warm and dry.   Neurological:      Mental Status: He is alert and oriented to person, place, and time.   Psychiatric:         Behavior: Behavior normal.         Thought Content: Thought content normal.         Judgment: Judgment normal.

## 2024-07-17 NOTE — PATIENT INSTRUCTIONS
"Amoxicillin 1 tab twice a day  Eye ointment 3 times a day 5-7 days until clear     Patient Education     Stye   The Basics   Written by the doctors and editors at [facility]   What is a stye? -- A stye is a red and painful lump on the eyelid. It happens when a small gland on the edge of the eyelid gets infected or inflamed. Styes can form on the upper or lower eyelids. Most styes get better on their own after a few days to a week. The medical term for stye is \"hordeolum.\"  People sometimes get a stye confused with a different eye problem called a \"chalazion.\" A chalazion also causes a lump on the eyelid. But a stye is caused by an infection and is painful. A chalazion is not tender or painful, but it often lasts longer than a stye does.  What are the symptoms of a stye? -- People who have a stye have a painful lump on the edge of their eyelid (picture 1). The lump might look red, swollen, or similar to a pimple.  A stye usually develops over a few days. Styes can cause other symptoms, too, such as tearing and eyelid pain and swelling.  Is there a test for a stye? -- No. But your doctor or nurse should be able to tell if you have a stye by talking with you and doing an exam.  Is there anything I can do on my own? -- Yes:   Put a warm, wet compress on the stye. Wet a clean washcloth with warm water, and put it over your stye. When the washcloth cools, reheat it with warm water and put it back over the stye. Repeat these steps for about 15 minutes, and try to do this 4 times a day.   It might help to gently massage your eyelid.   Do not squeeze or try to pop your stye. This can make it worse.   Do not wear eye makeup or contact lenses until your stye is gone.  What treatments might my doctor use? -- If your stye doesn't get better or if it leads to other problems, your doctor might:   Prescribe a cream or ointment that goes in the eye and on the eyelid   Prescribe antibiotic medicines   Do a procedure to drain the " stye  Can styes be prevented? -- Yes. To lower your chances of getting a stye, you can:   Wash your hands often - It's especially important to wash your hands before you touch your eyes. Also, if you wear contact lenses, keep them clean and wash your hands before you put them in.   Be careful with your eye makeup - Wearing eye makeup can sometimes cause a stye. Remove your eye makeup each night, and throw away old makeup. Do not share eye makeup with other people.  When should I call the doctor? -- Call your doctor or nurse for advice if:   Your stye doesn't go away after using warm compresses for 1 to 2 weeks.   Your stye gets very big, bleeds, or affects your vision.   Your whole eye is red, or your whole eyelid is red or swollen.   The redness or swelling spreads to your cheek or other parts of your face.  All topics are updated as new evidence becomes available and our peer review process is complete.  This topic retrieved from [employer] on: Feb 26, 2024.  Topic 58321 Version 17.0  Release: 32.2.4 - C32.56  © 2024 UpToDate, Inc. and/or its affiliates. All rights reserved.  picture 1: Stye     This person has a stye on the lower eyelid.  Graphic 53841 Version 2.0  Consumer Information Use and Disclaimer   Disclaimer: This generalized information is a limited summary of diagnosis, treatment, and/or medication information. It is not meant to be comprehensive and should be used as a tool to help the user understand and/or assess potential diagnostic and treatment options. It does NOT include all information about conditions, treatments, medications, side effects, or risks that may apply to a specific patient. It is not intended to be medical advice or a substitute for the medical advice, diagnosis, or treatment of a health care provider based on the health care provider's examination and assessment of a patient's specific and unique circumstances. Patients must speak with a health care provider for complete  information about their health, medical questions, and treatment options, including any risks or benefits regarding use of medications. This information does not endorse any treatments or medications as safe, effective, or approved for treating a specific patient. UpToDate, Inc. and its affiliates disclaim any warranty or liability relating to this information or the use thereof.The use of this information is governed by the Terms of Use, available at https://www.w[employer]ters[employer]ripp[employer]uwer.com/en/know/clinical-effectiveness-terms. 2024© UpToDate, Inc. and its affiliates and/or licensors. All rights reserved.  Copyright   © 2024 UpToDate, Inc. and/or its affiliates. All rights reserved.

## 2024-07-21 PROBLEM — H00.015 HORDEOLUM EXTERNUM OF LEFT LOWER EYELID: Status: ACTIVE | Noted: 2024-07-21

## 2024-07-21 PROBLEM — M19.90 OSTEOARTHRITIS: Status: ACTIVE | Noted: 2024-07-21

## 2024-07-22 NOTE — ASSESSMENT & PLAN NOTE
Amoxicillin 1 tab twice a day and   Ilotycin apply small amount of ointment 3 times a day for 3-5 days.

## 2024-09-05 DIAGNOSIS — N13.8 BPH WITH URINARY OBSTRUCTION: ICD-10-CM

## 2024-09-05 DIAGNOSIS — N40.1 BPH WITH URINARY OBSTRUCTION: ICD-10-CM

## 2024-09-06 RX ORDER — TERAZOSIN 5 MG/1
5 CAPSULE ORAL DAILY
Qty: 90 CAPSULE | Refills: 1 | Status: SHIPPED | OUTPATIENT
Start: 2024-09-06

## 2024-10-07 NOTE — TELEPHONE ENCOUNTER
----- Message from Yariel Bello DO sent at 1/3/2022 12:26 PM EST -----  Labs are stable   Cont current Rx
Call Pt and left a message on VM with the results
[phone]

## 2024-11-26 ENCOUNTER — TELEPHONE (OUTPATIENT)
Age: 79
End: 2024-11-26

## 2024-11-26 ENCOUNTER — PREP FOR PROCEDURE (OUTPATIENT)
Age: 79
End: 2024-11-26

## 2024-11-26 DIAGNOSIS — Z12.11 SCREENING FOR COLON CANCER: Primary | ICD-10-CM

## 2024-11-26 NOTE — LETTER
Pau Martinez,    Attached are your prep instructions for your upcoming procedure on 12/09/24. If you have any questions, please give us a call at [phone].    Thank you,    [facility]         Medicine Instructions for Adults with Diabetes who Need a Bowel Prep       Follow these instructions when a BOWEL PREP is required for your procedure or surgery!    NOTE:   GLP-1 Agonists taken weekly: do not take in the 7 days before your procedure   SGLT-2 Inhibitors: do not take in the 4 days before your procedure     On the Day Before Surgery/Procedure  If you are having a procedure (e.g. Colonoscopy) or surgery that requires a bowel prep and you may have at least a clear liquid diet, follow the directions below based on the type of medicine you take for your diabetes.     Type of Medicine You Take Examples What to do   Pre-Mixed Insulin - Intermediate Acting Humalog® 75/25, Humulin® 70/30, Novolog® 70/30, Regular Insulin Take ½ your regular dose the evening before your procedure   Rapid/Fast Acting Insulin Humalog® U200, NovoLog®, Apidra®, Fiasp® Take ½ your regular dose the evening before your procedure.   Long-Acting Insulin Lantus®, Levemir®, Tresiba®, Toujeo®, Basaglar® Take your FULL regular dose the day before procedure   Oral Sulfonylurea Glipizide/Glimepiride/Glucotrol® Take ½ your regular dose the evening before your procedure   Other Oral Diabetes Medicines Metformin®, Glucophage®, Glucophage XR®, Riomet®, Glumetza®), Actose®, Avandia®, Glyset®, Prandin® Take your regular dose with dinner in the evening before your procedure   GLP-1 Agonists AdlyxinÒ, ByettaÒ, BydureonÒ, OzempicÒ, SoliquaÒ, TanzeumÒ, TrulicityÒ, VictozaÒ, Saxenda®, Rybelsus® If taken daily, take as normal    If taken weekly, do not take this medicine for 7 days before your procedure including the day of the procedure (resume taking after the procedure)   SGLT-2 Inhibitors Jardiance®, Invokana®,  Farxiga®,   Steglatro®, Brenzavvy®, Qtern®, Segluromet®, Glyxambi®, Synjardy®, Synjardy XR®, Invokamet®, Invokamet XR®, Trijary XR®, Xigduo XR®, Steglujan® Do not take for 4 days before your procedure including the day of the procedure (resume taking after the procedure)                More information continued on back                    Medicine Instructions for Adults with Diabetes who Need a Bowel Prep  Page 2      On the Day of Surgery/Procedure  Follow the directions below based on the type of medicine you take for your diabetes.     Type of Medicine You Take Examples What to do   Long-Acting Insulin Lantus®, Levemir®, Tresiba®, Toujeo®, Basaglar®, Semglee®   If you usually take your Long-Acting Insulin in the morning, take the full dose as scheduled.   GLP-1 Agonists AdlyxinÒ, ByettaÒ, BydureonÒ, OzempicÒ, SoliquaÒ, TanzeumÒ, TrulicityÒ, VictozaÒ, Saxenda®, Rybelsus® Do NOT take this medicine on the day of your procedure (resume taking after the procedure)       On the Day of Surgery/Procedure (continued)  Except for the morning Long-Acting Insulin, DO NOT take ANY diabetic medicine on the day of your procedure unless you were instructed by the doctor who manages your diabetes medicines.    Continue to check your blood sugars.  If you have an insulin pump, ask your endocrinologist for instructions at least 3 days before your procedure. NOTE: If you are not able to ask your endocrinologist in advance, on the day of the procedure set your insulin pump to your basal rate only. Bring your insulin pump supplies to the hospital.     If you have any questions about taking your diabetes medicines prior to your procedure, please contact the doctor who manages your diabetes medicines.

## 2024-11-26 NOTE — TELEPHONE ENCOUNTER
Scheduled date of colonoscopy (as of today): 12/09/24  Physician performing colonoscopy: Barbara  Location of colonoscopy: [facility]  Bowel prep reviewed with patient: DIONI/MCKENNA sent to My Chart  Instructions reviewed with patient by: SF  Clearances: N/A    : Layne Marcelo [phone]

## 2024-12-03 ENCOUNTER — OFFICE VISIT (OUTPATIENT)
Dept: UROLOGY | Facility: AMBULATORY SURGERY CENTER | Age: 79
End: 2024-12-03
Payer: MEDICARE

## 2024-12-03 VITALS
WEIGHT: 183 LBS | HEIGHT: 74 IN | SYSTOLIC BLOOD PRESSURE: 138 MMHG | HEART RATE: 50 BPM | OXYGEN SATURATION: 97 % | DIASTOLIC BLOOD PRESSURE: 72 MMHG | BODY MASS INDEX: 23.49 KG/M2

## 2024-12-03 DIAGNOSIS — R97.20 ELEVATED PSA: ICD-10-CM

## 2024-12-03 DIAGNOSIS — R33.9 URINARY RETENTION: Primary | ICD-10-CM

## 2024-12-03 LAB — POST-VOID RESIDUAL VOLUME, ML POC: 25 ML

## 2024-12-03 PROCEDURE — 99213 OFFICE O/P EST LOW 20 MIN: CPT

## 2024-12-03 PROCEDURE — 51798 US URINE CAPACITY MEASURE: CPT

## 2024-12-03 NOTE — PROGRESS NOTES
Office Visit- Urology  Juan Marcelo 1945 MRN: [id number]      Assessment/Discussion/Plan    79 y.o. male managed by     BPH  -Status post robot-assisted prostatectomy in May 2023  -no Significant urinary symptoms    2.  Urethral stricture  -Complication of robot-assisted prostatectomy.  Status post Optilume balloon dilation to September 2023  -Status post cystoscopy in January 2024 by Dr. Infante with 16-17 [place] bulbar stricture  -PVR today 25 ml demonstrating near complete bladder emptying   -continue with observation      3.  Prostate cancer screening  -Before prostatectomy PSA has ranged between 6-7  -Pathology report from prostatectomy demonstrated prostate tissue with small atypical focus highly suspicious for low-grade adenocarcinoma   -Post prostatectomy PSA returned at 1.5 in April 2024  -Due for updated PSA in April 2025 will call patient with results        Chief Complaint:   Juan is a 79 y.o. male presenting to the office for a follow up visit regarding BPH        Subjective    Patient is a 79-year-old male with a history of BPH associated with large bladder stone burden elevated PSA status post robot-assisted prostatectomy in May 2023 complicated by hematuria and urethral stricture status post Optilume balloon dilation in September 2023.  He was last in the office with Dr. Infante for cystoscopy in January 2024.  He states that he has been urinating without significant difficulty since that point in time.  No dysuria or gross hematuria.  No consistent splitting of the urinary stream.  No significant straining with urination or sensation of incomplete bladder emptying        ROS:   Review of Systems   Constitutional: Negative.  Negative for chills, fatigue and fever.   HENT: Negative.     Respiratory:  Negative for shortness of breath.    Cardiovascular:  Negative for chest pain.   Gastrointestinal: Negative.  Negative for abdominal pain.   Endocrine: Negative.    Musculoskeletal: Negative.    Skin:  Negative.    Neurological: Negative.  Negative for dizziness and light-headedness.   Hematological: Negative.    Psychiatric/Behavioral: Negative.           Past Medical History  Past Medical History:   Diagnosis Date    Anxiety     Bladder stones     BPH (benign prostatic hyperplasia)     Coronary artery disease     Diverticulosis     Hyperlipidemia     Hypertension     Myocardial infarction (HCC)     2006    Pink eye        Past Surgical History  Past Surgical History:   Procedure Laterality Date    CORONARY ANGIOPLASTY WITH STENT PLACEMENT      CYSTOSCOPY  01/23/2014    Diagnostic    JOINT REPLACEMENT Right     KNEE ARTHROSCOPY      NH CYSTOURETHROSCOPY W/INTERNAL URETHROTOMY N/A 9/5/2023    Procedure: Urethral stricture balloon dilation under fluoroscopic guidance;  Surgeon: Jai Infante MD;  Location[facility];  Service: Urology    PROSTATE BIOPSY  01/23/2014    PROSTATECTOMY N/A 05/22/2023    Procedure: ROBOTIC ASSISTED SUB-TOTAL SUPRAPUBIC PROSTATECTOMY;  Surgeon: Jai Infante MD;  Location: [facility];  Service: Urology    REPLACEMENT TOTAL KNEE      REPLACEMENT TOTAL KNEE         Past Family History  Family History   Problem Relation Age of Onset    Other Father         Cardiac Disorder       Past Social history  Social History     Socioeconomic History    Marital status: /Civil Union     Spouse name: Not on file    Number of children: Not on file    Years of education: Not on file    Highest education level: Not on file   Occupational History    Not on file   Tobacco Use    Smoking status: Never    Smokeless tobacco: Never   Vaping Use    Vaping status: Never Used   Substance and Sexual Activity    Alcohol use: Yes     Alcohol/week: 1.0 standard drink of alcohol     Types: 1 Standard drinks or equivalent per week     Comment: social    Drug use: No    Sexual activity: Not Currently     Partners: Female   Other Topics Concern    Not on file   Social History Narrative    Not on file  "    Social Drivers of Health     Financial Resource Strain: Low Risk  (1/18/2024)    Overall Financial Resource Strain (CARDIA)     Difficulty of Paying Living Expenses: Not hard at all   Food Insecurity: Not on file   Transportation Needs: No Transportation Needs (1/18/2024)    PRAPARE - Transportation     Lack of Transportation (Medical): No     Lack of Transportation (Non-Medical): No   Physical Activity: Not on file   Stress: Not on file   Social Connections: Not on file   Intimate Partner Violence: Not on file   Housing Stability: Not on file       Current Medications  Current Outpatient Medications   Medication Sig Dispense Refill    ALPRAZolam (XANAX) 0.25 mg tablet Take 1 tablet (0.25 mg total) by mouth if needed for anxiety 10 tablet 0    aspirin 81 MG tablet Take by mouth every other day      irbesartan (AVAPRO) 75 mg tablet Take 75 mg by mouth daily      simvastatin (ZOCOR) 20 mg tablet TAKE 1 TABLET BY MOUTH DAILY AT BEDTIME 100 tablet 1    terazosin (HYTRIN) 5 mg capsule TAKE 1 CAPSULE BY MOUTH EVERY DAY 90 capsule 1    erythromycin (ILOTYCIN) ophthalmic ointment Administer 0.5 inches into the left eye every 8 (eight) hours (Patient not taking: Reported on 12/3/2024) 3.5 g 0     No current facility-administered medications for this visit.       Allergies  Allergies   Allergen Reactions    Pollen Extract Allergic Rhinitis and Eye Swelling       OBJECTIVE    Vitals   Vitals:    12/03/24 1132   BP: 138/72   BP Location: Left arm   Patient Position: Sitting   Cuff Size: Standard   Pulse: (!) 50   SpO2: 97%   Weight: 83 kg (183 lb)   Height: 6' 2\" (1.88 m)       PVR:    Physical Exam  Constitutional:       General: He is not in acute distress.     Appearance: Normal appearance. He is normal weight. He is not ill-appearing or toxic-appearing.   HENT:      Head: Normocephalic and atraumatic.   Eyes:      Conjunctiva/sclera: Conjunctivae normal.   Cardiovascular:      Rate and Rhythm: Normal rate.   Pulmonary: "      Effort: Pulmonary effort is normal. No respiratory distress.   Skin:     General: Skin is warm and dry.   Neurological:      General: No focal deficit present.      Mental Status: He is alert and oriented to person, place, and time.      Cranial Nerves: No cranial nerve deficit.   Psychiatric:         Mood and Affect: Mood normal.         Behavior: Behavior normal.         Thought Content: Thought content normal.          Labs:     Lab Results   Component Value Date    PSA 1.5 04/24/2024    PSA 7.4 (H) 01/18/2023    PSA 6.1 (H) 07/09/2022    PSA 7.5 (H) 01/03/2022     Lab Results   Component Value Date    CREATININE 0.80 04/24/2024      Lab Results   Component Value Date    HGBA1C 5.5 04/25/2018     Lab Results   Component Value Date    GLUCOSE 89 04/10/2015    CALCIUM 9.1 04/24/2024     04/10/2015    K 4.0 04/24/2024    CO2 30 04/24/2024     04/24/2024    BUN 18 04/24/2024    CREATININE 0.80 04/24/2024       I have personally reviewed all pertinent lab results and reviewed with patient    Imaging       Santiago Mcgovern PA-C  Date: 12/3/2024 Time: 11:36 AM  [facility]    This note was written using fluency dictation software. Please excuse any resulting minor grammatical errors.

## 2024-12-09 ENCOUNTER — ANESTHESIA EVENT (OUTPATIENT)
Dept: GASTROENTEROLOGY | Facility: HOSPITAL | Age: 79
End: 2024-12-09
Payer: MEDICARE

## 2024-12-09 ENCOUNTER — ANESTHESIA (OUTPATIENT)
Dept: GASTROENTEROLOGY | Facility: HOSPITAL | Age: 79
End: 2024-12-09
Payer: MEDICARE

## 2024-12-09 ENCOUNTER — HOSPITAL ENCOUNTER (OUTPATIENT)
Dept: GASTROENTEROLOGY | Facility: HOSPITAL | Age: 79
Setting detail: OUTPATIENT SURGERY
Discharge: HOME | End: 2024-12-09
Attending: INTERNAL MEDICINE
Payer: MEDICARE

## 2024-12-09 VITALS
HEIGHT: 74 IN | DIASTOLIC BLOOD PRESSURE: 84 MMHG | OXYGEN SATURATION: 97 % | TEMPERATURE: 98 F | RESPIRATION RATE: 18 BRPM | BODY MASS INDEX: 23.1 KG/M2 | WEIGHT: 180 LBS | SYSTOLIC BLOOD PRESSURE: 131 MMHG | HEART RATE: 50 BPM

## 2024-12-09 DIAGNOSIS — Z12.11 SCREENING FOR COLON CANCER: ICD-10-CM

## 2024-12-09 PROCEDURE — G0121 COLON CA SCRN NOT HI RSK IND: HCPCS | Performed by: INTERNAL MEDICINE

## 2024-12-09 RX ORDER — PROPOFOL 10 MG/ML
INJECTION, EMULSION INTRAVENOUS AS NEEDED
Status: DISCONTINUED | OUTPATIENT
Start: 2024-12-09 | End: 2024-12-09

## 2024-12-09 RX ORDER — SODIUM CHLORIDE 9 MG/ML
100 INJECTION, SOLUTION INTRAVENOUS CONTINUOUS
Status: DISCONTINUED | OUTPATIENT
Start: 2024-12-09 | End: 2024-12-13 | Stop reason: HOSPADM

## 2024-12-09 RX ORDER — SODIUM CHLORIDE 9 MG/ML
INJECTION, SOLUTION INTRAVENOUS CONTINUOUS PRN
Status: DISCONTINUED | OUTPATIENT
Start: 2024-12-09 | End: 2024-12-09

## 2024-12-09 RX ADMIN — PROPOFOL 20 MG: 10 INJECTION, EMULSION INTRAVENOUS at 08:19

## 2024-12-09 RX ADMIN — PROPOFOL 50 MG: 10 INJECTION, EMULSION INTRAVENOUS at 08:13

## 2024-12-09 RX ADMIN — SODIUM CHLORIDE: 0.9 INJECTION, SOLUTION INTRAVENOUS at 07:58

## 2024-12-09 RX ADMIN — PROPOFOL 50 MG: 10 INJECTION, EMULSION INTRAVENOUS at 08:16

## 2024-12-09 RX ADMIN — PROPOFOL 80 MG: 10 INJECTION, EMULSION INTRAVENOUS at 08:09

## 2024-12-09 NOTE — H&P
History and Physical - [facility]  Juan Marcelo 79 y.o. male MRN: [id number]    HPI: Juan Marcelo is a 79 y.o. year old male who presents for average for screening colonoscopy    REVIEW OF SYSTEMS: Per the HPI, and otherwise unremarkable.    Historical Information   Past Medical History:   Diagnosis Date    Anxiety     Bladder stones     BPH (benign prostatic hyperplasia)     Coronary artery disease     Diverticulosis     Hyperlipidemia     Hypertension     Myocardial infarction (HCC)     2006    Pink eye      Past Surgical History:   Procedure Laterality Date    CORONARY ANGIOPLASTY WITH STENT PLACEMENT      CYSTOSCOPY  01/23/2014    Diagnostic    HAND SURGERY Left     JOINT REPLACEMENT Right     KNEE ARTHROSCOPY      DE CYSTOURETHROSCOPY W/INTERNAL URETHROTOMY N/A 09/05/2023    Procedure: Urethral stricture balloon dilation under fluoroscopic guidance;  Surgeon: Jai Infante MD;  Location: [facility] MAIN OR;  Service: Urology    PROSTATE BIOPSY  01/23/2014    PROSTATECTOMY N/A 05/22/2023    Procedure: ROBOTIC ASSISTED SUB-TOTAL SUPRAPUBIC PROSTATECTOMY;  Surgeon: Jai Infante MD;  Location: [facility] Main [facility];  Service: Urology    REPLACEMENT TOTAL KNEE Right     REPLACEMENT TOTAL KNEE       Social History   Social History     Substance and Sexual Activity   Alcohol Use Yes    Alcohol/week: 1.0 standard drink of alcohol    Types: 1 Standard drinks or equivalent per week    Comment: social     Social History     Substance and Sexual Activity   Drug Use No     Social History     Tobacco Use   Smoking Status Never   Smokeless Tobacco Never     Family History   Problem Relation Age of Onset    Other Father         Cardiac Disorder       Meds/Allergies       Current Outpatient Medications:     ALPRAZolam (XANAX) 0.25 mg tablet    irbesartan (AVAPRO) 75 mg tablet    terazosin (HYTRIN) 5 mg capsule    aspirin 81 MG tablet    erythromycin (ILOTYCIN) ophthalmic ointment    simvastatin (ZOCOR) 20 mg  "tablet    Current Facility-Administered Medications:     sodium chloride 0.9 % infusion, 100 mL/hr, Intravenous, Continuous    Facility-Administered Medications Ordered in Other Encounters:     sodium chloride 0.9 % infusion, , Intravenous, Continuous PRN, New Bag at 12/09/24 0758    Allergies   Allergen Reactions    Pollen Extract Allergic Rhinitis and Eye Swelling       Objective     BP (!) 179/81   Pulse 57   Temp 98 °F (36.7 °C) (Temporal)   Resp 16   Ht 6' 2\" (1.88 m)   Wt 81.6 kg (180 lb)   SpO2 99%   BMI 23.11 kg/m²     PHYSICAL EXAM    Gen: NAD AAOx3  Head: Normocephalic, Atraumatic  CV: S1S2 RRR no m/r/g  CHEST: Clear b/l no c/r/w  ABD: soft, +BS NT/ND  EXT: no edema    ASSESSMENT/PLAN:  This is a 79 y.o. year old male here for average risk screening colonoscopy, and he is stable and optimized for his procedure.        "

## 2024-12-09 NOTE — ANESTHESIA PREPROCEDURE EVALUATION
Procedure:  COLONOSCOPY    Relevant Problems   ANESTHESIA (within normal limits)      CARDIO  MI 2006 -> 2 stents  Neg stress test 2015, normal EF on echo 2018   (+) CAD in native artery   (+) Essential (primary) hypertension   (+) Hyperlipidemia   (+) Myocardial infarction (HCC) (Resolved) (2006)      GI/HEPATIC   (+) Esophageal reflux      /RENAL   (+) Benign prostatic hyperplasia with elevated prostate specific antigen (PSA)      NEURO/PSYCH   (+) Anxiety      PULMONARY   (-) Sleep apnea   (-) Smoking   (-) URI (upper respiratory infection)      Physical Exam    Airway    Mallampati score: I  TM Distance: >3 FB  Neck ROM: full     Dental   No notable dental hx     Cardiovascular      Pulmonary      Other Findings      Lab Results   Component Value Date    WBC 6.28 04/24/2024    HGB 13.6 04/24/2024     04/24/2024     Lab Results   Component Value Date    SODIUM 136 04/24/2024    K 4.0 04/24/2024    BUN 18 04/24/2024    CREATININE 0.80 04/24/2024    EGFR 86 04/24/2024    GLUCOSE 89 04/10/2015     Lab Results   Component Value Date    HGBA1C 5.5 04/25/2018         Anesthesia Plan  ASA Score- 2     Anesthesia Type- IV sedation with anesthesia with ASA Monitors.         Additional Monitors:     Airway Plan:            Plan Factors-Exercise tolerance (METS): >4 METS.    Chart reviewed.   Existing labs reviewed. Patient summary reviewed.    Patient is not a current smoker.              Induction- intravenous.    Postoperative Plan-         Informed Consent- Anesthetic plan and risks discussed with patient.  I personally reviewed this patient with the CRNA. Discussed and agreed on the Anesthesia Plan with the CRNA..

## 2024-12-09 NOTE — ANESTHESIA POSTPROCEDURE EVALUATION
Post-Op Assessment Note    CV Status:  Stable  Pain Score: 0    Pain management: adequate       Mental Status:  Alert and awake   Hydration Status:  Euvolemic   PONV Controlled:  Controlled   Airway Patency:  Patent     Post Op Vitals Reviewed: Yes    No anethesia notable event occurred.    Staff: Anesthesiologist, CRNA           Last Filed PACU Vitals:  Vitals Value Taken Time   Temp     Pulse     BP     Resp     SpO2 97%        Modified Ashley:  No data recorded

## 2025-01-28 ENCOUNTER — OFFICE VISIT (OUTPATIENT)
Dept: FAMILY MEDICINE CLINIC | Facility: CLINIC | Age: 80
End: 2025-01-28
Payer: MEDICARE

## 2025-01-28 VITALS
TEMPERATURE: 96.8 F | HEART RATE: 64 BPM | HEIGHT: 74 IN | SYSTOLIC BLOOD PRESSURE: 130 MMHG | DIASTOLIC BLOOD PRESSURE: 70 MMHG | WEIGHT: 187 LBS | OXYGEN SATURATION: 99 % | BODY MASS INDEX: 24 KG/M2

## 2025-01-28 DIAGNOSIS — F41.9 ANXIETY: ICD-10-CM

## 2025-01-28 DIAGNOSIS — E78.2 MIXED HYPERLIPIDEMIA: ICD-10-CM

## 2025-01-28 DIAGNOSIS — I10 PRIMARY HYPERTENSION: ICD-10-CM

## 2025-01-28 DIAGNOSIS — R00.2 PALPITATIONS: ICD-10-CM

## 2025-01-28 DIAGNOSIS — Z00.00 MEDICARE ANNUAL WELLNESS VISIT, SUBSEQUENT: Primary | ICD-10-CM

## 2025-01-28 DIAGNOSIS — D69.6 THROMBOCYTOPENIA (HCC): ICD-10-CM

## 2025-01-28 PROCEDURE — G2211 COMPLEX E/M VISIT ADD ON: HCPCS | Performed by: NURSE PRACTITIONER

## 2025-01-28 PROCEDURE — G0439 PPPS, SUBSEQ VISIT: HCPCS | Performed by: NURSE PRACTITIONER

## 2025-01-28 PROCEDURE — 99213 OFFICE O/P EST LOW 20 MIN: CPT | Performed by: NURSE PRACTITIONER

## 2025-01-28 RX ORDER — ESCITALOPRAM OXALATE 5 MG/1
5 TABLET ORAL DAILY
Qty: 30 TABLET | Refills: 5 | Status: SHIPPED | OUTPATIENT
Start: 2025-01-28

## 2025-01-28 RX ORDER — ALPRAZOLAM 0.25 MG/1
0.25 TABLET ORAL AS NEEDED
Qty: 10 TABLET | Refills: 0 | Status: SHIPPED | OUTPATIENT
Start: 2025-01-28

## 2025-01-28 NOTE — ASSESSMENT & PLAN NOTE
Suspect anxiety is impacting his home BP readings  Appears to be living in heightened state of anxiety  Start lexapro 1/2 tab daily for 1 week, then increase to 1 tab daily  Orders:    ALPRAZolam (XANAX) 0.25 mg tablet; Take 1 tablet (0.25 mg total) by mouth if needed for anxiety    escitalopram (LEXAPRO) 5 mg tablet; Take 1 tablet (5 mg total) by mouth daily Start with a half tab once daily for 1 week, then icnrease to 1 tab daily

## 2025-01-28 NOTE — PROGRESS NOTES
Name: Juan Marcelo      : 1945      MRN: [id number]  Encounter Provider: MIKE Zendejas  Encounter Date: 2025   Encounter department: [facility]    Assessment & Plan  Mixed hyperlipidemia  Check labs  Orders:    Lipid panel; Future    Comprehensive metabolic panel; Future    Primary hypertension  BP stable in office  Continue current medication regimen  Okay to keep cardiology appointment  Do not check BP so many times during the day  Orders:    TSH, 3rd generation with Free T4 reflex; Future    Comprehensive metabolic panel; Future    Medicare annual wellness visit, subsequent         Anxiety  Suspect anxiety is impacting his home BP readings  Appears to be living in heightened state of anxiety  Start lexapro 1/2 tab daily for 1 week, then increase to 1 tab daily  Orders:    ALPRAZolam (XANAX) 0.25 mg tablet; Take 1 tablet (0.25 mg total) by mouth if needed for anxiety    escitalopram (LEXAPRO) 5 mg tablet; Take 1 tablet (5 mg total) by mouth daily Start with a half tab once daily for 1 week, then icnrease to 1 tab daily    Thrombocytopenia (HCC)  Check labs  Orders:    CBC and differential; Future    Palpitations  Has cardiology appt this week  Orders:    TSH, 3rd generation with Free T4 reflex; Future    Comprehensive metabolic panel; Future       Preventive health issues were discussed with patient, and age appropriate screening tests were ordered as noted in patient's After Visit Summary. Personalized health advice and appropriate referrals for health education or preventive services given if needed, as noted in patient's After Visit Summary.    History of Present Illness       Here today for AWV  He feels he cannot control his blood pressure  Increases as the day goes on.  He is taking irbesartan he does a half tab in AM and 1/2 tab later in the day  Terazosin at bedtime  His blood pressure this morning was 150/85  Then after medication 125/70.     Over the weekend  "he had palpitations,  He feels his heart beating  No dizziness, he feels his heart is skipping beats at time.   Has appointment on 1/31/25 with Dr Rehman     He is checking his blood pressure more than 5 times daily  He feels anxious  He states he is \"high strung\" things can get him going easily  He was given a script last year for xanax 1/18/24 #10  He will use for flying but it does help him calm down for the airport experience    Had labs drawn 4/2024: CBC, CMP, TSH, LIPID   PSA 1.5    He exercises regularly- without chest pain, shortness of breath or palpitations                   Patient Care Team:  [place] Gomes DO as PCP - General (Family Medicine)  DO Jacob Bennett MD    Review of Systems   Constitutional: Negative.  Negative for activity change, appetite change, fatigue and fever.   HENT: Negative.     Respiratory:  Negative for cough, chest tightness, shortness of breath and wheezing.    Cardiovascular:  Positive for palpitations. Negative for chest pain and leg swelling.   Neurological:  Negative for dizziness, seizures and headaches.   Psychiatric/Behavioral:  Positive for agitation and sleep disturbance. The patient is nervous/anxious.      Medical History Reviewed by provider this encounter:       Annual Wellness Visit Questionnaire   Juan is here for his Subsequent Wellness visit.     Health Risk Assessment:   Patient rates overall health as good. Patient feels that their physical health rating is slightly worse. Patient is satisfied with their life. Eyesight was rated as slightly better. Hearing was rated as slightly worse. Patient feels that their emotional and mental health rating is slightly worse. Patients states they are never, rarely angry. Patient states they are never, rarely unusually tired/fatigued. Pain experienced in the last 7 days has been none. Patient states that he has experienced no weight loss or gain in last 6 months.     Depression Screening: "   PHQ-2 Score: 0      Fall Risk Screening:   In the past year, patient has experienced: no history of falling in past year      Home Safety:  Patient does not have trouble with stairs inside or outside of their home. Patient has working smoke alarms and has working carbon monoxide detector. Home safety hazards include: loose rugs on the floor.     Nutrition:   Current diet is Low Carb and Regular.     Medications:   Patient is not currently taking any over-the-counter supplements. Patient is able to manage medications.     Activities of Daily Living (ADLs)/Instrumental Activities of Daily Living (IADLs):   Walk and transfer into and out of bed and chair?: Yes  Dress and groom yourself?: Yes    Bathe or shower yourself?: Yes    Feed yourself? Yes  Do your laundry/housekeeping?: Yes  Manage your money, pay your bills and track your expenses?: Yes  Make your own meals?: Yes    Do your own shopping?: Yes    Previous Hospitalizations:   Any hospitalizations or ED visits within the last 12 months?: No      PREVENTIVE SCREENINGS      Cardiovascular Screening:    General: Screening Not Indicated and History Lipid Disorder    Due for: Lipid Panel      Diabetes Screening:     General: Screening Current    Due for: Blood Glucose      Colorectal Cancer Screening:     General: Screening Current      Prostate Cancer Screening:    General: Screening Not Indicated      Lung Cancer Screening:     General: Screening Not Indicated      Hepatitis C Screening:    General: Screening Current    Screening, Brief Intervention, and Referral to Treatment (SBIRT)    Screening  Typical number of drinks in a day: 0  Typical number of drinks in a week: 0  Interpretation: Low risk drinking behavior.    Single Item Drug Screening:  How often have you used an illegal drug (including marijuana) or a prescription medication for non-medical reasons in the past year? never    Single Item Drug Screen Score: 0  Interpretation: Negative screen for possible  "drug use disorder    Social Drivers of Health     Financial Resource Strain: Low Risk  (1/18/2024)    Overall Financial Resource Strain (CARDIA)     Difficulty of Paying Living Expenses: Not hard at all   Transportation Needs: No Transportation Needs (1/18/2024)    PRAPARE - Transportation     Lack of Transportation (Medical): No     Lack of Transportation (Non-Medical): No     No results found.    Objective   /70 (BP Location: Right arm, Patient Position: Sitting, Cuff Size: Adult)   Pulse 64   Temp (!) 96.8 °F (36 °C) (Tympanic)   Ht 6' 2\" (1.88 m)   Wt 84.8 kg (187 lb)   SpO2 99%   BMI 24.01 kg/m²     Physical Exam  Constitutional:       Appearance: He is well-developed.   HENT:      Head: Normocephalic.      Right Ear: External ear normal.      Left Ear: External ear normal.      Nose: Nose normal.   Eyes:      Pupils: Pupils are equal, round, and reactive to light.   Cardiovascular:      Rate and Rhythm: Normal rate and regular rhythm.      Heart sounds: Normal heart sounds.   Pulmonary:      Effort: Pulmonary effort is normal.      Breath sounds: Normal breath sounds.   Abdominal:      General: Bowel sounds are normal.      Palpations: Abdomen is soft.      Tenderness: There is no abdominal tenderness.   Genitourinary:     Prostate: Normal.   Musculoskeletal:         General: Normal range of motion.      Cervical back: Normal range of motion and neck supple.   Skin:     General: Skin is warm and dry.   Neurological:      Mental Status: He is alert and oriented to person, place, and time.      Deep Tendon Reflexes: Reflexes normal.   Psychiatric:         Behavior: Behavior normal.         Thought Content: Thought content normal.         Judgment: Judgment normal.         "

## 2025-01-29 DIAGNOSIS — E78.2 MIXED HYPERLIPIDEMIA: ICD-10-CM

## 2025-01-30 RX ORDER — SIMVASTATIN 20 MG
20 TABLET ORAL
Qty: 90 TABLET | Refills: 1 | Status: SHIPPED | OUTPATIENT
Start: 2025-01-30

## 2025-01-30 NOTE — PATIENT INSTRUCTIONS
Medicare Preventive Visit Patient Instructions  Thank you for completing your Welcome to Medicare Visit or Medicare Annual Wellness Visit today. Your next wellness visit will be due in one year (1/30/2026).  The screening/preventive services that you may require over the next 5-10 years are detailed below. Some tests may not apply to you based off risk factors and/or age. Screening tests ordered at today's visit but not completed yet may show as past due. Also, please note that scanned in results may not display below.  Preventive Screenings:  Service Recommendations Previous Testing/Comments   Colorectal Cancer Screening  Colonoscopy    Fecal Occult Blood Test (FOBT)/Fecal Immunochemical Test (FIT)  Fecal DNA/Cologuard Test  Flexible Sigmoidoscopy Age: 45-75 years old   Colonoscopy: every 10 years (May be performed more frequently if at higher risk)  OR  FOBT/FIT: every 1 year  OR  Cologuard: every 3 years  OR  Sigmoidoscopy: every 5 years  Screening may be recommended earlier than age 45 if at higher risk for colorectal cancer. Also, an individualized decision between you and your healthcare provider will decide whether screening between the ages of 76-85 would be appropriate. Colonoscopy: 12/09/2024  FOBT/FIT: Not on file  Cologuard: Not on file  Sigmoidoscopy: Not on file          Prostate Cancer Screening Individualized decision between patient and health care provider in men between ages of 55-69   Medicare will cover every 12 months beginning on the day after your 50th birthday PSA: 1.5 ng/mL     Screening Not Indicated     Hepatitis C Screening Once for adults born between 1945 and 1965  More frequently in patients at high risk for Hepatitis C Hep C Antibody: 12/07/2018    Screening Current   Diabetes Screening 1-2 times per year if you're at risk for diabetes or have pre-diabetes Fasting glucose: 82 mg/dL (4/24/2024)  A1C: No results in last 5 years (No results in last 5 years)  Screening Current    Cholesterol Screening Once every 5 years if you don't have a lipid disorder. May order more often based on risk factors. Lipid panel: 04/24/2024  Screening Not Indicated  History Lipid Disorder      Other Preventive Screenings Covered by Medicare:  Abdominal Aortic Aneurysm (AAA) Screening: covered once if your at risk. You're considered to be at risk if you have a family history of AAA or a male between the age of 65-75 who smoking at least 100 cigarettes in your lifetime.  Lung Cancer Screening: covers low dose CT scan once per year if you meet all of the following conditions: (1) Age 55-77; (2) No signs or symptoms of lung cancer; (3) Current smoker or have quit smoking within the last 15 years; (4) You have a tobacco smoking history of at least 20 pack years (packs per day x number of years you smoked); (5) You get a written order from a healthcare provider.  Glaucoma Screening: covered annually if you're considered high risk: (1) You have diabetes OR (2) Family history of glaucoma OR (3)  aged 50 and older OR (4)  American aged 65 and older  Osteoporosis Screening: covered every 2 years if you meet one of the following conditions: (1) Have a vertebral abnormality; (2) On glucocorticoid therapy for more than 3 months; (3) Have primary hyperparathyroidism; (4) On osteoporosis medications and need to assess response to drug therapy.  HIV Screening: covered annually if you're between the age of 15-65. Also covered annually if you are younger than 15 and older than 65 with risk factors for HIV infection. For pregnant patients, it is covered up to 3 times per pregnancy.    Immunizations:  Immunization Recommendations   Influenza Vaccine Annual influenza vaccination during flu season is recommended for all persons aged >= 6 months who do not have contraindications   Pneumococcal Vaccine   * Pneumococcal conjugate vaccine = PCV13 (Prevnar 13), PCV15 (Vaxneuvance), PCV20 (Prevnar 20)  *  Pneumococcal polysaccharide vaccine = PPSV23 (Pneumovax) Adults 19-63 yo with certain risk factors or if 65+ yo  If never received any pneumonia vaccine: recommend Prevnar 20 (PCV20)  Give PCV20 if previously received 1 dose of PCV13 or PPSV23   Hepatitis B Vaccine 3 dose series if at intermediate or high risk (ex: diabetes, end stage renal disease, liver disease)   Respiratory syncytial virus (RSV) Vaccine - COVERED BY MEDICARE PART D  * RSVPreF3 (Arexvy) CDC recommends that adults 60 years of age and older may receive a single dose of RSV vaccine using shared clinical decision-making (SCDM)   Tetanus (Td) Vaccine - COST NOT COVERED BY MEDICARE PART B Following completion of primary series, a booster dose should be given every 10 years to maintain immunity against tetanus. Td may also be given as tetanus wound prophylaxis.   Tdap Vaccine - COST NOT COVERED BY MEDICARE PART B Recommended at least once for all adults. For pregnant patients, recommended with each pregnancy.   Shingles Vaccine (Shingrix) - COST NOT COVERED BY MEDICARE PART B  2 shot series recommended in those 19 years and older who have or will have weakened immune systems or those 50 years and older     Health Maintenance Due:      Topic Date Due   • Hepatitis C Screening  Completed   • Colorectal Cancer Screening  Discontinued     Immunizations Due:      Topic Date Due   • Pneumococcal Vaccine: 65+ Years (1 of 1 - PCV) Never done   • Influenza Vaccine (1) 09/01/2024     Advance Directives   What are advance directives?  Advance directives are legal documents that state your wishes and plans for medical care. These plans are made ahead of time in case you lose your ability to make decisions for yourself. Advance directives can apply to any medical decision, such as the treatments you want, and if you want to donate organs.   What are the types of advance directives?  There are many types of advance directives, and each state has rules about how to  use them. You may choose a combination of any of the following:  Living will:  This is a written record of the treatment you want. You can also choose which treatments you do not want, which to limit, and which to stop at a certain time. This includes surgery, medicine, IV fluid, and tube feedings.   Durable power of  for healthcare (DPAHC):  This is a written record that states who you want to make healthcare choices for you when you are unable to make them for yourself. This person, called a proxy, is usually a family member or a friend. You may choose more than 1 proxy.  Do not resuscitate (DNR) order:  A DNR order is used in case your heart stops beating or you stop breathing. It is a request not to have certain forms of treatment, such as CPR. A DNR order may be included in other types of advance directives.  Medical directive:  This covers the care that you want if you are in a coma, near death, or unable to make decisions for yourself. You can list the treatments you want for each condition. Treatment may include pain medicine, surgery, blood transfusions, dialysis, IV or tube feedings, and a ventilator (breathing machine).  Values history:  This document has questions about your views, beliefs, and how you feel and think about life. This information can help others choose the care that you would choose.  Why are advance directives important?  An advance directive helps you control your care. Although spoken wishes may be used, it is better to have your wishes written down. Spoken wishes can be misunderstood, or not followed. Treatments may be given even if you do not want them. An advance directive may make it easier for your family to make difficult choices about your care.       © Copyright [employer] 2018 Information is for End User's use only and may not be sold, redistributed or otherwise used for commercial purposes. All illustrations and images included in CareNotes® are the copyrighted  property of [employer] or [employer]

## 2025-01-31 ENCOUNTER — OFFICE VISIT (OUTPATIENT)
Dept: CARDIOLOGY CLINIC | Facility: CLINIC | Age: 80
End: 2025-01-31
Payer: MEDICARE

## 2025-01-31 VITALS
DIASTOLIC BLOOD PRESSURE: 72 MMHG | WEIGHT: 185 LBS | HEART RATE: 54 BPM | HEIGHT: 74 IN | SYSTOLIC BLOOD PRESSURE: 144 MMHG | BODY MASS INDEX: 23.74 KG/M2

## 2025-01-31 DIAGNOSIS — R00.2 PALPITATIONS: Primary | ICD-10-CM

## 2025-01-31 DIAGNOSIS — I25.10 CAD IN NATIVE ARTERY: Chronic | ICD-10-CM

## 2025-01-31 DIAGNOSIS — I10 ESSENTIAL (PRIMARY) HYPERTENSION: ICD-10-CM

## 2025-01-31 DIAGNOSIS — E78.2 MIXED HYPERLIPIDEMIA: ICD-10-CM

## 2025-01-31 PROCEDURE — G2211 COMPLEX E/M VISIT ADD ON: HCPCS | Performed by: INTERNAL MEDICINE

## 2025-01-31 PROCEDURE — 93000 ELECTROCARDIOGRAM COMPLETE: CPT | Performed by: INTERNAL MEDICINE

## 2025-01-31 PROCEDURE — 99204 OFFICE O/P NEW MOD 45 MIN: CPT | Performed by: INTERNAL MEDICINE

## 2025-01-31 RX ORDER — IRBESARTAN 75 MG/1
75 TABLET ORAL DAILY
Qty: 90 TABLET | Refills: 3 | Status: SHIPPED | OUTPATIENT
Start: 2025-01-31 | End: 2025-01-31

## 2025-01-31 RX ORDER — IRBESARTAN 75 MG/1
75 TABLET ORAL 2 TIMES DAILY
Qty: 180 TABLET | Refills: 3 | Status: SHIPPED | OUTPATIENT
Start: 2025-01-31

## 2025-01-31 NOTE — PROGRESS NOTES
"[facility] Cardiology  Office Consultation  Juan Marcelo 80 y.o. male MRN: [id number]        Chief Complaint    Chief Complaint   Patient presents with    New Patient Visit     Patient experiencing palpitations and fluctuating BP. [place] [facility] prior to us.     Currently,        Referring Provider: Self, Referral    Impression & Plan:    1. Palpitations (Primary)  Suspect benign ectopics  No alarm symptoms  Check Holter  - POCT ECG  - Holter monitor; Future    2. CAD in native artery  Continue aspirin 81 mg daily, simvastatin 20 mg daily    3. Essential (primary) hypertension  Stop terazosin  Increase irbesartan to 75 mg BID -- patient prefers to split dosing to BID  - irbesartan (AVAPRO) 75 mg tablet; Take 1 tablet (75 mg total) by mouth 2 (two) times a day  Dispense: 180 tablet; Refill: 3    4. Mixed hyperlipidemia  Continue simvastatin. Can discuss alternative statins at subsequent visits.         We will see Juan Marcelo back in 6 months for routine follow-up.    HPI: Juan Marcelo is a 80 y.o. year old male with CAD status post PCI, hypertension, hyperlipidemia presenting to establish care.    Previously followed by cardiology at [facility].  Today reviewed 27 pages of records from [place].  Noted to have history of PCI to LAD and second diagonal in 2006.  His last echocardiogram in 2018 showed EF 65 to 70% with mild concentric LVH.    No recurrent angina since then.     He has been having palpitations for the last week on and off. The sensation is that of a skipping heart beat every several beats. Had not occurred prior to this week. He has also had a couple episodes of presyncope he describes as feeling that \"my bottom just fell out\". It happened once recently while driving and he had contemplated pulling over.     He walks and swims regularly. When weather is nice he rides his bicycle. No chest pain or chest pressure. No cardiopulmonary limitations.     Cardiac testing:           EKG " reviewed personally:   [facility]--sinus bradycardia, 49 bpm, left anterior fascicular block, abnormal study.    1/31/25 - sinus bradycardia, 54 bpm, left axis deviation, abnormal study    Relevant Laboratory Studies:  (Laboratory studies personally reviewed)  Lipid panel 4/24/2024--, TG 81, HDL 48, LDL 62 mg/dL    Review of Systems      Past Medical History:   Diagnosis Date    Anxiety     Bladder stones     BPH (benign prostatic hyperplasia)     Coronary artery disease     Diverticulosis     Hyperlipidemia     Hypertension     Myocardial infarction (HCC)     2006    Pink eye      Past Surgical History:   Procedure Laterality Date    CORONARY ANGIOPLASTY WITH STENT PLACEMENT      CYSTOSCOPY  01/23/2014    Diagnostic    HAND SURGERY Left     JOINT REPLACEMENT Right     KNEE ARTHROSCOPY      HI CYSTOURETHROSCOPY W/INTERNAL URETHROTOMY N/A 09/05/2023    Procedure: Urethral stricture balloon dilation under fluoroscopic guidance;  Surgeon: Jai Infante MD;  Location: [facility] MAIN OR;  Service: Urology    PROSTATE BIOPSY  01/23/2014    PROSTATECTOMY N/A 05/22/2023    Procedure: ROBOTIC ASSISTED SUB-TOTAL SUPRAPUBIC PROSTATECTOMY;  Surgeon: Jai Infante MD;  Location: [facility] Main OR;  Service: Urology    REPLACEMENT TOTAL KNEE Right     REPLACEMENT TOTAL KNEE       Social History     Substance and Sexual Activity   Alcohol Use Yes    Alcohol/week: 1.0 standard drink of alcohol    Types: 1 Standard drinks or equivalent per week    Comment: social     Social History     Substance and Sexual Activity   Drug Use No     Social History     Tobacco Use   Smoking Status Never   Smokeless Tobacco Never     Family History   Problem Relation Age of Onset    Stroke Sister         2 sisiters passed away this yr       Allergies:  Allergies   Allergen Reactions    Pollen Extract Allergic Rhinitis and Eye Swelling       Medications (as of START of this encounter):   Outpatient  "Medications Prior to Visit   Medication Sig Dispense Refill    ALPRAZolam (XANAX) 0.25 mg tablet Take 1 tablet (0.25 mg total) by mouth if needed for anxiety 10 tablet 0    aspirin 81 MG tablet Take by mouth every other day      irbesartan (AVAPRO) 75 mg tablet Take 75 mg by mouth daily      simvastatin (ZOCOR) 20 mg tablet TAKE 1 TABLET BY MOUTH EVERYDAY AT BEDTIME 90 tablet 1    terazosin (HYTRIN) 5 mg capsule TAKE 1 CAPSULE BY MOUTH EVERY DAY 90 capsule 1    escitalopram (LEXAPRO) 5 mg tablet Take 1 tablet (5 mg total) by mouth daily Start with a half tab once daily for 1 week, then icnrease to 1 tab daily (Patient not taking: Reported on 1/31/2025) 30 tablet 5     No facility-administered medications prior to visit.         Vitals:    01/31/25 1019   BP: 144/72   Pulse: (!) 54     Weight (last 2 days)       Date/Time Weight    01/31/25 1019 83.9 (185)            General: Juan Marcelo is a well appearing male, in no acute distress, sitting comfortably  HEENT: moist mucous membranes, EOMI  Neck:  No JVD, supple, trachea midline   Cardiovascular: unremarkable S1/S2, regular rate and rhythm, no murmurs, rubs or gallops   Pulmonary: normal respiratory effort, CTAB   Abdomen: soft and nondistended  Extremities: No lower extremity edema. Warm and well perfused extremities.   Neuro: no focal motor deficits, AAOx3 (person, place, time)  Psych: Normal mood and affect, cooperative          Luis M Rehman MD    This note was completed in part utilizing [employer] recognition software. Grammatical errors, random word insertion, spelling mistakes, occasional wrong word or \"sound-alike\" substitutions and incomplete sentences may be an occasional consequence of the system secondary to software limitations, ambient noise and hardware issues. At the time of dictation, efforts were made to edit, clarify and /or correct errors.  Please read the chart carefully and recognize, using context, where substitutions have " occurred.  If you have any questions or concerns about the context, text or information contained within the body of this dictation, please contact myself, the provider, for further clarification.

## 2025-01-31 NOTE — PATIENT INSTRUCTIONS
Stop terazosin  Change irbesartan to 75 mg tablet -- one full tab twice daily      Schedule Holter

## 2025-02-03 ENCOUNTER — HOSPITAL ENCOUNTER (OUTPATIENT)
Dept: NON INVASIVE DIAGNOSTICS | Age: 80
Discharge: HOME | End: 2025-02-03
Payer: MEDICARE

## 2025-02-03 DIAGNOSIS — R00.2 PALPITATIONS: ICD-10-CM

## 2025-02-03 PROCEDURE — 93225 XTRNL ECG REC<48 HRS REC: CPT

## 2025-02-03 PROCEDURE — 93226 XTRNL ECG REC<48 HR SCAN A/R: CPT

## 2025-02-05 ENCOUNTER — TELEPHONE (OUTPATIENT)
Dept: CARDIOLOGY CLINIC | Facility: CLINIC | Age: 80
End: 2025-02-05

## 2025-02-05 DIAGNOSIS — I10 ESSENTIAL (PRIMARY) HYPERTENSION: ICD-10-CM

## 2025-02-10 RX ORDER — IRBESARTAN 150 MG/1
150 TABLET ORAL DAILY
Qty: 90 TABLET | Refills: 3 | Status: SHIPPED | OUTPATIENT
Start: 2025-02-10

## 2025-02-11 NOTE — TELEPHONE ENCOUNTER
Fax from [employer] requesting a quantity limit change for pt's Irbesartan 75 mg BID. They will only cover 30 pills in 30 days. One option is 150 mg daily or a coverage review if you feel that he needs to remain on the current dosage. Please advise. Thank you.  
Relayed   
I have personally performed a face to face diagnostic evaluation on this patient. I have reviewed the ACP note and agree with the history, exam and plan of care, except as noted.

## 2025-02-12 PROCEDURE — 93227 XTRNL ECG REC<48 HR R&I: CPT | Performed by: INTERNAL MEDICINE

## 2025-02-13 ENCOUNTER — RESULTS FOLLOW-UP (OUTPATIENT)
Dept: CARDIOLOGY CLINIC | Facility: CLINIC | Age: 80
End: 2025-02-13

## 2025-02-23 DIAGNOSIS — F41.9 ANXIETY: ICD-10-CM

## 2025-02-24 RX ORDER — ESCITALOPRAM OXALATE 5 MG/1
TABLET ORAL
Qty: 90 TABLET | Refills: 0 | Status: SHIPPED | OUTPATIENT
Start: 2025-02-24

## 2025-03-20 DIAGNOSIS — F41.9 ANXIETY: ICD-10-CM

## 2025-03-20 RX ORDER — ALPRAZOLAM 0.25 MG
0.25 TABLET ORAL AS NEEDED
Qty: 20 TABLET | Refills: 0 | Status: SHIPPED | OUTPATIENT
Start: 2025-03-20

## 2025-03-20 NOTE — TELEPHONE ENCOUNTER
Reason for call:   [x] Refill   [] Prior Auth  [x] Other: Pt is going vacation next week    Office:   [x] PCP/Provider - HEAVENLY HERNANDEZ  Authorized By: MIKE Zendejas  [] Specialty/Provider -     Medication:     ALPRAZolam (XANAX) 0.25 mg tablet         [id number] [address] [place] [address] [phone]     Local Pharmacy   Does the patient have enough for 3 days?   [] Yes   [x] No - Send as HP to POD    Mail Away Pharmacy   Does the patient have enough for 10 days?   [] Yes   [] No - Send as HP to POD

## 2025-05-07 ENCOUNTER — APPOINTMENT (OUTPATIENT)
Dept: LAB | Facility: CLINIC | Age: 80
End: 2025-05-07
Payer: MEDICARE

## 2025-05-07 DIAGNOSIS — R00.2 PALPITATIONS: ICD-10-CM

## 2025-05-07 DIAGNOSIS — I10 PRIMARY HYPERTENSION: ICD-10-CM

## 2025-05-07 DIAGNOSIS — R97.20 ELEVATED PSA: ICD-10-CM

## 2025-05-07 DIAGNOSIS — E78.2 MIXED HYPERLIPIDEMIA: ICD-10-CM

## 2025-05-07 DIAGNOSIS — D69.6 THROMBOCYTOPENIA (HCC): ICD-10-CM

## 2025-05-07 LAB
ALBUMIN SERPL BCG-MCNC: 4.2 G/DL (ref 3.5–5)
ALP SERPL-CCNC: 46 U/L (ref 34–104)
ALT SERPL W P-5'-P-CCNC: 12 U/L (ref 7–52)
ANION GAP SERPL CALCULATED.3IONS-SCNC: 9 MMOL/L (ref 4–13)
AST SERPL W P-5'-P-CCNC: 18 U/L (ref 13–39)
BASOPHILS # BLD AUTO: 0.04 THOUSANDS/ÂΜL (ref 0–0.1)
BASOPHILS NFR BLD AUTO: 1 % (ref 0–1)
BILIRUB SERPL-MCNC: 0.72 MG/DL (ref 0.2–1)
BUN SERPL-MCNC: 16 MG/DL (ref 5–25)
CALCIUM SERPL-MCNC: 9.1 MG/DL (ref 8.4–10.2)
CHLORIDE SERPL-SCNC: 103 MMOL/L (ref 96–108)
CHOLEST SERPL-MCNC: 135 MG/DL (ref ?–200)
CO2 SERPL-SCNC: 28 MMOL/L (ref 21–32)
CREAT SERPL-MCNC: 0.79 MG/DL (ref 0.6–1.3)
EOSINOPHIL # BLD AUTO: 0.14 THOUSAND/ÂΜL (ref 0–0.61)
EOSINOPHIL NFR BLD AUTO: 3 % (ref 0–6)
ERYTHROCYTE [DISTWIDTH] IN BLOOD BY AUTOMATED COUNT: 13 % (ref 11.6–15.1)
GFR SERPL CREATININE-BSD FRML MDRD: 86 ML/MIN/1.73SQ M
GLUCOSE P FAST SERPL-MCNC: 80 MG/DL (ref 65–99)
HCT VFR BLD AUTO: 42.8 % (ref 36.5–49.3)
HDLC SERPL-MCNC: 50 MG/DL
HGB BLD-MCNC: 13.9 G/DL (ref 12–17)
IMM GRANULOCYTES # BLD AUTO: 0.02 THOUSAND/UL (ref 0–0.2)
IMM GRANULOCYTES NFR BLD AUTO: 0 % (ref 0–2)
LDLC SERPL CALC-MCNC: 67 MG/DL (ref 0–100)
LYMPHOCYTES # BLD AUTO: 1.69 THOUSANDS/ÂΜL (ref 0.6–4.47)
LYMPHOCYTES NFR BLD AUTO: 35 % (ref 14–44)
MCH RBC QN AUTO: 30 PG (ref 26.8–34.3)
MCHC RBC AUTO-ENTMCNC: 32.5 G/DL (ref 31.4–37.4)
MCV RBC AUTO: 92 FL (ref 82–98)
MONOCYTES # BLD AUTO: 0.42 THOUSAND/ÂΜL (ref 0.17–1.22)
MONOCYTES NFR BLD AUTO: 9 % (ref 4–12)
NEUTROPHILS # BLD AUTO: 2.48 THOUSANDS/ÂΜL (ref 1.85–7.62)
NEUTS SEG NFR BLD AUTO: 52 % (ref 43–75)
NONHDLC SERPL-MCNC: 85 MG/DL
NRBC BLD AUTO-RTO: 0 /100 WBCS
PLATELET # BLD AUTO: 133 THOUSANDS/UL (ref 149–390)
PMV BLD AUTO: 11.7 FL (ref 8.9–12.7)
POTASSIUM SERPL-SCNC: 4.1 MMOL/L (ref 3.5–5.3)
PROT SERPL-MCNC: 6.9 G/DL (ref 6.4–8.4)
PSA SERPL-MCNC: 1.8 NG/ML (ref 0–4)
RBC # BLD AUTO: 4.63 MILLION/UL (ref 3.88–5.62)
SODIUM SERPL-SCNC: 140 MMOL/L (ref 135–147)
TRIGL SERPL-MCNC: 92 MG/DL (ref ?–150)
TSH SERPL DL<=0.05 MIU/L-ACNC: 1.96 UIU/ML (ref 0.45–4.5)
WBC # BLD AUTO: 4.79 THOUSAND/UL (ref 4.31–10.16)

## 2025-05-07 PROCEDURE — 36415 COLL VENOUS BLD VENIPUNCTURE: CPT

## 2025-05-07 PROCEDURE — 84443 ASSAY THYROID STIM HORMONE: CPT

## 2025-05-07 PROCEDURE — 85025 COMPLETE CBC W/AUTO DIFF WBC: CPT

## 2025-05-07 PROCEDURE — 80061 LIPID PANEL: CPT

## 2025-05-07 PROCEDURE — 80053 COMPREHEN METABOLIC PANEL: CPT

## 2025-05-07 PROCEDURE — 84153 ASSAY OF PSA TOTAL: CPT

## 2025-05-08 ENCOUNTER — RESULTS FOLLOW-UP (OUTPATIENT)
Dept: UROLOGY | Facility: AMBULATORY SURGERY CENTER | Age: 80
End: 2025-05-08

## 2025-05-08 ENCOUNTER — RESULTS FOLLOW-UP (OUTPATIENT)
Dept: FAMILY MEDICINE CLINIC | Facility: CLINIC | Age: 80
End: 2025-05-08

## 2025-05-08 NOTE — TELEPHONE ENCOUNTER
----- Message from MIKE Hernandez sent at 5/8/2025  7:42 AM EDT -----  PSA continues to remain low and stable, follow up in December.

## 2025-05-08 NOTE — TELEPHONE ENCOUNTER
Tried calling patient to let him know that PSA continues to remain low and stable, follow up in December. If patient calls back please relay message back.

## 2025-05-08 NOTE — RESULT ENCOUNTER NOTE
Nahum Martinez all of your labs are stable, you have mildly low platelets similar to years past. Will continue to monitor yearly, monitor for easy bleeding. Continue current medication regimen.

## 2025-05-28 DIAGNOSIS — F41.9 ANXIETY: ICD-10-CM

## 2025-05-29 RX ORDER — ESCITALOPRAM OXALATE 5 MG/1
TABLET ORAL
Qty: 90 TABLET | Refills: 0 | Status: SHIPPED | OUTPATIENT
Start: 2025-05-29

## 2025-07-21 DIAGNOSIS — E78.2 MIXED HYPERLIPIDEMIA: ICD-10-CM

## 2025-07-22 RX ORDER — SIMVASTATIN 20 MG
20 TABLET ORAL
Qty: 90 TABLET | Refills: 1 | Status: SHIPPED | OUTPATIENT
Start: 2025-07-22

## 2025-08-21 ENCOUNTER — OFFICE VISIT (OUTPATIENT)
Dept: CARDIOLOGY CLINIC | Facility: CLINIC | Age: 80
End: 2025-08-21
Payer: MEDICARE

## 2025-08-21 VITALS
SYSTOLIC BLOOD PRESSURE: 132 MMHG | HEART RATE: 62 BPM | OXYGEN SATURATION: 98 % | BODY MASS INDEX: 24.01 KG/M2 | DIASTOLIC BLOOD PRESSURE: 70 MMHG | WEIGHT: 187 LBS

## 2025-08-21 DIAGNOSIS — I10 ESSENTIAL (PRIMARY) HYPERTENSION: Primary | ICD-10-CM

## 2025-08-21 DIAGNOSIS — R97.20 BENIGN PROSTATIC HYPERPLASIA WITH ELEVATED PROSTATE SPECIFIC ANTIGEN (PSA): ICD-10-CM

## 2025-08-21 DIAGNOSIS — I25.10 CAD IN NATIVE ARTERY: Chronic | ICD-10-CM

## 2025-08-21 DIAGNOSIS — N40.0 BENIGN PROSTATIC HYPERPLASIA WITH ELEVATED PROSTATE SPECIFIC ANTIGEN (PSA): ICD-10-CM

## 2025-08-21 PROCEDURE — G2211 COMPLEX E/M VISIT ADD ON: HCPCS | Performed by: INTERNAL MEDICINE

## 2025-08-21 PROCEDURE — 99214 OFFICE O/P EST MOD 30 MIN: CPT | Performed by: INTERNAL MEDICINE

## 2025-08-21 RX ORDER — TERAZOSIN 5 MG/1
5 CAPSULE ORAL
Qty: 90 CAPSULE | Refills: 3 | Status: SHIPPED | OUTPATIENT
Start: 2025-08-21

## 2025-08-21 RX ORDER — IRBESARTAN 150 MG/1
150 TABLET ORAL DAILY
Qty: 90 TABLET | Refills: 3 | Status: SHIPPED | OUTPATIENT
Start: 2025-08-21

## (undated) DEVICE — 40595 XL TRENDELENBURG POSITIONING KIT: Brand: 40595 XL TRENDELENBURG POSITIONING KIT

## (undated) DEVICE — CYSTO TUBING TUR Y IRRIGATION

## (undated) DEVICE — BLADE COLD STRAIGHT STRL

## (undated) DEVICE — TENACULUM FORCEPS: Brand: ENDOWRIST

## (undated) DEVICE — Device

## (undated) DEVICE — PACK TUR

## (undated) DEVICE — LARGE NEEDLE DRIVER: Brand: ENDOWRIST

## (undated) DEVICE — SPECIMEN CONTAINER STERILE PEEL PACK

## (undated) DEVICE — PROGRASP FORCEPS: Brand: ENDOWRIST

## (undated) DEVICE — CATH URETERAL 5FR X 70 CM FLEX TIP POLYUR BARD

## (undated) DEVICE — CANNULA SEAL

## (undated) DEVICE — ENDOPATH PNEUMONEEDLE INSUFFLATION NEEDLES WITH LUER LOCK CONNECTORS 120MM: Brand: ENDOPATH

## (undated) DEVICE — SURGIFLO ENDOSCOPIC APPICATOR: Brand: ETHICON

## (undated) DEVICE — DRAPE SHEET THREE QUARTER

## (undated) DEVICE — BAG URINE DRAINAGE 2000ML ANTI RFLX LF

## (undated) DEVICE — TISSUE RETRIEVAL SYSTEM: Brand: INZII RETRIEVAL SYSTEM

## (undated) DEVICE — BLADELESS OBTURATOR: Brand: WECK VISTA

## (undated) DEVICE — GLOVE INDICATOR PI UNDERGLOVE SZ 8 BLUE

## (undated) DEVICE — CATH SECURE FOLEY

## (undated) DEVICE — URETHRAL DRUG COATED BALLOON CATHETER

## (undated) DEVICE — COLUMN DRAPE

## (undated) DEVICE — SURGICEL 4 X 8

## (undated) DEVICE — HEMOSTATIC MATRIX SURGIFLO 8ML W/THROMBIN

## (undated) DEVICE — GLOVE SRG BIOGEL 7.5

## (undated) DEVICE — AIRSEAL TUBE SMOKE EVAC LUMENX3 FILTERED

## (undated) DEVICE — SUT PDS II 0 CT-1 27 IN Z340H

## (undated) DEVICE — KIT, BETHLEHEM ROBOTIC PROST: Brand: CARDINAL HEALTH

## (undated) DEVICE — SYRINGE 10ML LL

## (undated) DEVICE — ADHESIVE SKIN HIGH VISCOSITY EXOFIN 1ML

## (undated) DEVICE — TROCAR PORT ACCESS 12 X120MM W/BLDLS OPTICAL TIP AIRSEAL

## (undated) DEVICE — SUT STRATAFIX SPIRAL MONOCRYL PLUS 3-0 RB-1 23CM SXMP1B438

## (undated) DEVICE — PREMIUM DRY TRAY LF: Brand: MEDLINE INDUSTRIES, INC.

## (undated) DEVICE — CATH FOLEY 12FR 5ML 2 WAY UNCOATED SILICONE

## (undated) DEVICE — SUT VICRYL 0 CT-1 27 IN J260H

## (undated) DEVICE — SUT VICRYL 0 UR-6 27 IN J603H

## (undated) DEVICE — INTENDED FOR TISSUE SEPARATION, AND OTHER PROCEDURES THAT REQUIRE A SHARP SURGICAL BLADE TO PUNCTURE OR CUT.: Brand: BARD-PARKER SAFETY BLADES SIZE 11, STERILE

## (undated) DEVICE — STERILE SURGICAL LUBRICANT,  TUBE: Brand: SURGILUBE

## (undated) DEVICE — UROLOGIC DRAIN BAG: Brand: UNBRANDED

## (undated) DEVICE — SUT STRATAFIX SMTR PDS PLUS 1 CT-1 30CM SXPP1A435

## (undated) DEVICE — SUT MONOCRYL 4-0 PS-2 27 IN Y426H

## (undated) DEVICE — DRAPE SHEET X-LG

## (undated) DEVICE — BAG URINE DRAINAGE 4000ML CONTINUOUS IRR

## (undated) DEVICE — ARM DRAPE

## (undated) DEVICE — DECANTER: Brand: UNBRANDED

## (undated) DEVICE — SUT ETHILON 3-0 FS-1 18 IN 663G

## (undated) DEVICE — VISUALIZATION SYSTEM: Brand: CLEARIFY

## (undated) DEVICE — ASTOUND STANDARD SURGICAL GOWN, XL: Brand: CONVERTORS

## (undated) DEVICE — MONOPOLAR CURVED SCISSORS: Brand: ENDOWRIST

## (undated) DEVICE — CLOSURE DEVICE ENDO CLOSE

## (undated) DEVICE — HEAVY DUTY TABLE COVER: Brand: CONVERTORS

## (undated) DEVICE — TROCAR: Brand: KII FIOS FIRST ENTRY

## (undated) DEVICE — GUIDEWIRE STRGHT TIP 0.035 IN  SOLO PLUS

## (undated) DEVICE — HEM-O-LOK CLIP CARTRIDGE LARGE DA VINCI SI/XI

## (undated) DEVICE — CHLORAPREP HI-LITE 26ML ORANGE

## (undated) DEVICE — CATH FOLEY COUNCIL 20FR 5ML 2 WAY LUBRICATH